# Patient Record
Sex: MALE | Race: WHITE | Employment: OTHER | URBAN - METROPOLITAN AREA
[De-identification: names, ages, dates, MRNs, and addresses within clinical notes are randomized per-mention and may not be internally consistent; named-entity substitution may affect disease eponyms.]

---

## 2017-03-30 RX ORDER — OMEPRAZOLE 40 MG/1
40 CAPSULE, DELAYED RELEASE ORAL DAILY
Qty: 90 CAPSULE | Refills: 3 | Status: SHIPPED | OUTPATIENT
Start: 2017-03-30 | End: 2017-10-24

## 2017-04-10 ENCOUNTER — OFFICE VISIT (OUTPATIENT)
Dept: INTERNAL MEDICINE CLINIC | Age: 82
End: 2017-04-10

## 2017-04-10 VITALS
SYSTOLIC BLOOD PRESSURE: 138 MMHG | RESPIRATION RATE: 17 BRPM | BODY MASS INDEX: 23.46 KG/M2 | HEART RATE: 60 BPM | WEIGHT: 177 LBS | DIASTOLIC BLOOD PRESSURE: 80 MMHG | HEIGHT: 73 IN

## 2017-04-10 DIAGNOSIS — L20.84 INTRINSIC ECZEMA: ICD-10-CM

## 2017-04-10 DIAGNOSIS — G30.1 LATE ONSET ALZHEIMER'S DISEASE WITHOUT BEHAVIORAL DISTURBANCE (HCC): Primary | ICD-10-CM

## 2017-04-10 DIAGNOSIS — F02.80 LATE ONSET ALZHEIMER'S DISEASE WITHOUT BEHAVIORAL DISTURBANCE (HCC): Primary | ICD-10-CM

## 2017-04-10 DIAGNOSIS — K20.90 ESOPHAGITIS: ICD-10-CM

## 2017-04-10 PROCEDURE — 4040F PNEUMOC VAC/ADMIN/RCVD: CPT | Performed by: INTERNAL MEDICINE

## 2017-04-10 PROCEDURE — 1123F ACP DISCUSS/DSCN MKR DOCD: CPT | Performed by: INTERNAL MEDICINE

## 2017-04-10 PROCEDURE — 1036F TOBACCO NON-USER: CPT | Performed by: INTERNAL MEDICINE

## 2017-04-10 PROCEDURE — G8427 DOCREV CUR MEDS BY ELIG CLIN: HCPCS | Performed by: INTERNAL MEDICINE

## 2017-04-10 PROCEDURE — G8420 CALC BMI NORM PARAMETERS: HCPCS | Performed by: INTERNAL MEDICINE

## 2017-04-10 PROCEDURE — 99215 OFFICE O/P EST HI 40 MIN: CPT | Performed by: INTERNAL MEDICINE

## 2017-04-10 RX ORDER — TAMSULOSIN HYDROCHLORIDE 0.4 MG/1
0.8 CAPSULE ORAL DAILY
Qty: 60 CAPSULE | Refills: 11 | Status: SHIPPED | OUTPATIENT
Start: 2017-04-10 | End: 2018-06-15 | Stop reason: SDUPTHER

## 2017-04-10 RX ORDER — TRIAMCINOLONE ACETONIDE 0.25 MG/G
CREAM TOPICAL
Qty: 30 G | Refills: 5 | Status: SHIPPED | OUTPATIENT
Start: 2017-04-10 | End: 2018-07-02

## 2017-08-16 ENCOUNTER — TELEPHONE (OUTPATIENT)
Dept: INTERNAL MEDICINE CLINIC | Age: 82
End: 2017-08-16

## 2017-08-16 ENCOUNTER — OFFICE VISIT (OUTPATIENT)
Dept: INTERNAL MEDICINE CLINIC | Age: 82
End: 2017-08-16

## 2017-08-16 VITALS
BODY MASS INDEX: 22.96 KG/M2 | RESPIRATION RATE: 17 BRPM | SYSTOLIC BLOOD PRESSURE: 139 MMHG | TEMPERATURE: 98.7 F | HEART RATE: 77 BPM | WEIGHT: 174 LBS | DIASTOLIC BLOOD PRESSURE: 76 MMHG | OXYGEN SATURATION: 98 %

## 2017-08-16 DIAGNOSIS — R21 RASH: ICD-10-CM

## 2017-08-16 DIAGNOSIS — G30.1 LATE ONSET ALZHEIMER'S DISEASE WITHOUT BEHAVIORAL DISTURBANCE (HCC): ICD-10-CM

## 2017-08-16 DIAGNOSIS — N30.01 ACUTE CYSTITIS WITH HEMATURIA: ICD-10-CM

## 2017-08-16 DIAGNOSIS — K20.90 ESOPHAGITIS: ICD-10-CM

## 2017-08-16 DIAGNOSIS — N40.0 BENIGN PROSTATIC HYPERPLASIA WITHOUT LOWER URINARY TRACT SYMPTOMS, UNSPECIFIED MORPHOLOGY: ICD-10-CM

## 2017-08-16 DIAGNOSIS — R30.0 DYSURIA: ICD-10-CM

## 2017-08-16 DIAGNOSIS — F02.80 LATE ONSET ALZHEIMER'S DISEASE WITHOUT BEHAVIORAL DISTURBANCE (HCC): ICD-10-CM

## 2017-08-16 DIAGNOSIS — I10 ESSENTIAL HYPERTENSION: Primary | ICD-10-CM

## 2017-08-16 LAB
BILIRUBIN, POC: NEGATIVE
BLOOD URINE, POC: ABNORMAL
CLARITY, POC: ABNORMAL
COLOR, POC: ABNORMAL
GLUCOSE URINE, POC: NEGATIVE
KETONES, POC: NEGATIVE
LEUKOCYTE EST, POC: ABNORMAL
NITRITE, POC: NEGATIVE
PH, POC: 5.5
PROTEIN, POC: ABNORMAL
SPECIFIC GRAVITY, POC: 1.02
UROBILINOGEN, POC: 0.2

## 2017-08-16 PROCEDURE — 90670 PCV13 VACCINE IM: CPT | Performed by: INTERNAL MEDICINE

## 2017-08-16 PROCEDURE — G8427 DOCREV CUR MEDS BY ELIG CLIN: HCPCS | Performed by: INTERNAL MEDICINE

## 2017-08-16 PROCEDURE — 81002 URINALYSIS NONAUTO W/O SCOPE: CPT | Performed by: INTERNAL MEDICINE

## 2017-08-16 PROCEDURE — G8420 CALC BMI NORM PARAMETERS: HCPCS | Performed by: INTERNAL MEDICINE

## 2017-08-16 PROCEDURE — 1036F TOBACCO NON-USER: CPT | Performed by: INTERNAL MEDICINE

## 2017-08-16 PROCEDURE — G0009 ADMIN PNEUMOCOCCAL VACCINE: HCPCS | Performed by: INTERNAL MEDICINE

## 2017-08-16 PROCEDURE — 4040F PNEUMOC VAC/ADMIN/RCVD: CPT | Performed by: INTERNAL MEDICINE

## 2017-08-16 PROCEDURE — 1123F ACP DISCUSS/DSCN MKR DOCD: CPT | Performed by: INTERNAL MEDICINE

## 2017-08-16 PROCEDURE — 99215 OFFICE O/P EST HI 40 MIN: CPT | Performed by: INTERNAL MEDICINE

## 2017-08-16 RX ORDER — PERMETHRIN 50 MG/G
CREAM TOPICAL
Qty: 1 TUBE | Refills: 1 | Status: SHIPPED | OUTPATIENT
Start: 2017-08-16 | End: 2018-07-02

## 2017-08-16 RX ORDER — CIPROFLOXACIN 500 MG/1
500 TABLET, FILM COATED ORAL 2 TIMES DAILY
Qty: 10 TABLET | Refills: 0 | Status: SHIPPED | OUTPATIENT
Start: 2017-08-16 | End: 2017-08-21

## 2017-08-16 RX ORDER — LATANOPROST 50 UG/ML
SOLUTION/ DROPS OPHTHALMIC
Refills: 6 | COMMUNITY
Start: 2017-06-12

## 2017-08-16 ASSESSMENT — PATIENT HEALTH QUESTIONNAIRE - PHQ9
2. FEELING DOWN, DEPRESSED OR HOPELESS: 0
SUM OF ALL RESPONSES TO PHQ QUESTIONS 1-9: 0
SUM OF ALL RESPONSES TO PHQ9 QUESTIONS 1 & 2: 0
1. LITTLE INTEREST OR PLEASURE IN DOING THINGS: 0

## 2017-08-19 LAB
ORGANISM: ABNORMAL
URINE CULTURE, ROUTINE: ABNORMAL

## 2017-10-10 ENCOUNTER — TELEPHONE (OUTPATIENT)
Dept: FAMILY MEDICINE CLINIC | Age: 82
End: 2017-10-10

## 2017-10-11 NOTE — TELEPHONE ENCOUNTER
Caryn W/ Romy informed that we do not have a release of records of file for this year so she would have to come up to the office to fill out this release and to bring the documents stating she is Budd Shaheen grider then I would be able to provide her with Erick thao AVS

## 2017-10-24 ENCOUNTER — OFFICE VISIT (OUTPATIENT)
Dept: INTERNAL MEDICINE CLINIC | Age: 82
End: 2017-10-24

## 2017-10-24 VITALS
RESPIRATION RATE: 16 BRPM | DIASTOLIC BLOOD PRESSURE: 79 MMHG | HEART RATE: 65 BPM | SYSTOLIC BLOOD PRESSURE: 138 MMHG | BODY MASS INDEX: 22.53 KG/M2 | WEIGHT: 170 LBS | HEIGHT: 73 IN

## 2017-10-24 DIAGNOSIS — I10 ESSENTIAL HYPERTENSION: ICD-10-CM

## 2017-10-24 DIAGNOSIS — K21.00 GASTROESOPHAGEAL REFLUX DISEASE WITH ESOPHAGITIS: ICD-10-CM

## 2017-10-24 DIAGNOSIS — N39.0 URINARY TRACT INFECTION WITHOUT HEMATURIA, SITE UNSPECIFIED: Primary | ICD-10-CM

## 2017-10-24 LAB
BILIRUBIN, POC: NEGATIVE
BLOOD URINE, POC: NEGATIVE
CLARITY, POC: NORMAL
COLOR, POC: YELLOW
GLUCOSE URINE, POC: NEGATIVE
KETONES, POC: NEGATIVE
LEUKOCYTE EST, POC: NEGATIVE
NITRITE, POC: NEGATIVE
PH, POC: 6
PROTEIN, POC: NEGATIVE
SPECIFIC GRAVITY, POC: 1.02
UROBILINOGEN, POC: 0.2

## 2017-10-24 PROCEDURE — G8484 FLU IMMUNIZE NO ADMIN: HCPCS | Performed by: INTERNAL MEDICINE

## 2017-10-24 PROCEDURE — 99214 OFFICE O/P EST MOD 30 MIN: CPT | Performed by: INTERNAL MEDICINE

## 2017-10-24 PROCEDURE — G8420 CALC BMI NORM PARAMETERS: HCPCS | Performed by: INTERNAL MEDICINE

## 2017-10-24 PROCEDURE — 1036F TOBACCO NON-USER: CPT | Performed by: INTERNAL MEDICINE

## 2017-10-24 PROCEDURE — 81002 URINALYSIS NONAUTO W/O SCOPE: CPT | Performed by: INTERNAL MEDICINE

## 2017-10-24 PROCEDURE — 1123F ACP DISCUSS/DSCN MKR DOCD: CPT | Performed by: INTERNAL MEDICINE

## 2017-10-24 PROCEDURE — 4040F PNEUMOC VAC/ADMIN/RCVD: CPT | Performed by: INTERNAL MEDICINE

## 2017-10-24 PROCEDURE — G8427 DOCREV CUR MEDS BY ELIG CLIN: HCPCS | Performed by: INTERNAL MEDICINE

## 2017-10-24 RX ORDER — RANITIDINE 150 MG/1
150 TABLET ORAL 2 TIMES DAILY
Qty: 180 TABLET | Refills: 3 | Status: SHIPPED | OUTPATIENT
Start: 2017-10-24 | End: 2018-07-02

## 2017-11-14 RX ORDER — OMEPRAZOLE 40 MG/1
40 CAPSULE, DELAYED RELEASE ORAL DAILY
Qty: 90 CAPSULE | Refills: 3 | OUTPATIENT
Start: 2017-11-14

## 2017-11-14 NOTE — TELEPHONE ENCOUNTER
Daughter calling, stating that patient is complaining that the Zantac is not helping his heartburn and he would like to start taking the Omeprazole again.

## 2017-11-16 RX ORDER — OMEPRAZOLE 40 MG/1
40 CAPSULE, DELAYED RELEASE ORAL DAILY
Qty: 90 CAPSULE | Refills: 0 | Status: SHIPPED | OUTPATIENT
Start: 2017-11-16 | End: 2018-04-02 | Stop reason: SDUPTHER

## 2018-04-02 RX ORDER — OMEPRAZOLE 40 MG/1
40 CAPSULE, DELAYED RELEASE ORAL DAILY
Qty: 90 CAPSULE | Refills: 1 | Status: SHIPPED | OUTPATIENT
Start: 2018-04-02 | End: 2018-10-10 | Stop reason: SDUPTHER

## 2018-05-07 ENCOUNTER — TELEPHONE (OUTPATIENT)
Dept: INTERNAL MEDICINE CLINIC | Age: 83
End: 2018-05-07

## 2018-05-07 DIAGNOSIS — R32 URINARY INCONTINENCE, UNSPECIFIED TYPE: Primary | ICD-10-CM

## 2018-05-22 DIAGNOSIS — R32 URINARY INCONTINENCE, UNSPECIFIED TYPE: ICD-10-CM

## 2018-05-25 ENCOUNTER — TELEPHONE (OUTPATIENT)
Dept: INTERNAL MEDICINE CLINIC | Age: 83
End: 2018-05-25

## 2018-05-25 DIAGNOSIS — R32 URINARY INCONTINENCE, UNSPECIFIED TYPE: ICD-10-CM

## 2018-06-11 ENCOUNTER — TELEPHONE (OUTPATIENT)
Dept: INTERNAL MEDICINE CLINIC | Age: 83
End: 2018-06-11

## 2018-06-15 RX ORDER — TAMSULOSIN HYDROCHLORIDE 0.4 MG/1
0.8 CAPSULE ORAL DAILY
Qty: 60 CAPSULE | Refills: 0 | Status: SHIPPED | OUTPATIENT
Start: 2018-06-15 | End: 2018-07-13 | Stop reason: SDUPTHER

## 2018-07-02 ENCOUNTER — OFFICE VISIT (OUTPATIENT)
Dept: INTERNAL MEDICINE CLINIC | Age: 83
End: 2018-07-02

## 2018-07-02 VITALS
RESPIRATION RATE: 16 BRPM | HEIGHT: 73 IN | BODY MASS INDEX: 21.47 KG/M2 | SYSTOLIC BLOOD PRESSURE: 155 MMHG | WEIGHT: 162 LBS | OXYGEN SATURATION: 99 % | DIASTOLIC BLOOD PRESSURE: 84 MMHG | HEART RATE: 84 BPM

## 2018-07-02 DIAGNOSIS — K21.00 GASTROESOPHAGEAL REFLUX DISEASE WITH ESOPHAGITIS: ICD-10-CM

## 2018-07-02 DIAGNOSIS — R35.0 URINARY FREQUENCY: ICD-10-CM

## 2018-07-02 DIAGNOSIS — N40.0 BENIGN PROSTATIC HYPERPLASIA, UNSPECIFIED WHETHER LOWER URINARY TRACT SYMPTOMS PRESENT: ICD-10-CM

## 2018-07-02 DIAGNOSIS — R30.0 DYSURIA: ICD-10-CM

## 2018-07-02 DIAGNOSIS — I10 ESSENTIAL HYPERTENSION: Primary | ICD-10-CM

## 2018-07-02 DIAGNOSIS — F02.80 LATE ONSET ALZHEIMER'S DISEASE WITHOUT BEHAVIORAL DISTURBANCE (HCC): ICD-10-CM

## 2018-07-02 DIAGNOSIS — N30.01 ACUTE CYSTITIS WITH HEMATURIA: ICD-10-CM

## 2018-07-02 DIAGNOSIS — G30.1 LATE ONSET ALZHEIMER'S DISEASE WITHOUT BEHAVIORAL DISTURBANCE (HCC): ICD-10-CM

## 2018-07-02 LAB
BILIRUBIN, POC: ABNORMAL
BLOOD URINE, POC: ABNORMAL
CLARITY, POC: ABNORMAL
COLOR, POC: YELLOW
GLUCOSE URINE, POC: ABNORMAL
KETONES, POC: ABNORMAL
LEUKOCYTE EST, POC: ABNORMAL
NITRITE, POC: ABNORMAL
PH, POC: 5.5
PROTEIN, POC: 100
SPECIFIC GRAVITY, POC: 1.02
UROBILINOGEN, POC: 0.2

## 2018-07-02 PROCEDURE — 81002 URINALYSIS NONAUTO W/O SCOPE: CPT | Performed by: INTERNAL MEDICINE

## 2018-07-02 PROCEDURE — 99214 OFFICE O/P EST MOD 30 MIN: CPT | Performed by: INTERNAL MEDICINE

## 2018-07-02 RX ORDER — CIPROFLOXACIN 500 MG/1
500 TABLET, FILM COATED ORAL 2 TIMES DAILY
Qty: 20 TABLET | Refills: 0 | Status: SHIPPED | OUTPATIENT
Start: 2018-07-02 | End: 2018-07-12

## 2018-07-02 NOTE — PATIENT INSTRUCTIONS
Patient Education        Urinary Tract Infections in Men: Care Instructions  Your Care Instructions    A urinary tract infection, or UTI, is a general term for an infection anywhere between the kidneys and the tip of the penis. UTIs can also be a result of a prostate problem. Most cause pain or burning when you urinate. Most UTIs are caused by bacteria and can be cured with antibiotics. It is important to complete your treatment so that the infection does not get worse. Follow-up care is a key part of your treatment and safety. Be sure to make and go to all appointments, and call your doctor if you are having problems. It's also a good idea to know your test results and keep a list of the medicines you take. How can you care for yourself at home? · Take your antibiotics as prescribed. Do not stop taking them just because you feel better. You need to take the full course of antibiotics. · Take your medicines exactly as prescribed. Your doctor may have prescribed a medicine, such as phenazopyridine (Pyridium), to help relieve pain when you urinate. This turns your urine orange. You may stop taking it when your symptoms get better. But be sure to take all of your antibiotics, which treat the infection. · Drink extra water for the next day or two. This will help make the urine less concentrated and help wash out the bacteria causing the infection. (If you have kidney, heart, or liver disease and have to limit your fluids, talk with your doctor before you increase your fluid intake.)  · Avoid drinks that are carbonated or have caffeine. They can irritate the bladder. · Urinate often. Try to empty your bladder each time. · To relieve pain, take a hot bath or lay a heating pad (set on low) over your lower belly or genital area. Never go to sleep with a heating pad in place. To help prevent UTIs  · Drink plenty of fluids, enough so that your urine is light yellow or clear like water.  If you have kidney, heart, or more?  Go to [email or web address] and sign in to your StayT[employer] account. Enter T255 in the [facility] box to learn more about \"Urinary Tract Infections in Men: Care Instructions. \"     If you do not have an account, please click on the \"Sign Up Now\" link. Current as of: May 12, 2017  Content Version: 11.6  © 7463-5710 [employer]. Care instructions adapted under license by [facility] ([facility]). If you have questions about a medical condition or this instruction, always ask your healthcare professional. [address] any warranty or liability for your use of this information.

## 2018-07-02 NOTE — PROGRESS NOTES
Chief Complaint   Patient presents with    Hypertension       HPI: Here for management of multiple chronic conditions as per the active problems list below, which I reviewed and updated with the patient today. States doing well with no new concerns except if noted below. I have reviewed the chart notes available from myself and other providers. I have addressed all active problems listed below, and created or updated the problems list as needed. Patient Active Problem List   Diagnosis    Benign prostatic hyperplasia    Gastroesophageal reflux disease with esophagitis    Hypertension    Terminal esophageal web    Primary open angle glaucoma of both eyes, mild stage    S/P TURP (status post transurethral resection of prostate)    Intrinsic eczema    Late onset Alzheimer's disease without behavioral disturbance       No problem-specific Assessment & Plan notes found for this encounter.       Discussed all labs, other tests, and imaging if available   Lab Results   Component Value Date    WBC 5.5 04/27/2016    HGB 13.5 04/27/2016    HCT 40.6 04/27/2016    MCV 90.7 04/27/2016     04/27/2016    LYMPHOPCT 30.3 04/27/2016    RBC 4.47 04/27/2016    MCH 30.1 04/27/2016    MCHC 33.2 04/27/2016    RDW 13.7 04/27/2016     Lab Results   Component Value Date     07/18/2016    K 4.4 07/18/2016     07/18/2016    CO2 22 07/18/2016    BUN 18 07/18/2016    CREATININE 1.2 07/18/2016    GLUCOSE 97 07/18/2016    CALCIUM 9.2 07/18/2016    PROT 6.8 07/18/2016    LABALBU 4.1 07/18/2016    BILITOT 0.5 07/18/2016    ALKPHOS 91 07/18/2016    AST 15 07/18/2016    ALT 16 07/18/2016    LABGLOM 58 (A) 07/18/2016    GFRAA >60 07/18/2016    AGRATIO 1.5 07/18/2016    GLOB 2.7 07/18/2016     No results found for: TRIG  No results found for: HDL  No results found for: LDLCALC, LDLCHOLESTEROL  No results found for: PSA, PSADIA  No results found for: TSHFT4, TSH  No results found for: LABA1C  No results found for: EAG    Prior to Admission medications    Medication Sig Start Date End Date Taking?  Authorizing Provider   ciprofloxacin (CIPRO) 500 MG tablet Take 1 tablet by mouth 2 times daily for 10 days 7/2/18 7/12/18 Yes Sean Woods MD   tamsulosin [facility]) 0.4 MG capsule TAKE 2 CAPSULES BY MOUTH DAILY 6/15/18  Yes Sean Woods MD   Incontinence Supply Disposable MISC Adult incontinence brief size XL Use as directed 5/25/18  Yes Sean Woods MD   omeprazole (PRILOSEC) 40 MG delayed release capsule Take 1 capsule by mouth daily 4/2/18  Yes Sean Woods MD   latanoprost (XALATAN) 0.005 % ophthalmic solution INT 1 GTT IN OU HS 6/12/17  Yes Historical Provider, MD   timolol (TIMOPTIC) 0.5 % ophthalmic solution Place 1 drop into both eyes daily   Yes Historical Provider, MD STRONG  CONSTITUTIONAL: [x] All Symptoms Negative  [] Fever     [] Chills     [] Weight Loss  [] Fatigue     [] Sweating     [] Weakness  Comments:     EYE: [x] All Symptoms Negative  [] Blurred Vision     [] Double Vision     [] Light Sensitivity  [] Eye Pain     [] Eye Discharge     [] Eye Redness  Comments:     GASTROINTESTINAL: [x] All Symptoms Negative  [] Heart Burn     [] Nausea     [] Vomiting  [] Abdominal Pain     Diarrhea     [] Constipation  [] Blood in Stool     [] Black Tarry Stool  Comments:     ENDO: [x] All Symptoms Negative  [] Easy Bruising     [] Increased Thirst  Comments:     SKIN: [x] All Symptoms Negative  [] Rash     [] Itching  Comments:     NEUROLOGICAL: [x] All Symptoms Negative  [] Dizziness     [] Tingling     [] Tremor  [] Sensory Change     [] Speech Change     [] Focal Weakness  [] Seizures     [] LOC  Comments:     CARDIOVASCULAR: [x] All Symptoms Negative  [] Chest Pain     [] Palpitations     [] Orthopnea  [] Cramps When Walking     [] Leg Swelling  Comments:     URINARY: [] All Symptoms Negative  [] Pain/Burning     [] Urgency     [x] Frequency  [] Blood in Urine     [] Flank Pain  Comments: reports to be taking, and reviewed action/ side effects and how to take any new medications. Patient/caregiver understands purpose and side effects. A complete  list of medications was provided in their after-visit summary. Return in about 2 weeks (around 7/16/2018) for recheck urine. Time based billing: I spent over 25 minutes with this patient, and as is the nature of primary care and typical for my extended visits, over 50 percent of this visit was spent on counseling and coordination of care.

## 2018-07-05 LAB
ORGANISM: ABNORMAL
URINE CULTURE, ROUTINE: ABNORMAL
URINE CULTURE, ROUTINE: ABNORMAL

## 2018-07-13 RX ORDER — TAMSULOSIN HYDROCHLORIDE 0.4 MG/1
0.8 CAPSULE ORAL DAILY
Qty: 60 CAPSULE | Refills: 0 | Status: SHIPPED | OUTPATIENT
Start: 2018-07-13 | End: 2018-08-07 | Stop reason: SDUPTHER

## 2018-07-16 ENCOUNTER — OFFICE VISIT (OUTPATIENT)
Dept: INTERNAL MEDICINE CLINIC | Age: 83
End: 2018-07-16

## 2018-07-16 VITALS
OXYGEN SATURATION: 97 % | DIASTOLIC BLOOD PRESSURE: 82 MMHG | HEIGHT: 73 IN | HEART RATE: 85 BPM | RESPIRATION RATE: 16 BRPM | SYSTOLIC BLOOD PRESSURE: 142 MMHG | WEIGHT: 176 LBS | BODY MASS INDEX: 23.33 KG/M2

## 2018-07-16 DIAGNOSIS — N30.00 ACUTE CYSTITIS WITHOUT HEMATURIA: Primary | ICD-10-CM

## 2018-07-16 PROCEDURE — 81001 URINALYSIS AUTO W/SCOPE: CPT | Performed by: INTERNAL MEDICINE

## 2018-07-16 PROCEDURE — 99214 OFFICE O/P EST MOD 30 MIN: CPT | Performed by: INTERNAL MEDICINE

## 2018-07-17 LAB
BILIRUBIN URINE: NEGATIVE
BLOOD, URINE: NEGATIVE
CLARITY: CLEAR
COLOR: YELLOW
COMMENT UA: ABNORMAL
EPITHELIAL CELLS, UA: 3 /HPF (ref 0–5)
GLUCOSE URINE: NEGATIVE MG/DL
HYALINE CASTS: 5 /LPF (ref 0–8)
KETONES, URINE: NEGATIVE MG/DL
LEUKOCYTE ESTERASE, URINE: ABNORMAL
MICROSCOPIC EXAMINATION: YES
NITRITE, URINE: NEGATIVE
PH UA: 7
PROTEIN UA: ABNORMAL MG/DL
RBC UA: 2 /HPF (ref 0–4)
SPECIFIC GRAVITY UA: 1.01
UROBILINOGEN, URINE: 0.2 E.U./DL
WBC UA: 33 /HPF (ref 0–5)

## 2018-07-18 LAB — URINE CULTURE, ROUTINE: NORMAL

## 2018-08-07 ENCOUNTER — TELEPHONE (OUTPATIENT)
Dept: INTERNAL MEDICINE CLINIC | Age: 83
End: 2018-08-07

## 2018-08-07 RX ORDER — TAMSULOSIN HYDROCHLORIDE 0.4 MG/1
0.8 CAPSULE ORAL DAILY
Qty: 60 CAPSULE | Refills: 0 | Status: SHIPPED | OUTPATIENT
Start: 2018-08-07 | End: 2018-10-01 | Stop reason: SDUPTHER

## 2018-08-27 RX ORDER — RANITIDINE 150 MG/1
150 TABLET ORAL 2 TIMES DAILY
Qty: 60 TABLET | Refills: 2 | Status: SHIPPED | OUTPATIENT
Start: 2018-08-27 | End: 2018-10-17 | Stop reason: SDUPTHER

## 2018-08-27 RX ORDER — RANITIDINE 150 MG/1
150 TABLET ORAL 2 TIMES DAILY
Refills: 2 | COMMUNITY
Start: 2018-07-17 | End: 2018-08-27 | Stop reason: SDUPTHER

## 2018-08-27 NOTE — TELEPHONE ENCOUNTER
Patient requesting a medication refill.   Medication: zantac  Dosage: 150 mg  Frequency: 1 tab BID  Last filled on: 7/17/18  [facility]  Next office visit: none  Last regular office visit: 7/2/18

## 2018-10-11 RX ORDER — OMEPRAZOLE 40 MG/1
40 CAPSULE, DELAYED RELEASE ORAL DAILY
Qty: 90 CAPSULE | Refills: 1 | Status: SHIPPED | OUTPATIENT
Start: 2018-10-11 | End: 2019-04-21 | Stop reason: SDUPTHER

## 2018-10-18 RX ORDER — RANITIDINE 150 MG/1
TABLET ORAL
Qty: 180 TABLET | Refills: 2 | Status: SHIPPED | OUTPATIENT
Start: 2018-10-18 | End: 2019-08-20 | Stop reason: SDUPTHER

## 2019-02-04 ENCOUNTER — OFFICE VISIT (OUTPATIENT)
Dept: INTERNAL MEDICINE CLINIC | Age: 84
End: 2019-02-04
Payer: MEDICARE

## 2019-02-04 VITALS
OXYGEN SATURATION: 99 % | HEIGHT: 73 IN | DIASTOLIC BLOOD PRESSURE: 78 MMHG | WEIGHT: 172 LBS | HEART RATE: 93 BPM | SYSTOLIC BLOOD PRESSURE: 136 MMHG | BODY MASS INDEX: 22.8 KG/M2

## 2019-02-04 DIAGNOSIS — N40.1 BPH WITH OBSTRUCTION/LOWER URINARY TRACT SYMPTOMS: ICD-10-CM

## 2019-02-04 DIAGNOSIS — R32 URINARY INCONTINENCE, UNSPECIFIED TYPE: ICD-10-CM

## 2019-02-04 DIAGNOSIS — K21.00 GASTROESOPHAGEAL REFLUX DISEASE WITH ESOPHAGITIS: ICD-10-CM

## 2019-02-04 DIAGNOSIS — I10 ESSENTIAL HYPERTENSION: Primary | ICD-10-CM

## 2019-02-04 DIAGNOSIS — N13.8 BPH WITH OBSTRUCTION/LOWER URINARY TRACT SYMPTOMS: ICD-10-CM

## 2019-02-04 DIAGNOSIS — N30.00 ACUTE CYSTITIS WITHOUT HEMATURIA: ICD-10-CM

## 2019-02-04 DIAGNOSIS — K59.04 CHRONIC IDIOPATHIC CONSTIPATION: ICD-10-CM

## 2019-02-04 DIAGNOSIS — G30.1 LATE ONSET ALZHEIMER'S DISEASE WITHOUT BEHAVIORAL DISTURBANCE (HCC): ICD-10-CM

## 2019-02-04 DIAGNOSIS — I10 ESSENTIAL HYPERTENSION: ICD-10-CM

## 2019-02-04 DIAGNOSIS — F02.80 LATE ONSET ALZHEIMER'S DISEASE WITHOUT BEHAVIORAL DISTURBANCE (HCC): ICD-10-CM

## 2019-02-04 DIAGNOSIS — Q39.4 TERMINAL ESOPHAGEAL WEB: ICD-10-CM

## 2019-02-04 LAB
BASOPHILS ABSOLUTE: 0 K/UL (ref 0–0.2)
BASOPHILS RELATIVE PERCENT: 0.5 %
BILIRUBIN, POC: NORMAL
BLOOD URINE, POC: NORMAL
CLARITY, POC: NORMAL
COLOR, POC: NORMAL
EOSINOPHILS ABSOLUTE: 0.2 K/UL (ref 0–0.6)
EOSINOPHILS RELATIVE PERCENT: 2 %
GLUCOSE URINE, POC: NORMAL
HCT VFR BLD CALC: 39.8 % (ref 40.5–52.5)
HEMOGLOBIN: 13.5 G/DL (ref 13.5–17.5)
KETONES, POC: NORMAL
LEUKOCYTE EST, POC: NORMAL
LYMPHOCYTES ABSOLUTE: 1.5 K/UL (ref 1–5.1)
LYMPHOCYTES RELATIVE PERCENT: 18.7 %
MCH RBC QN AUTO: 31.7 PG (ref 26–34)
MCHC RBC AUTO-ENTMCNC: 33.9 G/DL (ref 31–36)
MCV RBC AUTO: 93.5 FL (ref 80–100)
MONOCYTES ABSOLUTE: 0.7 K/UL (ref 0–1.3)
MONOCYTES RELATIVE PERCENT: 8.6 %
NEUTROPHILS ABSOLUTE: 5.5 K/UL (ref 1.7–7.7)
NEUTROPHILS RELATIVE PERCENT: 70.2 %
NITRITE, POC: NORMAL
PDW BLD-RTO: 13.2 % (ref 12.4–15.4)
PH, POC: 6
PLATELET # BLD: 257 K/UL (ref 135–450)
PMV BLD AUTO: 12.1 FL (ref 5–10.5)
PROTEIN, POC: NORMAL
RBC # BLD: 4.26 M/UL (ref 4.2–5.9)
SPECIFIC GRAVITY, POC: 1.02
UROBILINOGEN, POC: 0.2
WBC # BLD: 7.9 K/UL (ref 4–11)

## 2019-02-04 PROCEDURE — 99215 OFFICE O/P EST HI 40 MIN: CPT | Performed by: INTERNAL MEDICINE

## 2019-02-04 PROCEDURE — 81002 URINALYSIS NONAUTO W/O SCOPE: CPT | Performed by: INTERNAL MEDICINE

## 2019-02-04 RX ORDER — CIPROFLOXACIN 500 MG/1
500 TABLET, FILM COATED ORAL 2 TIMES DAILY
Qty: 20 TABLET | Refills: 0 | Status: SHIPPED | OUTPATIENT
Start: 2019-02-04 | End: 2019-02-14

## 2019-02-04 RX ORDER — SENNA PLUS 8.6 MG/1
1 TABLET ORAL 2 TIMES DAILY
Qty: 60 TABLET | Refills: 11 | Status: SHIPPED | OUTPATIENT
Start: 2019-02-04 | End: 2019-06-03

## 2019-02-04 RX ORDER — FINASTERIDE 5 MG/1
5 TABLET, FILM COATED ORAL DAILY
Qty: 30 TABLET | Refills: 3 | Status: SHIPPED | OUTPATIENT
Start: 2019-02-04 | End: 2019-05-25 | Stop reason: SDUPTHER

## 2019-02-05 LAB
A/G RATIO: 1.3 (ref 1.1–2.2)
ALBUMIN SERPL-MCNC: 4 G/DL (ref 3.4–5)
ALP BLD-CCNC: 90 U/L (ref 40–129)
ALT SERPL-CCNC: 13 U/L (ref 10–40)
ANION GAP SERPL CALCULATED.3IONS-SCNC: 14 MMOL/L (ref 3–16)
AST SERPL-CCNC: 11 U/L (ref 15–37)
BILIRUB SERPL-MCNC: 0.6 MG/DL (ref 0–1)
BUN BLDV-MCNC: 15 MG/DL (ref 7–20)
CALCIUM SERPL-MCNC: 9.7 MG/DL (ref 8.3–10.6)
CHLORIDE BLD-SCNC: 103 MMOL/L (ref 99–110)
CO2: 25 MMOL/L (ref 21–32)
CREAT SERPL-MCNC: 1.1 MG/DL (ref 0.8–1.3)
GFR AFRICAN AMERICAN: >60
GFR NON-AFRICAN AMERICAN: >60
GLOBULIN: 3.1 G/DL
GLUCOSE BLD-MCNC: 101 MG/DL (ref 70–99)
POTASSIUM SERPL-SCNC: 4.6 MMOL/L (ref 3.5–5.1)
SODIUM BLD-SCNC: 142 MMOL/L (ref 136–145)
TOTAL PROTEIN: 7.1 G/DL (ref 6.4–8.2)
TSH REFLEX FT4: 1.45 UIU/ML (ref 0.27–4.2)

## 2019-02-06 LAB — URINE CULTURE, ROUTINE: NORMAL

## 2019-02-20 ENCOUNTER — TELEPHONE (OUTPATIENT)
Dept: INTERNAL MEDICINE CLINIC | Age: 84
End: 2019-02-20

## 2019-02-20 DIAGNOSIS — R54 FRAILTY SYNDROME IN GERIATRIC PATIENT: ICD-10-CM

## 2019-02-21 PROBLEM — R54 FRAILTY SYNDROME IN GERIATRIC PATIENT: Status: ACTIVE | Noted: 2019-02-21

## 2019-04-22 NOTE — TELEPHONE ENCOUNTER
Patient requesting a medication refill.   Medication omeprazole  Dosage 40mg  Frequencydaily  Last [facility] 10/11/18  [facility] [place] [place]  Next office visit8/5/19  Last regular office visit2/4/19

## 2019-04-23 RX ORDER — OMEPRAZOLE 40 MG/1
40 CAPSULE, DELAYED RELEASE ORAL DAILY
Qty: 90 CAPSULE | Refills: 1 | Status: SHIPPED | OUTPATIENT
Start: 2019-04-23 | End: 2019-10-17 | Stop reason: SDUPTHER

## 2019-05-25 DIAGNOSIS — N40.1 BPH WITH OBSTRUCTION/LOWER URINARY TRACT SYMPTOMS: ICD-10-CM

## 2019-05-25 DIAGNOSIS — N13.8 BPH WITH OBSTRUCTION/LOWER URINARY TRACT SYMPTOMS: ICD-10-CM

## 2019-05-28 RX ORDER — FINASTERIDE 5 MG/1
TABLET, FILM COATED ORAL
Qty: 30 TABLET | Refills: 3 | Status: SHIPPED | OUTPATIENT
Start: 2019-05-28 | End: 2019-08-22 | Stop reason: SDUPTHER

## 2019-05-28 NOTE — TELEPHONE ENCOUNTER
Requested Prescriptions     Pending Prescriptions Disp Refills    finasteride (PROSCAR) 5 MG tablet [Pharmacy Med Name: FINASTERIDE 5 MG TABLET] 30 tablet 3     Sig: TAKE 1 TABLET BY MOUTH EVERY DAY   last filled 2/4/19  Last OV 2/4/19  Next OV 6/3/19

## 2019-06-03 ENCOUNTER — OFFICE VISIT (OUTPATIENT)
Dept: INTERNAL MEDICINE CLINIC | Age: 84
End: 2019-06-03
Payer: MEDICARE

## 2019-06-03 VITALS
DIASTOLIC BLOOD PRESSURE: 72 MMHG | HEART RATE: 80 BPM | OXYGEN SATURATION: 96 % | SYSTOLIC BLOOD PRESSURE: 148 MMHG | WEIGHT: 172 LBS | BODY MASS INDEX: 22.69 KG/M2

## 2019-06-03 DIAGNOSIS — F79 MENTAL DISABILITY: ICD-10-CM

## 2019-06-03 DIAGNOSIS — N39.0 FREQUENT UTI: Primary | ICD-10-CM

## 2019-06-03 DIAGNOSIS — F02.80 LATE ONSET ALZHEIMER'S DISEASE WITHOUT BEHAVIORAL DISTURBANCE (HCC): ICD-10-CM

## 2019-06-03 DIAGNOSIS — G30.1 LATE ONSET ALZHEIMER'S DISEASE WITHOUT BEHAVIORAL DISTURBANCE (HCC): ICD-10-CM

## 2019-06-03 LAB
BILIRUBIN, POC: ABNORMAL
BLOOD URINE, POC: ABNORMAL
CLARITY, POC: ABNORMAL
COLOR, POC: YELLOW
GLUCOSE URINE, POC: ABNORMAL
KETONES, POC: ABNORMAL
LEUKOCYTE EST, POC: ABNORMAL
NITRITE, POC: ABNORMAL
PH, POC: 6
PROTEIN, POC: 30
SPECIFIC GRAVITY, POC: 1.02
UROBILINOGEN, POC: 0.2

## 2019-06-03 PROCEDURE — 1123F ACP DISCUSS/DSCN MKR DOCD: CPT | Performed by: INTERNAL MEDICINE

## 2019-06-03 PROCEDURE — 81002 URINALYSIS NONAUTO W/O SCOPE: CPT | Performed by: INTERNAL MEDICINE

## 2019-06-03 PROCEDURE — G8420 CALC BMI NORM PARAMETERS: HCPCS | Performed by: INTERNAL MEDICINE

## 2019-06-03 PROCEDURE — 4040F PNEUMOC VAC/ADMIN/RCVD: CPT | Performed by: INTERNAL MEDICINE

## 2019-06-03 PROCEDURE — G8427 DOCREV CUR MEDS BY ELIG CLIN: HCPCS | Performed by: INTERNAL MEDICINE

## 2019-06-03 PROCEDURE — 1036F TOBACCO NON-USER: CPT | Performed by: INTERNAL MEDICINE

## 2019-06-03 PROCEDURE — 99215 OFFICE O/P EST HI 40 MIN: CPT | Performed by: INTERNAL MEDICINE

## 2019-06-03 RX ORDER — SULFAMETHOXAZOLE AND TRIMETHOPRIM 800; 160 MG/1; MG/1
1 TABLET ORAL 2 TIMES DAILY
Qty: 20 TABLET | Refills: 0 | Status: SHIPPED | OUTPATIENT
Start: 2019-06-03 | End: 2019-06-13

## 2019-06-03 RX ORDER — POLYETHYLENE GLYCOL 3350 17 G/17G
17 POWDER, FOR SOLUTION ORAL DAILY
Qty: 1530 G | Refills: 11 | Status: SHIPPED | OUTPATIENT
Start: 2019-06-03 | End: 2020-05-04

## 2019-06-03 RX ORDER — DORZOLAMIDE HYDROCHLORIDE AND TIMOLOL MALEATE 20; 5 MG/ML; MG/ML
SOLUTION/ DROPS OPHTHALMIC
Refills: 6 | COMMUNITY
Start: 2019-05-15

## 2019-06-03 ASSESSMENT — PATIENT HEALTH QUESTIONNAIRE - PHQ9
SUM OF ALL RESPONSES TO PHQ QUESTIONS 1-9: 0
SUM OF ALL RESPONSES TO PHQ9 QUESTIONS 1 & 2: 0
2. FEELING DOWN, DEPRESSED OR HOPELESS: 0
1. LITTLE INTEREST OR PLEASURE IN DOING THINGS: 0
SUM OF ALL RESPONSES TO PHQ QUESTIONS 1-9: 0

## 2019-06-03 NOTE — PROGRESS NOTES
bac     Chief Complaint   Patient presents with    Other     paperwwork        HPI: Here with daughter/POA guardian. Needs guardianship paperwork completed today/ reuired annually. Continues to reside at the [facility], doing well there and he likes it. C/o urinary irritation past few days, but no fever or flank pain    I have reviewed the chart notes available from myself and other providers. I have addressed all activeproblems listed below, and created or updated the problems list as needed. I have reviewed and updated the problems list in detail with patient. Patient Active Problem List   Diagnosis    BPH with obstruction/lower urinary tract symptoms    Gastroesophageal reflux disease with esophagitis    Hypertension    Terminal esophageal web    Primary open angle glaucoma of both eyes, mild stage    S/P TURP (status post transurethral resection of prostate)    Intrinsic eczema    Late onset Alzheimer's disease without behavioral disturbance    Urinary incontinence    Chronic idiopathic constipation    Frailty syndrome in geriatric patient       No problem-specific Assessment & Plan notes found for this encounter.       Discussed all labs, other tests, and imaging if available   Lab Results   Component Value Date    WBC 7.9 02/04/2019    HGB 13.5 02/04/2019    HCT 39.8 (L) 02/04/2019    MCV 93.5 02/04/2019     02/04/2019    LYMPHOPCT 18.7 02/04/2019    RBC 4.26 02/04/2019    MCH 31.7 02/04/2019    MCHC 33.9 02/04/2019    RDW 13.2 02/04/2019     Lab Results   Component Value Date     02/04/2019    K 4.6 02/04/2019     02/04/2019    CO2 25 02/04/2019    BUN 15 02/04/2019    CREATININE 1.1 02/04/2019    GLUCOSE 101 (H) 02/04/2019    CALCIUM 9.7 02/04/2019    PROT 7.1 02/04/2019    LABALBU 4.0 02/04/2019    BILITOT 0.6 02/04/2019    ALKPHOS 90 02/04/2019    AST 11 (L) 02/04/2019    ALT 13 02/04/2019    LABGLOM >60 02/04/2019    GFRAA >60 02/04/2019    AGRATIO 1.3 02/04/2019 Dementia     Esophagitis     Glaucoma     MRSA infection 1/7/16    urine    Neurocognitive disorder        Past Surgical History:   Procedure Laterality Date    HERNIA REPAIR Left     inguinal    KNEE CARTILAGE SURGERY Right        Social History     Socioeconomic History    Marital status: Single     Spouse name: Not on file    Number of children: 2    Years of education: Not on file    Highest education level: Not on file   Occupational History    Not on file   Social Needs    Financial resource strain: Not on file    Food insecurity:     Worry: Not on file     Inability: Not on file    Transportation needs:     Medical: Not on file     Non-medical: Not on file   Tobacco Use    Smoking status: Never Smoker    Smokeless tobacco: Never Used   Substance and Sexual Activity    Alcohol use: No    Drug use: No    Sexual activity: Not on file   Lifestyle    Physical activity:     Days per week: Not on file     Minutes per session: Not on file    Stress: Not on file   Relationships    Social connections:     Talks on phone: Not on file     Gets together: Not on file     Attends Yarsanism service: Not on file     Active member of club or organization: Not on file     Attends meetings of clubs or organizations: Not on file     Relationship status: Not on file    Intimate partner violence:     Fear of current or ex partner: Not on file     Emotionally abused: Not on file     Physically abused: Not on file     Forced sexual activity: Not on file   Other Topics Concern    Not on file   Social History Narrative    Not on file       Family History   Problem Relation Age of Onset    Cancer Father          Health Maintenance Due   Topic Date Due    Shingles Vaccine (2 of 3) 08/11/2014         BP (!) 148/72 (Site: Left Upper Arm, Position: Sitting)   Pulse 80   Wt 172 lb (78 kg)   SpO2 96%   BMI 22.69 kg/m²   Body mass index is 22.69 kg/m².   Physical Exam alert, well spoken, oriented x 4 but aware that his judgment puts him at risk for being taken advantage of  GENERAL: alert, oriented x4, well-appearing in NAD      Vitals reviewed from intake BP (!) 148/72 (Site: Left Upper Arm, Position: Sitting)   Pulse 80   Wt 172 lb (78 kg)   SpO2 96%   BMI 22.69 kg/m²     HEENT: normocephalic atraumatic clear conj/nares/op     NECK: supple without lymphadenopathy or thyromegaly, no bruit    COR: RRR no murmurs rubs or gallops    LUNGS: clear to auscultation with normal work of breathing    ABDOMEN: soft, nontender, normal bowel sounds, no masses or organomegaly noted    EXTREMITIES: warm, dry, well-perfused, no edema    DERM: no suspicious lesions, no rashes    NEURO: cranial nerves intact, normal speech and gait    SPINE: straight, supple, nontender without swelling. No CVAT    ASSESSMENT AND PLANS:      Except as noted below, all chronic problems have been reviewed and are stable to continue medications or other therapy as previously documented in the patient's chart, with changes per orders or documentation below:        Assessment and Plan: Patient received counseling and, if relevant,printed instructions for all symptoms listed in CC and HPI, as well as for all diagnoses brought onto today's visit note below. Typical counseling includes, but is not limited to, non pharmacologic measures to manage listed symptoms and conditions; appropriate use, risks and benefits for all prescribed medications; potential interactions between medications both prescribed and OTC; diet; exercise; healthy behaviors; and goalsetting to improve health. Pt.or responsible party was involved in shared decision making and had opportunity to have all questions answered. 1. Frequent UTI--with acute current infection.    - POCT Urinalysis no Micro  - URINE CULTURE    2. Mental disability--guardianship forms completed and scanned, faxed, and returned to guardian    3.  Late onset Alzheimer's disease without behavioral disturbance

## 2019-06-05 LAB
ORGANISM: ABNORMAL
URINE CULTURE, ROUTINE: ABNORMAL
URINE CULTURE, ROUTINE: ABNORMAL

## 2019-06-06 ENCOUNTER — TELEPHONE (OUTPATIENT)
Dept: INTERNAL MEDICINE CLINIC | Age: 84
End: 2019-06-06

## 2019-06-24 ENCOUNTER — OFFICE VISIT (OUTPATIENT)
Dept: INTERNAL MEDICINE CLINIC | Age: 84
End: 2019-06-24
Payer: MEDICARE

## 2019-06-24 VITALS
HEIGHT: 73 IN | TEMPERATURE: 98.1 F | BODY MASS INDEX: 22.5 KG/M2 | SYSTOLIC BLOOD PRESSURE: 140 MMHG | HEART RATE: 77 BPM | DIASTOLIC BLOOD PRESSURE: 76 MMHG | WEIGHT: 169.8 LBS | OXYGEN SATURATION: 98 %

## 2019-06-24 DIAGNOSIS — M54.50 CHRONIC MIDLINE LOW BACK PAIN WITHOUT SCIATICA: ICD-10-CM

## 2019-06-24 DIAGNOSIS — N30.00 ACUTE CYSTITIS WITHOUT HEMATURIA: Primary | ICD-10-CM

## 2019-06-24 DIAGNOSIS — K59.04 CHRONIC IDIOPATHIC CONSTIPATION: ICD-10-CM

## 2019-06-24 DIAGNOSIS — G89.29 CHRONIC MIDLINE LOW BACK PAIN WITHOUT SCIATICA: ICD-10-CM

## 2019-06-24 DIAGNOSIS — N13.8 BPH WITH OBSTRUCTION/LOWER URINARY TRACT SYMPTOMS: ICD-10-CM

## 2019-06-24 DIAGNOSIS — N40.1 BPH WITH OBSTRUCTION/LOWER URINARY TRACT SYMPTOMS: ICD-10-CM

## 2019-06-24 LAB
BILIRUBIN, POC: NEGATIVE
BLOOD URINE, POC: NEGATIVE
CLARITY, POC: NORMAL
COLOR, POC: NORMAL
GLUCOSE URINE, POC: NEGATIVE
KETONES, POC: NORMAL
LEUKOCYTE EST, POC: NORMAL
NITRITE, POC: NEGATIVE
PH, POC: 6
PROTEIN, POC: NORMAL
SPECIFIC GRAVITY, POC: 1.02
UROBILINOGEN, POC: NORMAL

## 2019-06-24 PROCEDURE — 1123F ACP DISCUSS/DSCN MKR DOCD: CPT | Performed by: INTERNAL MEDICINE

## 2019-06-24 PROCEDURE — 1036F TOBACCO NON-USER: CPT | Performed by: INTERNAL MEDICINE

## 2019-06-24 PROCEDURE — G8427 DOCREV CUR MEDS BY ELIG CLIN: HCPCS | Performed by: INTERNAL MEDICINE

## 2019-06-24 PROCEDURE — 4040F PNEUMOC VAC/ADMIN/RCVD: CPT | Performed by: INTERNAL MEDICINE

## 2019-06-24 PROCEDURE — 81002 URINALYSIS NONAUTO W/O SCOPE: CPT | Performed by: INTERNAL MEDICINE

## 2019-06-24 PROCEDURE — G8420 CALC BMI NORM PARAMETERS: HCPCS | Performed by: INTERNAL MEDICINE

## 2019-06-24 PROCEDURE — 99213 OFFICE O/P EST LOW 20 MIN: CPT | Performed by: INTERNAL MEDICINE

## 2019-06-24 RX ORDER — BISACODYL 5 MG
5 TABLET, DELAYED RELEASE (ENTERIC COATED) ORAL DAILY PRN
Qty: 30 TABLET | Refills: 2 | Status: SHIPPED | OUTPATIENT
Start: 2019-06-24 | End: 2020-10-27

## 2019-06-24 NOTE — PROGRESS NOTES
Chief Complaint   Patient presents with    Urinary Tract Infection     Follow up UTI       HPI: Here with daughter to followup UTI. Improved symptomatically. Still has issues with incontinence. ROS (1+): no fevers or flank pain, but lower back pain persisting. Doesn't take medications for it. Having trouble with constipation--pt reports that he is taking miralax daily, but daughter states he has been refusing it. Medications reviewed and reconciled with what patient reports to be taking. BP (!) 138/59 (Site: Left Upper Arm, Position: Sitting, Cuff Size: Medium Adult)   Pulse 76   Temp 98.1 °F (36.7 °C) (Oral)   Ht 6' 1\" (1.854 m)   Wt 169 lb 12.8 oz (77 kg)   SpO2 99%   BMI 22.40 kg/m²     Physical Exam GENERAL: alert, oriented x4, well-appearing in NAD      Vitals reviewed from intake BP (!) 140/76 (Site: Left Upper Arm, Position: Sitting, Cuff Size: Medium Adult)   Pulse 77   Temp 98.1 °F (36.7 °C) (Oral)   Ht 6' 1\" (1.854 m)   Wt 169 lb 12.8 oz (77 kg)   SpO2 98%   BMI 22.40 kg/m²     HEENT: normocephalic atraumatic clear conj/nares/op     NECK: supple without lymphadenopathy or thyromegaly, no bruit    COR: RRR no murmurs rubs or gallops    LUNGS: clear to auscultation with normal work of breathing    ABDOMEN: soft, nontender, normal bowel sounds, no masses or organomegaly noted    EXTREMITIES: warm, dry, well-perfused, no edema    DERM: no suspicious lesions, no rashes    NEURO: cranial nerves intact, normal speech and gait    SPINE: straight, supple, nontender without swelling, indicates wide area of lumbar area as where it hurts      ASSESSMENT/PLAN: Pt received counseling and, if relevant, printed instructions for all symptoms listed in CC and HPI, as well as for all diagnoses listed below. 1. Acute cystitis without hematuria--improved after antibiotics, checking to ensure clearing  - POCT Urinalysis no Micro    2.  Chronic idiopathic constipation--increase miralax to 2 packets per day, add dulcolax if no bm x 3 days    3. BPH with obstruction/lower urinary tract symptoms    4. chornic low back pain--gave exercises, pt does not want to take meds, can use ice or topicals prn      Problem List Items Addressed This Visit     BPH with obstruction/lower urinary tract symptoms    Chronic idiopathic constipation    Chronic midline low back pain without sciatica      Other Visit Diagnoses     Acute cystitis without hematuria    -  Primary    Relevant Orders    POCT Urinalysis no Micro            No follow-ups on file.

## 2019-06-24 NOTE — PATIENT INSTRUCTIONS
Patient Education        Learning About How to Have a Healthy Back  What causes back pain? Back pain is often caused by overuse, strain, or injury. For example, people often hurt their backs playing sports or working in the yard, being jolted in a car accident, or lifting something too heavy. Aging plays a part too. Your bones and muscles tend to lose strength as you age, which makes injury more likely. The spongy discs between the bones of the spine (vertebrae) may suffer from wear and tear and no longer provide enough cushion between the bones. A disc that bulges or breaks open (herniated disc) can press on nerves, causing back pain. In some people, back pain is the result of arthritis, broken vertebrae caused by bone loss (osteoporosis), illness, or a spine problem. Although most people have back pain at one time or another, there are steps you can take to make it less likely. How can you have a healthy back? Reduce stress on your back through good posture  Slumping or slouching alone may not cause low back pain. But after the back has been strained or injured, bad posture can make pain worse. · Sleep in a position that maintains your back's normal curves and on a mattress that feels comfortable. Sleep on your side with a pillow between your knees, or sleep on your back with a pillow under your knees. These positions can reduce strain on your back. · Stand and sit up straight. \"Good posture\" generally means your ears, shoulders, and hips are in a straight line. · If you must stand for a long time, put one foot on a stool, ledge, or box. Switch feet every now and then. · Sit in a chair that is low enough to let you place both feet flat on the floor with both knees nearly level with your hips. If your chair or desk is too high, use a footrest to raise your knees. Place a small pillow, a rolled-up towel, or a lumbar roll in the curve of your back if you need extra support.   · Try a kneeling chair, which helps tilt your hips forward. This takes pressure off your lower back. · Try sitting on an exercise ball. It can rock from side to side, which helps keep your back loose. · When driving, keep your knees nearly level with your hips. Sit straight, and drive with both hands on the steering wheel. Your arms should be in a slightly bent position. Reduce stress on your back through careful lifting  · Squat down, bending at the hips and knees only. If you need to, put one knee to the floor and extend your other knee in front of you, bent at a right angle (half kneeling). · Press your chest straight forward. This helps keep your upper back straight while keeping a slight arch in your low back. · Hold the load as close to your body as possible, at the level of your belly button (navel). · Use your feet to change direction, taking small steps. · Lead with your hips as you change direction. Keep your shoulders in line with your hips as you move. · Set down your load carefully, squatting with your knees and hips only. Exercise and stretch your back  · Do some exercise on most days of the week, if your doctor says it is okay. You can walk, run, swim, or cycle. · Stretch your back muscles. Here are a few exercises to try:  ? Lie on your back, and gently pull one bent knee to your chest. Put that foot back on the floor, and then pull the other knee to your chest.  ? Do pelvic tilts. Lie on your back with your knees bent. Tighten your stomach muscles. Pull your belly button (navel) in and up toward your ribs. You should feel like your back is pressing to the floor and your hips and pelvis are slightly lifting off the floor. Hold for 6 seconds while breathing smoothly. ? Sit with your back flat against a wall. · Keep your core muscles strong. The muscles of your back, belly (abdomen), and buttocks support your spine. ? Pull in your belly and imagine pulling your navel toward your spine.  Hold this for 6 seconds, back. You may hurt your back in an accident or when you exercise or lift something. Most back pain will get better with rest and time. You can take care of yourself at home to help your back heal.  What can you do first to relieve back pain? When you first feel back pain, try these steps:  · Walk. Take a short walk (10 to 20 minutes) on a level surface (no slopes, hills, or stairs) every 2 to 3 hours. Walk only distances you can manage without pain, especially leg pain. · Relax. Find a comfortable position for rest. Some people are comfortable on the floor or a medium-firm bed with a small pillow under their head and another under their knees. Some people prefer to lie on their side with a pillow between their knees. Don't stay in one position for too long. · Try heat or ice. Try using a heating pad on a low or medium setting, or take a warm shower, for 15 to 20 minutes every 2 to 3 hours. Or you can buy single-use heat wraps that last up to 8 hours. You can also try an ice pack for 10 to 15 minutes every 2 to 3 hours. You can use an ice pack or a bag of frozen vegetables wrapped in a thin towel. There is not strong evidence that either heat or ice will help, but you can try them to see if they help. You may also want to try switching between heat and cold. · Take pain medicine exactly as directed. ? If the doctor gave you a prescription medicine for pain, take it as prescribed. ? If you are not taking a prescription pain medicine, ask your doctor if you can take an over-the-counter medicine. What else can you do? · Stretch and exercise. Exercises that increase flexibility may relieve your pain and make it easier for your muscles to keep your spine in a good, neutral position. And don't forget to keep walking. · Do self-massage. You can use self-massage to unwind after work or school or to energize yourself in the morning. You can easily massage your feet, hands, or neck.  Self-massage works best if you are in comfortable clothes and are sitting or lying in a comfortable position. Use oil or lotion to massage bare skin. · Reduce stress. Back pain can lead to a vicious [place]: Distress about the pain tenses the muscles in your back, which in turn causes more pain. Learn how to relax your mind and your muscles to lower your stress. Where can you learn more? Go to https://chpeshoshanaewfelipe.[employer]. org and sign in to your [employer] account. Enter Z153 in the [facility] box to learn more about \"Learning About Relief for Back Pain. \"     If you do not have an account, please click on the \"Sign Up Now\" link. Current as of: September 20, 2018  Content Version: 12.0  © 1027-6401 [employer]. Care instructions adapted under license by [facility] ([facility]). If you have questions about a medical condition or this instruction, always ask your healthcare professional. [address] any warranty or liability for your use of this information.

## 2019-07-03 ENCOUNTER — TELEPHONE (OUTPATIENT)
Dept: INTERNAL MEDICINE CLINIC | Age: 84
End: 2019-07-03

## 2019-08-05 ENCOUNTER — OFFICE VISIT (OUTPATIENT)
Dept: INTERNAL MEDICINE CLINIC | Age: 84
End: 2019-08-05
Payer: MEDICARE

## 2019-08-05 DIAGNOSIS — R54 FRAILTY SYNDROME IN GERIATRIC PATIENT: ICD-10-CM

## 2019-08-05 DIAGNOSIS — N40.1 BPH WITH OBSTRUCTION/LOWER URINARY TRACT SYMPTOMS: ICD-10-CM

## 2019-08-05 DIAGNOSIS — I49.9 CARDIAC ARRHYTHMIA, UNSPECIFIED CARDIAC ARRHYTHMIA TYPE: ICD-10-CM

## 2019-08-05 DIAGNOSIS — N13.8 BPH WITH OBSTRUCTION/LOWER URINARY TRACT SYMPTOMS: ICD-10-CM

## 2019-08-05 DIAGNOSIS — I10 ESSENTIAL HYPERTENSION: Primary | ICD-10-CM

## 2019-08-05 DIAGNOSIS — F02.80 LATE ONSET ALZHEIMER'S DISEASE WITHOUT BEHAVIORAL DISTURBANCE (HCC): ICD-10-CM

## 2019-08-05 DIAGNOSIS — G30.1 LATE ONSET ALZHEIMER'S DISEASE WITHOUT BEHAVIORAL DISTURBANCE (HCC): ICD-10-CM

## 2019-08-05 PROCEDURE — 1036F TOBACCO NON-USER: CPT | Performed by: INTERNAL MEDICINE

## 2019-08-05 PROCEDURE — 4040F PNEUMOC VAC/ADMIN/RCVD: CPT | Performed by: INTERNAL MEDICINE

## 2019-08-05 PROCEDURE — 1123F ACP DISCUSS/DSCN MKR DOCD: CPT | Performed by: INTERNAL MEDICINE

## 2019-08-05 PROCEDURE — G8427 DOCREV CUR MEDS BY ELIG CLIN: HCPCS | Performed by: INTERNAL MEDICINE

## 2019-08-05 PROCEDURE — 93000 ELECTROCARDIOGRAM COMPLETE: CPT | Performed by: INTERNAL MEDICINE

## 2019-08-05 PROCEDURE — 99214 OFFICE O/P EST MOD 30 MIN: CPT | Performed by: INTERNAL MEDICINE

## 2019-08-05 PROCEDURE — G8420 CALC BMI NORM PARAMETERS: HCPCS | Performed by: INTERNAL MEDICINE

## 2019-08-05 NOTE — PROGRESS NOTES
ALT 13 02/04/2019    LABGLOM >60 02/04/2019    GFRAA >60 02/04/2019    AGRATIO 1.3 02/04/2019    GLOB 3.1 02/04/2019     No results found for: TRIG  No results found for: HDL  No results found for: LDLCALC, LDLCHOLESTEROL  No results found for: PSA, PSADIA  Lab Results   Component Value Date    TSHFT4 1.45 02/04/2019     No results found for: LABA1C  No results found for: EAG    I have extensively reviewed and reconciled the medication list, discontinued medications not taking or no longer appropriate, and updated the active meds list    Prior to Visit Medications    Medication Sig Taking? Authorizing Provider   BISACODYL 5 MG EC tablet Take 1 tablet by mouth daily as needed for Constipation Take if no bowel movement by 3rd day.  Yes Evon Abdul MD   dorzolamide-timolol (COSOPT) 22.3-6.8 MG/ML ophthalmic solution INSTILL 1 DROP BY OPHTHALMIC ROUTE EVERY 12 HOURS INTO BOTH EYES Yes Historical Provider, MD   omeprazole (PRILOSEC) 40 MG delayed release capsule TAKE 1 CAPSULE BY MOUTH DAILY Yes ENEDINA Lozoya - CNP   tamsulosin (FLOMAX) 0.4 MG capsule Take 2 capsules by mouth daily Yes Evon Abdul MD   Incontinence Supply Disposable MISC Adult incontinence brief size XL Use as directed Yes Evon bAdul MD   timolol (TIMOPTIC) 0.5 % ophthalmic solution Place 1 drop into both eyes daily Yes Historical Provider, MD   finasteride (PROSCAR) 5 MG tablet TAKE 1 TABLET BY MOUTH EVERY DAY  Evon Abdul MD   ranitidine (ZANTAC) 150 MG tablet TAKE 1 TABLET BY MOUTH TWICE A DAY  Evon Abdul MD   latanoprost (XALATAN) 0.005 % ophthalmic solution INT 1 GTT IN OU HS  Historical Provider, MD         ROS: 12 systems reviewed, as documented by MA    Past Medical History:   Diagnosis Date    Acid reflux     Anxiety     Arthritis     BPH (benign prostatic hyperplasia)     Constipation     Dementia     Esophagitis     Glaucoma     MRSA infection 1/7/16    urine    Neurocognitive disorder

## 2019-08-05 NOTE — PATIENT INSTRUCTIONS
· Talk to your doctor about any limits to activities, such as driving, or tasks where you use power tools or ladders. Activity    · Start light exercise if your doctor says you can. Even a small amount will help you get stronger, have more energy, and manage your stress.     · Get regular exercise. Try for 30 minutes on most days of the week. Ask your doctor what level of exercise is safe for you. If activity is not likely to cause health problems, you probably do not have limits on the type or level of activity that you can do. You may want to walk, swim, bike, or do other activities.     · When you exercise, watch for signs that your heart is working too hard. You are pushing too hard if you cannot talk while you exercise. If you become short of breath or dizzy or have chest pain, sit down and rest.     · Check your pulse daily. Place two fingers on the artery at the palm side of your wrist, in line with your thumb. If your heartbeat seems uneven, talk to your doctor. When should you call for help? Call 911 anytime you think you may need emergency care. For example, call if:    · You have symptoms of sudden heart failure. These may include:  ? Severe trouble breathing. ? A fast or irregular heartbeat. ? Coughing up pink, foamy mucus. ? You passed out.     · You have signs of a stroke. These include:  ? Sudden numbness, paralysis, or weakness in your face, arm, or leg, especially on only one side of your body. ? New problems with walking or balance. ? Sudden vision changes. ? Drooling or slurred speech. ? New problems speaking or understanding simple statements, or feeling confused. ? A sudden, severe headache that is different from past headaches.    Call your doctor now or seek immediate medical care if:    · You have new or changed symptoms of heart failure, such as:  ? New or increased shortness of breath. ? New or worse swelling in your legs, ankles, or feet.   ? Sudden weight gain, such as

## 2019-08-20 RX ORDER — RANITIDINE 150 MG/1
TABLET ORAL
Qty: 180 TABLET | Refills: 2 | Status: SHIPPED | OUTPATIENT
Start: 2019-08-20 | End: 2020-03-17 | Stop reason: SDUPTHER

## 2019-09-04 VITALS
OXYGEN SATURATION: 98 % | DIASTOLIC BLOOD PRESSURE: 81 MMHG | BODY MASS INDEX: 22.69 KG/M2 | HEART RATE: 65 BPM | WEIGHT: 172 LBS | SYSTOLIC BLOOD PRESSURE: 138 MMHG | RESPIRATION RATE: 16 BRPM

## 2019-09-04 PROBLEM — I49.9 CARDIAC ARRHYTHMIA: Status: ACTIVE | Noted: 2019-09-04

## 2019-10-17 RX ORDER — OMEPRAZOLE 40 MG/1
CAPSULE, DELAYED RELEASE ORAL
Qty: 90 CAPSULE | Refills: 0 | Status: SHIPPED | OUTPATIENT
Start: 2019-10-17 | End: 2020-01-29 | Stop reason: SDUPTHER

## 2019-12-27 RX ORDER — TAMSULOSIN HYDROCHLORIDE 0.4 MG/1
CAPSULE ORAL
Qty: 60 CAPSULE | Refills: 11 | Status: SHIPPED | OUTPATIENT
Start: 2019-12-27 | End: 2020-10-27

## 2020-01-27 ENCOUNTER — TELEPHONE (OUTPATIENT)
Dept: INTERNAL MEDICINE CLINIC | Age: 85
End: 2020-01-27

## 2020-01-27 NOTE — TELEPHONE ENCOUNTER
Spoke with daughter and stated there is no openings at all before Feb 3rd. She understood and going to call nursing home and see if they can wait until patient can come in for an appt.

## 2020-01-29 NOTE — TELEPHONE ENCOUNTER
Patient requesting a medication refill.   Medication omeprazole  Dosage 20mg  Frequencydaily  Last filled on 10/17/19  [facility]  Next office visitnone  Last regular office visit8/5/19

## 2020-01-30 RX ORDER — OMEPRAZOLE 20 MG/1
CAPSULE, DELAYED RELEASE ORAL
Qty: 60 CAPSULE | Refills: 1 | Status: SHIPPED | OUTPATIENT
Start: 2020-01-30 | End: 2020-02-21

## 2020-02-21 RX ORDER — OMEPRAZOLE 20 MG/1
CAPSULE, DELAYED RELEASE ORAL
Qty: 60 CAPSULE | Refills: 1 | Status: SHIPPED | OUTPATIENT
Start: 2020-02-21 | End: 2020-03-16

## 2020-02-21 NOTE — TELEPHONE ENCOUNTER
Requested Prescriptions     Pending Prescriptions Disp Refills    omeprazole (PRILOSEC) 20 MG delayed release capsule [Pharmacy Med Name: OMEPRAZOLE DR 20 MG CAPSULE] 60 capsule 1     Sig: TAKE 2 CAPSULES BY MOUTH EVERY DAY   Patient requesting a medication refill.   Last filled on: 1/30/20  [facility]  Next office visit: 2/27/20  Last regular office visit: 8/5/19

## 2020-02-27 ENCOUNTER — OFFICE VISIT (OUTPATIENT)
Dept: INTERNAL MEDICINE CLINIC | Age: 85
End: 2020-02-27
Payer: MEDICARE

## 2020-02-27 VITALS
HEIGHT: 73 IN | TEMPERATURE: 97.2 F | DIASTOLIC BLOOD PRESSURE: 76 MMHG | WEIGHT: 184.2 LBS | HEART RATE: 78 BPM | BODY MASS INDEX: 24.41 KG/M2 | RESPIRATION RATE: 16 BRPM | OXYGEN SATURATION: 97 % | SYSTOLIC BLOOD PRESSURE: 151 MMHG

## 2020-02-27 PROCEDURE — 99215 OFFICE O/P EST HI 40 MIN: CPT | Performed by: INTERNAL MEDICINE

## 2020-02-27 ASSESSMENT — ENCOUNTER SYMPTOMS
COLOR CHANGE: 0
DIARRHEA: 0
RHINORRHEA: 0
VOMITING: 0
PHOTOPHOBIA: 0
CHOKING: 0
EYE DISCHARGE: 0
TROUBLE SWALLOWING: 0
BACK PAIN: 0
SHORTNESS OF BREATH: 0
RESPIRATORY NEGATIVE: 1
ABDOMINAL DISTENTION: 0
VOICE CHANGE: 0
SINUS PAIN: 0
NAUSEA: 0
APNEA: 0
WHEEZING: 0
GASTROINTESTINAL NEGATIVE: 1
EYE PAIN: 0
EYE ITCHING: 0
CHEST TIGHTNESS: 0
BLOOD IN STOOL: 0
STRIDOR: 0
ALLERGIC/IMMUNOLOGIC NEGATIVE: 1
CONSTIPATION: 0
FACIAL SWELLING: 0
COUGH: 0
ABDOMINAL PAIN: 0
SINUS PRESSURE: 0
ANAL BLEEDING: 0
RECTAL PAIN: 0
EYES NEGATIVE: 1
SORE THROAT: 0
EYE REDNESS: 0

## 2020-02-27 ASSESSMENT — PATIENT HEALTH QUESTIONNAIRE - PHQ9
SUM OF ALL RESPONSES TO PHQ QUESTIONS 1-9: 0
2. FEELING DOWN, DEPRESSED OR HOPELESS: 0
1. LITTLE INTEREST OR PLEASURE IN DOING THINGS: 0
SUM OF ALL RESPONSES TO PHQ9 QUESTIONS 1 & 2: 0
SUM OF ALL RESPONSES TO PHQ QUESTIONS 1-9: 0

## 2020-02-27 NOTE — PROGRESS NOTES
Review of Systems   Constitutional: Negative. Negative for activity change, appetite change, chills, diaphoresis, fatigue, fever and unexpected weight change. HENT: Negative. Negative for congestion, dental problem, drooling, ear discharge, ear pain, facial swelling, hearing loss, mouth sores, nosebleeds, postnasal drip, rhinorrhea, sinus pressure, sinus pain, sneezing, sore throat, tinnitus, trouble swallowing and voice change. Eyes: Negative. Negative for photophobia, pain, discharge, redness, itching and visual disturbance. Respiratory: Negative. Negative for apnea, cough, choking, chest tightness, shortness of breath, wheezing and stridor. Cardiovascular: Negative. Negative for chest pain, palpitations and leg swelling. Gastrointestinal: Negative. Negative for abdominal distention, abdominal pain, anal bleeding, blood in stool, constipation, diarrhea, nausea, rectal pain and vomiting. Endocrine: Negative. Negative for cold intolerance, heat intolerance, polydipsia, polyphagia and polyuria. Genitourinary: Negative. Negative for decreased urine volume, difficulty urinating, discharge, dysuria, enuresis, flank pain, frequency, genital sores, hematuria, penile pain, penile swelling, scrotal swelling, testicular pain and urgency. Musculoskeletal: Negative. Negative for arthralgias, back pain, gait problem, joint swelling, myalgias, neck pain and neck stiffness. Skin: Negative. Negative for color change, pallor, rash and wound. Allergic/Immunologic: Negative. Negative for environmental allergies, food allergies and immunocompromised state. Neurological: Negative. Negative for dizziness, tremors, seizures, syncope, facial asymmetry, speech difficulty, weakness, light-headedness, numbness and headaches. Hematological: Negative. Negative for adenopathy. Does not bruise/bleed easily.    Psychiatric/Behavioral: Negative for agitation, behavioral problems, confusion, decreased concentration, dysphoric mood, hallucinations, self-injury, sleep disturbance and suicidal ideas. The patient is not nervous/anxious and is not hyperactive.          Memory Loss

## 2020-02-27 NOTE — PROGRESS NOTES
02/04/2019    LABALBU 4.0 02/04/2019    BILITOT 0.6 02/04/2019    ALKPHOS 90 02/04/2019    AST 11 (L) 02/04/2019    ALT 13 02/04/2019    LABGLOM >60 02/04/2019    GFRAA >60 02/04/2019    AGRATIO 1.3 02/04/2019    GLOB 3.1 02/04/2019     No results found for: TRIG  No results found for: HDL  No results found for: LDLCALC, LDLCHOLESTEROL  No results found for: PSA, PSADIA  Lab Results   Component Value Date    TSHFT4 1.45 02/04/2019     No results found for: LABA1C  No results found for: EAG    I have extensively reviewed and reconciled the medication list, discontinued medications not taking or no longer appropriate, and updated the active meds list    Prior to Visit Medications    Medication Sig Taking? Authorizing Provider   omeprazole (PRILOSEC) 20 MG delayed release capsule TAKE 2 CAPSULES BY MOUTH EVERY DAY  Segun Washington MD   tamsulosin (FLOMAX) 0.4 MG capsule TAKE 2 CAPSULES BY MOUTH EVERY DAY  Segun Washington MD   finasteride (PROSCAR) 5 MG tablet TAKE 1 TABLET BY MOUTH EVERY DAY  Segun Washington MD   ranitidine (ZANTAC) 150 MG tablet TAKE 1 TABLET BY MOUTH TWICE A DAY  Segun Washington MD   BISACODYL 5 MG EC tablet Take 1 tablet by mouth daily as needed for Constipation Take if no bowel movement by 3rd day.   Segun Washington MD   dorzolamide-timolol (COSOPT) 22.3-6.8 MG/ML ophthalmic solution INSTILL 1 DROP BY OPHTHALMIC ROUTE EVERY 12 HOURS INTO BOTH EYES  Historical Provider, MD   Incontinence Supply Disposable MISC Adult incontinence brief size XL Use as directed  Segun Washington MD   latanoprost (XALATAN) 0.005 % ophthalmic solution INT 1 GTT IN OU HS  Historical Provider, MD   timolol (TIMOPTIC) 0.5 % ophthalmic solution Place 1 drop into both eyes daily  Historical Provider, MD          ROS: 12 systems reviewed, as documented by MA    Past Medical History:   Diagnosis Date    Acid reflux     Anxiety     Arthritis     BPH (benign prostatic hyperplasia)     Constipation     Dementia  Esophagitis     Glaucoma     MRSA infection 1/7/16    urine    Neurocognitive disorder        Past Surgical History:   Procedure Laterality Date    HERNIA REPAIR Left     inguinal    KNEE CARTILAGE SURGERY Right        Social History     Socioeconomic History    Marital status: Single     Spouse name: Not on file    Number of children: 2    Years of education: Not on file    Highest education level: Not on file   Occupational History    Not on file   Social Needs    Financial resource strain: Not on file    Food insecurity:     Worry: Not on file     Inability: Not on file    Transportation needs:     Medical: Not on file     Non-medical: Not on file   Tobacco Use    Smoking status: Never Smoker    Smokeless tobacco: Never Used   Substance and Sexual Activity    Alcohol use: No    Drug use: No    Sexual activity: Not on file   Lifestyle    Physical activity:     Days per week: Not on file     Minutes per session: Not on file    Stress: Not on file   Relationships    Social connections:     Talks on phone: Not on file     Gets together: Not on file     Attends [employer] service: Not on file     Active member of club or organization: Not on file     Attends meetings of clubs or organizations: Not on file     Relationship status: Not on file    Intimate partner violence:     Fear of current or ex partner: Not on file     Emotionally abused: Not on file     Physically abused: Not on file     Forced sexual activity: Not on file   Other Topics Concern    Not on file   Social History Narrative    Not on file       Family History   Problem Relation Age of Onset    Cancer Father          Health Maintenance Due   Topic Date Due    Shingles Vaccine (2 of 3) 08/11/2014    Annual Wellness Visit (AWV)  05/29/2019    Flu vaccine (1) 09/01/2019         BP (!) 151/76 (Site: Right Upper Arm, Position: Sitting, Cuff Size: Medium Adult)   Pulse 78   Temp 97.2 °F (36.2 °C) (Oral)   Ht 6' 1\" (1.854 m) Wt 184 lb 3.2 oz (83.6 kg)   SpO2 97%   BMI 24.30 kg/m²   Body mass index is 24.3 kg/m². Physical Exam  Constitutional:       General: He is not in acute distress. Appearance: He is normal weight. He is not diaphoretic. HENT:      Head: Normocephalic and atraumatic. Right Ear: Tympanic membrane normal.      Left Ear: Tympanic membrane normal.      Nose: Nose normal.      Mouth/Throat:      Mouth: Mucous membranes are moist.      Pharynx: No oropharyngeal exudate. Eyes:      General: No scleral icterus. Right eye: No discharge. Left eye: No discharge. Conjunctiva/sclera: Conjunctivae normal.      Pupils: Pupils are equal, round, and reactive to light. Neck:      Musculoskeletal: Normal range of motion and neck supple. Thyroid: No thyromegaly. Vascular: No JVD. Trachea: No tracheal deviation. Cardiovascular:      Rate and Rhythm: Normal rate and regular rhythm. Heart sounds: Normal heart sounds. No murmur. No friction rub. No gallop. Pulmonary:      Effort: Pulmonary effort is normal. No respiratory distress. Breath sounds: Normal breath sounds. No stridor. No wheezing. Chest:      Chest wall: No tenderness. Abdominal:      General: Bowel sounds are normal. There is no distension. Palpations: Abdomen is soft. There is no mass. Tenderness: There is no abdominal tenderness. There is no guarding or rebound. Musculoskeletal: Normal range of motion. General: No tenderness. Lymphadenopathy:      Cervical: No cervical adenopathy. Skin:     General: Skin is warm and dry. Coloration: Skin is not pale. Findings: No erythema or rash. Neurological:      General: No focal deficit present. Mental Status: He is alert and oriented to person, place, and time. Cranial Nerves: No cranial nerve deficit. Motor: No abnormal muscle tone.       Coordination: Coordination normal.      Deep Tendon Reflexes: Reflexes are normal and symmetric. Reflexes normal.   Psychiatric:         Mood and Affect: Mood normal.         Behavior: Behavior normal.      Comments: Very pleasant and oriented x 4 but memory is poor         ASSESSMENT AND PLANS:      Except as noted below, all chronic problems have been reviewed and are stable to continue medications or other therapy as previously documented in the patient's chart, with changes per orders or documentation below:        Assessment and Plan: Patient received counseling and, if relevant,printed instructions for all symptoms listed in CC and HPI, as well as for all diagnoses brought onto today's visit note below. Typical counseling includes, but is not limited to, non pharmacologic measures to manage listed symptoms and conditions; appropriate use, risks and benefits for all prescribed medications; potential interactions between medications both prescribed and OTC; diet; exercise; healthy behaviors; and goalsetting to improve health. Pt.or responsible party was involved in shared decision making and had opportunity to have all questions answered. [facility] form completed with patient, faxed, and scanned into media. See for additional visit documentation. 1. Essential hypertension--running slightly high today but reports usually good readings at facility  - COMPREHENSIVE METABOLIC PANEL; Future    2. BPH with obstruction/lower urinary tract symptoms    3. Gastroesophageal reflux disease with esophagitis    4. Late onset Alzheimer's disease without behavioral disturbance ([facility] [address] --needs minimal assistance, not progressing so may be another form of dementia than Alzheimers    5. Frailty syndrome in geriatric patient  - VITAMIN B12 & FOLATE; Future    6. Screening for thyroid disorder  - TSH WITH REFLEX TO FT4; Future    7. Primary open angle glaucoma of both eyes, mild stage    8.  Urinary incontinence, unspecified type            Problem List     BPH with obstruction/lower urinary tract symptoms    Relevant Medications    finasteride (PROSCAR) 5 MG tablet    tamsulosin (FLOMAX) 0.4 MG capsule    Gastroesophageal reflux disease with esophagitis    Hypertension - Primary    Relevant Orders    COMPREHENSIVE METABOLIC PANEL    Primary open angle glaucoma of both eyes, mild stage    Relevant Medications    timolol (TIMOPTIC) 0.5 % ophthalmic solution    latanoprost (XALATAN) 0.005 % ophthalmic solution    dorzolamide-timolol (COSOPT) 22.3-6.8 MG/ML ophthalmic solution    Late onset Alzheimer's disease without behavioral disturbance (HCC)    Urinary incontinence    Relevant Medications    Incontinence Supply Disposable MISC    finasteride (PROSCAR) 5 MG tablet    tamsulosin (FLOMAX) 0.4 MG capsule    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 Patient/caregiver understands purpose and side effects. A complete  list of medications was provided in their after-visit summary. Return in about 6 months (around 8/27/2020) for f/u mult med extended. Time basedbilling: I spent over 40  minutes with this patient, and as is the nature of primary care and typical for my extended visits, over 50 percent of this visit was spent on counseling and coordination ofcare.

## 2020-03-16 RX ORDER — OMEPRAZOLE 20 MG/1
CAPSULE, DELAYED RELEASE ORAL
Qty: 60 CAPSULE | Refills: 1 | Status: SHIPPED | OUTPATIENT
Start: 2020-03-16 | End: 2020-06-02 | Stop reason: SDUPTHER

## 2020-03-17 RX ORDER — RANITIDINE 150 MG/1
TABLET ORAL
Qty: 180 TABLET | Refills: 2 | Status: SHIPPED | OUTPATIENT
Start: 2020-03-17 | End: 2020-06-02 | Stop reason: ALTCHOICE

## 2020-04-24 ENCOUNTER — TELEPHONE (OUTPATIENT)
Dept: INTERNAL MEDICINE CLINIC | Age: 85
End: 2020-04-24

## 2020-05-01 ENCOUNTER — TELEPHONE (OUTPATIENT)
Dept: INTERNAL MEDICINE CLINIC | Age: 85
End: 2020-05-01

## 2020-05-01 NOTE — TELEPHONE ENCOUNTER
Patients daughter states father has not had a laxative in a week. [facility] information to give patient OTC colace.  Please contact Al at  [phone]

## 2020-05-04 ENCOUNTER — TELEPHONE (OUTPATIENT)
Dept: INTERNAL MEDICINE CLINIC | Age: 85
End: 2020-05-04

## 2020-05-04 RX ORDER — DOCUSATE SODIUM 100 MG/1
100 CAPSULE, LIQUID FILLED ORAL DAILY
Qty: 30 CAPSULE | Refills: 11 | Status: SHIPPED | OUTPATIENT
Start: 2020-05-04 | End: 2020-10-27

## 2020-05-04 NOTE — TELEPHONE ENCOUNTER
Kristopher Schilder  ( [place] [facility] called -  they need orders for Colace. Patients insurance will no longer cover the Miralax.

## 2020-05-18 ENCOUNTER — TELEPHONE (OUTPATIENT)
Dept: INTERNAL MEDICINE CLINIC | Age: 85
End: 2020-05-18

## 2020-05-18 NOTE — TELEPHONE ENCOUNTER
Patient's daughter states that the nursing home has some Miralax left over from when the insurance did cover the medication. The patient would like an order placed that when the colace is not working it is ok to give the Miralax to the patient to help with constipation. [address] [facility] in [facility].

## 2020-06-02 RX ORDER — OMEPRAZOLE 20 MG/1
20 CAPSULE, DELAYED RELEASE ORAL DAILY
Qty: 60 CAPSULE | Refills: 5 | Status: SHIPPED | OUTPATIENT
Start: 2020-06-02 | End: 2020-06-26

## 2020-06-26 RX ORDER — OMEPRAZOLE 20 MG/1
20 CAPSULE, DELAYED RELEASE ORAL DAILY
Qty: 90 CAPSULE | Refills: 3 | Status: SHIPPED | OUTPATIENT
Start: 2020-06-26 | End: 2020-10-21 | Stop reason: SDUPTHER

## 2020-07-07 RX ORDER — LATANOPROST 50 UG/ML
SOLUTION/ DROPS OPHTHALMIC
Qty: 1 BOTTLE | Refills: 6 | OUTPATIENT
Start: 2020-07-07

## 2020-07-24 RX ORDER — RANITIDINE 150 MG/1
TABLET ORAL
Qty: 180 TABLET | Refills: 0 | OUTPATIENT
Start: 2020-07-24

## 2020-07-31 NOTE — TELEPHONE ENCOUNTER
Patient requesting a medication refill.   Medication: ranitidine 150 mg  Dosage: 150 mg  Frequency: 1 tab BID  Last filled on: 7/17/18  [facility]  Next office visit: none  Last regular office visit: 7/2/18
5

## 2020-08-13 ENCOUNTER — TELEPHONE (OUTPATIENT)
Dept: INTERNAL MEDICINE CLINIC | Age: 85
End: 2020-08-13

## 2020-08-13 NOTE — TELEPHONE ENCOUNTER
----- Message from Victorino Reed sent at 8/13/2020  3:58 PM EDT -----  Subject: Message to Provider    QUESTIONS  Information for Provider? Pt's daughter Mikaela Acuña is calling because she needs   something in writing from PCP stating pt should not be taking ranitidine    mg. Please advise. Pt is staying at [facility]   [phone]  ---------------------------------------------------------------------------  --------------  [address]  What is the best way for the office to contact you? OK to leave message on   voicemail  Preferred Call Back Phone Number? [phone]  ---------------------------------------------------------------------------  --------------  SCRIPT ANSWERS  Relationship to Patient? Other  Representative Name? Mikaela Acuña - daughter  Is the Representative on the appropriate HIPAA document in Epic?  Yes

## 2020-08-13 NOTE — TELEPHONE ENCOUNTER
Pt's daughter Phoebe Paredes is calling because she needs   something in writing from PCP stating pt should not be taking ranitidine    mg. Please advise.  Pt is staying at [address]

## 2020-09-01 RX ORDER — FINASTERIDE 5 MG/1
TABLET, FILM COATED ORAL
Qty: 90 TABLET | Refills: 0 | OUTPATIENT
Start: 2020-09-01

## 2020-09-01 NOTE — TELEPHONE ENCOUNTER
Requested Prescriptions     Pending Prescriptions Disp Refills    finasteride (PROSCAR) 5 MG tablet [Pharmacy Med Name: FINASTERIDE 5 MG TABLET] 90 tablet 0     Sig: TAKE 1 TABLET BY MOUTH EVERY DAY   Patient requesting a medication refill.   Last filled on: 6/3/20  [facility]  Next office visit: Visit date not found  Last regular office visit: 2/27/2020

## 2020-09-19 ENCOUNTER — TELEPHONE (OUTPATIENT)
Dept: ADMINISTRATIVE | Age: 85
End: 2020-09-19

## 2020-09-19 NOTE — TELEPHONE ENCOUNTER
Al is requesting to speak with someone in the office to get clarification on the directions for the patients Omeprazole 20 MG medication.

## 2020-09-21 NOTE — TELEPHONE ENCOUNTER
Noemi Montano from [address] [place] [address] called back to let us know they now have the correct dosage of his Omeprazole 20 MG once daily correct in their records.

## 2020-10-05 DIAGNOSIS — I10 ESSENTIAL HYPERTENSION: ICD-10-CM

## 2020-10-05 DIAGNOSIS — R54 FRAILTY SYNDROME IN GERIATRIC PATIENT: ICD-10-CM

## 2020-10-05 DIAGNOSIS — Z13.29 SCREENING FOR THYROID DISORDER: ICD-10-CM

## 2020-10-05 LAB
A/G RATIO: 1.5 (ref 1.1–2.2)
ALBUMIN SERPL-MCNC: 4 G/DL (ref 3.4–5)
ALP BLD-CCNC: 96 U/L (ref 40–129)
ALT SERPL-CCNC: 11 U/L (ref 10–40)
ANION GAP SERPL CALCULATED.3IONS-SCNC: 11 MMOL/L (ref 3–16)
AST SERPL-CCNC: 14 U/L (ref 15–37)
BILIRUB SERPL-MCNC: 0.4 MG/DL (ref 0–1)
BUN BLDV-MCNC: 14 MG/DL (ref 7–20)
CALCIUM SERPL-MCNC: 9.4 MG/DL (ref 8.3–10.6)
CHLORIDE BLD-SCNC: 102 MMOL/L (ref 99–110)
CO2: 25 MMOL/L (ref 21–32)
CREAT SERPL-MCNC: 1 MG/DL (ref 0.8–1.3)
FOLATE: 18.85 NG/ML (ref 4.78–24.2)
GFR AFRICAN AMERICAN: >60
GFR NON-AFRICAN AMERICAN: >60
GLOBULIN: 2.7 G/DL
GLUCOSE BLD-MCNC: 96 MG/DL (ref 70–99)
POTASSIUM SERPL-SCNC: 4.4 MMOL/L (ref 3.5–5.1)
SODIUM BLD-SCNC: 138 MMOL/L (ref 136–145)
TOTAL PROTEIN: 6.7 G/DL (ref 6.4–8.2)
TSH REFLEX FT4: 2.3 UIU/ML (ref 0.27–4.2)
VITAMIN B-12: 532 PG/ML (ref 211–911)

## 2020-10-21 NOTE — TELEPHONE ENCOUNTER
Nydia Wiseman  [facility] Clinical Staff               Subject: Message to Provider     QUESTIONS   Information for Provider? Pt daughter is requesting a med called   omeprazole he normally get it with check ups and the nurse at [facility] was giving him 2 instead of 1 and they ran out so she   wanted to know was there anyway he can get a refill or enough until   ---------------------------------------------------------------------------   --------------   [address]   What is the best way for the office to contact you? OK to leave message on   voicemail   Preferred Call Back Phone Number? [phone]   ---------------------------------------------------------------------------   --------------   SCRIPT ANSWERS   Relationship to Patient? Other   Representative Name? Geetha Noland   Is the Representative on the appropriate HIPAA document in Epic?  Yes

## 2020-10-21 NOTE — TELEPHONE ENCOUNTER
Requested Prescriptions     Pending Prescriptions Disp Refills    omeprazole (PRILOSEC) 20 MG delayed release capsule 180 capsule 3     Sig: Take 1 capsule by mouth 2 times daily   Patient requesting a medication refill.   [facility]  Next office visit: 10/27/2020  Last regular office visit: 2/27/2020

## 2020-10-22 RX ORDER — OMEPRAZOLE 20 MG/1
20 CAPSULE, DELAYED RELEASE ORAL 2 TIMES DAILY
Qty: 180 CAPSULE | Refills: 3 | Status: SHIPPED | OUTPATIENT
Start: 2020-10-22 | End: 2020-10-27

## 2020-10-27 ENCOUNTER — OFFICE VISIT (OUTPATIENT)
Dept: INTERNAL MEDICINE CLINIC | Age: 85
End: 2020-10-27
Payer: MEDICARE

## 2020-10-27 VITALS
TEMPERATURE: 97.3 F | BODY MASS INDEX: 23.59 KG/M2 | SYSTOLIC BLOOD PRESSURE: 162 MMHG | HEART RATE: 85 BPM | DIASTOLIC BLOOD PRESSURE: 81 MMHG | WEIGHT: 178 LBS | HEIGHT: 73 IN

## 2020-10-27 VITALS
DIASTOLIC BLOOD PRESSURE: 70 MMHG | BODY MASS INDEX: 23.59 KG/M2 | SYSTOLIC BLOOD PRESSURE: 138 MMHG | OXYGEN SATURATION: 98 % | WEIGHT: 178 LBS | HEART RATE: 85 BPM | HEIGHT: 73 IN | TEMPERATURE: 97.3 F

## 2020-10-27 PROCEDURE — 99214 OFFICE O/P EST MOD 30 MIN: CPT | Performed by: INTERNAL MEDICINE

## 2020-10-27 PROCEDURE — G0438 PPPS, INITIAL VISIT: HCPCS | Performed by: INTERNAL MEDICINE

## 2020-10-27 PROCEDURE — G0008 ADMIN INFLUENZA VIRUS VAC: HCPCS | Performed by: INTERNAL MEDICINE

## 2020-10-27 PROCEDURE — 90694 VACC AIIV4 NO PRSRV 0.5ML IM: CPT | Performed by: INTERNAL MEDICINE

## 2020-10-27 RX ORDER — FINASTERIDE 5 MG/1
5 TABLET, FILM COATED ORAL DAILY
Qty: 90 TABLET | Refills: 3 | Status: SHIPPED | OUTPATIENT
Start: 2020-10-27 | End: 2021-12-23

## 2020-10-27 RX ORDER — ACETAMINOPHEN 500 MG
500 TABLET ORAL 2 TIMES DAILY PRN
Qty: 120 TABLET | Refills: 5 | Status: SHIPPED | OUTPATIENT
Start: 2020-10-27

## 2020-10-27 RX ORDER — OMEPRAZOLE 20 MG/1
20 CAPSULE, DELAYED RELEASE ORAL 2 TIMES DAILY
Qty: 90 CAPSULE | Refills: 3 | Status: SHIPPED | OUTPATIENT
Start: 2020-10-27 | End: 2021-12-16

## 2020-10-27 RX ORDER — TAMSULOSIN HYDROCHLORIDE 0.4 MG/1
0.8 CAPSULE ORAL DAILY
Qty: 180 CAPSULE | Refills: 3 | Status: SHIPPED | OUTPATIENT
Start: 2020-10-27 | End: 2021-12-28 | Stop reason: SDUPTHER

## 2020-10-27 RX ORDER — POLYETHYLENE GLYCOL 3350 17 G/17G
17 POWDER, FOR SOLUTION ORAL DAILY
Qty: 1530 G | Refills: 5 | Status: SHIPPED | OUTPATIENT
Start: 2020-10-27 | End: 2020-11-26

## 2020-10-27 ASSESSMENT — PATIENT HEALTH QUESTIONNAIRE - PHQ9
SUM OF ALL RESPONSES TO PHQ QUESTIONS 1-9: 0
SUM OF ALL RESPONSES TO PHQ QUESTIONS 1-9: 0
SUM OF ALL RESPONSES TO PHQ9 QUESTIONS 1 & 2: 0
SUM OF ALL RESPONSES TO PHQ QUESTIONS 1-9: 0
1. LITTLE INTEREST OR PLEASURE IN DOING THINGS: 0
2. FEELING DOWN, DEPRESSED OR HOPELESS: 0
1. LITTLE INTEREST OR PLEASURE IN DOING THINGS: 0
SUM OF ALL RESPONSES TO PHQ QUESTIONS 1-9: 0
2. FEELING DOWN, DEPRESSED OR HOPELESS: 0
SUM OF ALL RESPONSES TO PHQ9 QUESTIONS 1 & 2: 0
SUM OF ALL RESPONSES TO PHQ QUESTIONS 1-9: 0
SUM OF ALL RESPONSES TO PHQ QUESTIONS 1-9: 0

## 2020-10-27 ASSESSMENT — ENCOUNTER SYMPTOMS
COUGH: 0
NAUSEA: 0
BLOOD IN STOOL: 0
EYE DISCHARGE: 0
EYE PAIN: 0
SORE THROAT: 0
BACK PAIN: 0
SHORTNESS OF BREATH: 0
VOMITING: 0
EYE REDNESS: 0
ABDOMINAL PAIN: 0
WHEEZING: 0
STRIDOR: 0
DIARRHEA: 0
CONSTIPATION: 0
PHOTOPHOBIA: 0

## 2020-10-27 ASSESSMENT — LIFESTYLE VARIABLES: HOW OFTEN DO YOU HAVE A DRINK CONTAINING ALCOHOL: 0

## 2020-10-27 NOTE — PROGRESS NOTES
Chief Complaint   Patient presents with    Other     medication refills       HPI: Here (accompanied by his daughter) for htn followup and management of multiple chronic conditions as per the active problems list below, which I reviewed and updated with the patient today. States doing well with no new concerns except if noted below. Things at the [address] assisted living where he resides are fairly stable. He forgot the paperwork that they gave him today left it in the car with the transport. There was an issue when he was permitted to keep his glaucoma drops in his room and self administer where he mixed frequency up and ran out too early. Now he is supposed to have assistance with all medications including the drops. I have reviewed the chart notes available from myself and other providers. I have addressed all activeproblems listed below, and created or updated the problems list as needed. I have reviewed and updated the problems list in detail with patient. 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       Discussed all labs, other tests, and imaging if available   Lab Results   Component Value Date    WBC 7.9 02/04/2019    HGB 13.5 02/04/2019    HCT 39.8 (L) 02/04/2019    MCV 93.5 02/04/2019     02/04/2019    LYMPHOPCT 18.7 02/04/2019    RBC 4.26 02/04/2019    MCH 31.7 02/04/2019 MCHC 33.9 02/04/2019    RDW 13.2 02/04/2019     Lab Results   Component Value Date     10/05/2020    K 4.4 10/05/2020     10/05/2020    CO2 25 10/05/2020    BUN 14 10/05/2020    CREATININE 1.0 10/05/2020    GLUCOSE 96 10/05/2020    CALCIUM 9.4 10/05/2020    PROT 6.7 10/05/2020    LABALBU 4.0 10/05/2020    BILITOT 0.4 10/05/2020    ALKPHOS 96 10/05/2020    AST 14 (L) 10/05/2020    ALT 11 10/05/2020    LABGLOM >60 10/05/2020    GFRAA >60 10/05/2020    AGRATIO 1.5 10/05/2020    GLOB 2.7 10/05/2020     No results found for: TRIG  No results found for: HDL  No results found for: LDLCALC, LDLCHOLESTEROL  No results found for: PSA, PSADIA  Lab Results   Component Value Date    TSHFT4 2.30 10/05/2020     No results found for: LABA1C  No results found for: EAG    I have extensively reviewed and reconciled the medication list, discontinued medications not taking or no longer appropriate, and updated the active meds list    Prior to Visit Medications    Medication Sig Taking? Authorizing Provider   omeprazole (PRILOSEC) 20 MG delayed release capsule Take 1 capsule by mouth 2 times daily  Sammy Abbott MD   docusate sodium (COLACE) 100 MG capsule Take 1 capsule by mouth daily  Sammy Abbott MD   tamsulosin (FLOMAX) 0.4 MG capsule TAKE 2 CAPSULES BY MOUTH EVERY DAY  Sammy Abbott MD   finasteride (PROSCAR) 5 MG tablet TAKE 1 TABLET BY MOUTH EVERY DAY  Sammy Abbott MD   BISACODYL 5 MG EC tablet Take 1 tablet by mouth daily as needed for Constipation Take if no bowel movement by 3rd day.   Patient not taking: Reported on 2/27/2020  Sammy Abbott MD   dorzolamide-timolol (COSOPT) 22.3-6.8 MG/ML ophthalmic solution INSTILL 1 DROP BY OPHTHALMIC ROUTE EVERY 12 HOURS INTO BOTH EYES  Historical Provider, MD   Incontinence Supply Disposable MISC Adult incontinence brief size XL Use as directed  Sammy Abbott MD   latanoprost (XALATAN) 0.005 % ophthalmic solution INT 1 GTT IN OU HS  Historical Cancer Father          Health Maintenance Due   Topic Date Due    Shingles Vaccine (2 of 3) 08/11/2014         /70   Pulse 85   Temp 97.3 °F (36.3 °C) (Infrared)   Ht 6' 1\" (1.854 m)   Wt 178 lb (80.7 kg)   SpO2 98%   BMI 23.48 kg/m²   Body mass index is 23.48 kg/m². Physical Exam  Constitutional:       General: He is not in acute distress. Appearance: He is not diaphoretic. HENT:      Head: Normocephalic and atraumatic. Nose:      Comments: masked  Eyes:      General: No scleral icterus. Right eye: No discharge. Left eye: No discharge. Conjunctiva/sclera: Conjunctivae normal.      Pupils: Pupils are equal, round, and reactive to light. Neck:      Musculoskeletal: Normal range of motion and neck supple. Thyroid: No thyromegaly. Vascular: No JVD. Trachea: No tracheal deviation. Cardiovascular:      Rate and Rhythm: Normal rate and regular rhythm. Heart sounds: Normal heart sounds. No murmur. No friction rub. No gallop. Pulmonary:      Effort: Pulmonary effort is normal. No respiratory distress. Breath sounds: Normal breath sounds. No stridor. No wheezing. Chest:      Chest wall: No tenderness. Abdominal:      General: Bowel sounds are normal. There is no distension. Palpations: Abdomen is soft. There is no mass. Tenderness: There is no abdominal tenderness. There is no guarding or rebound. Musculoskeletal: Normal range of motion. General: No tenderness. Lymphadenopathy:      Cervical: No cervical adenopathy. Skin:     General: Skin is warm and dry. Coloration: Skin is not pale. Findings: No erythema or rash. Neurological:      Mental Status: He is alert and oriented to person, place, and time. Cranial Nerves: No cranial nerve deficit. Motor: No abnormal muscle tone. Coordination: Coordination normal.      Deep Tendon Reflexes: Reflexes are normal and symmetric.  Reflexes normal. Psychiatric:      Comments: Mildly confused but covers well         ASSESSMENT AND PLANS:      Except as noted below, all chronic problems have been reviewed and are stable to continue medications or other therapy as previously documented in the patient's chart, with changes per orders or documentation below:        Assessment and Plan: Patient received counseling and, if relevant,printed instructions for all symptoms listed in CC and HPI, as well as for all diagnoses brought onto today's visit note below. Typical counseling includes, but is not limited to, non pharmacologic measures to manage listed symptoms and conditions; appropriate use, risks and benefits for all prescribed medications; potential interactions between medications both prescribed and OTC; diet; exercise; healthy behaviors; and goalsetting to improve health. Pt.or responsible party was involved in shared decision making and had opportunity to have all questions answered. 1. Essential hypertension    2. BPH with obstruction/lower urinary tract symptoms  - finasteride (PROSCAR) 5 MG tablet; Take 1 tablet by mouth daily  Dispense: 90 tablet; Refill: 3  - tamsulosin (FLOMAX) 0.4 MG capsule; Take 2 capsules by mouth daily  Dispense: 180 capsule; Refill: 3    3. 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 Frailty syndrome in geriatric patient    5. 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  - omeprazole (PRILOSEC) 20 MG delayed release capsule; Take 1 capsule by mouth 2 times daily  Dispense: 90 capsule;  Refill: 3            Problem List     BPH with obstruction/lower urinary tract symptoms    Relevant Medications    finasteride (PROSCAR) 5 MG tablet    tamsulosin (FLOMAX) 0.4 MG capsule    Gastroesophageal reflux disease with esophagitis    Relevant Medications    omeprazole (PRILOSEC) 20 MG delayed release capsule    Hypertension - Primary    Late onset Alzheimer's disease without

## 2020-10-27 NOTE — PROGRESS NOTES
Review of Systems   Constitutional: Negative for chills, fatigue, fever and unexpected weight change. HENT: Negative for congestion, ear discharge, ear pain, hearing loss, nosebleeds and sore throat. Eyes: Negative for photophobia, pain, discharge, redness and visual disturbance. Respiratory: Negative for cough, shortness of breath, wheezing and stridor. Cardiovascular: Negative for chest pain, palpitations and leg swelling. Gastrointestinal: Negative for abdominal pain, blood in stool, constipation, diarrhea, nausea and vomiting. Genitourinary: Negative for dysuria, flank pain, frequency, hematuria and urgency. Musculoskeletal: Negative for back pain, joint swelling, myalgias and neck pain. Skin: Negative for rash. Neurological: Negative for dizziness, tremors, seizures, syncope, weakness and headaches. Hematological: Does not bruise/bleed easily. Psychiatric/Behavioral: Positive for confusion. Negative for hallucinations. The patient is not nervous/anxious.

## 2020-10-27 NOTE — PATIENT INSTRUCTIONS
Personalized Preventive Plan for Divine Mac - 10/27/2020  Medicare offers a range of preventive health benefits. Some of the tests and screenings are paid in full while other may be subject to a deductible, co-insurance, and/or copay. Some of these benefits include a comprehensive review of your medical history including lifestyle, illnesses that may run in your family, and various assessments and screenings as appropriate. After reviewing your medical record and screening and assessments performed today your provider may have ordered immunizations, labs, imaging, and/or referrals for you. A list of these orders (if applicable) as well as your Preventive Care list are included within your After Visit Summary for your review. Other Preventive Recommendations:    · A preventive eye exam performed by an eye specialist is recommended every 1-2 years to screen for glaucoma; cataracts, macular degeneration, and other eye disorders. · A preventive dental visit is recommended every 6 months. · Try to get at least 150 minutes of exercise per week or 10,000 steps per day on a pedometer . · Order or download the FREE \"Exercise & Physical Activity: Your Everyday Guide\" from [employer]. Call 1-651.847.5992 or search [employer] online. · You need 4640-6644 mg of calcium and 4968-3195 IU of vitamin D per day. It is possible to meet your calcium requirement with diet alone, but a vitamin D supplement is usually necessary to meet this goal.  · When exposed to the sun, use a sunscreen that protects against both UVA and UVB radiation with an SPF of 30 or greater. Reapply every 2 to 3 hours or after sweating, drying off with a towel, or swimming. · Always wear a seat belt when traveling in a car. Always wear a helmet when riding a bicycle or motorcycle.

## 2020-10-27 NOTE — PROGRESS NOTES
Medicare Annual Wellness Visit  Name: Baljit Adam Date: 10/27/2020   MRN: [id number] Sex: Male   Age: 81 y.o. Ethnicity: Non-/Non    : 1935 Race: Amy Luis is here for Medicare AWV (AWV)    Screenings for behavioral, psychosocial and functional/safety risks, and cognitive dysfunction are all negative except as indicated below. These results, as well as other patient data from the [address] form, are documented in Flowsheets linked to this Encounter. Allergies   Allergen Reactions    Codeine      \"LOCKED BOWELS\"    Promethazine Hcl     Compazine [Prochlorperazine Maleate] Anxiety    Valium [Diazepam] Anxiety     Extreme anxiety; pt states he believed he had too strong a dose in the past and that was what caused his anxiety. Pt states \"they tell me I'm allergic to this but I don't think I am\"         Prior to Visit Medications    Medication Sig Taking?  Authorizing Provider   omeprazole (PRILOSEC) 20 MG delayed release capsule Take 1 capsule by mouth 2 times daily  Man Bains MD   finasteride (PROSCAR) 5 MG tablet Take 1 tablet by mouth daily  Man Bains MD   tamsulosin (FLOMAX) 0.4 MG capsule Take 2 capsules by mouth daily  Man Bains MD   acetaminophen (TYLENOL) 500 MG tablet Take 1 tablet by mouth 2 times daily as needed for Pain  Man Bains MD   polyethylene glycol (GLYCOLAX) 17 GM/SCOOP powder Take 17 g by mouth daily  Man Bains MD   dorzolamide-timolol (COSOPT) 22.3-6.8 MG/ML ophthalmic solution INSTILL 1 DROP BY OPHTHALMIC ROUTE EVERY 12 HOURS INTO BOTH EYES  Historical Provider, MD   Incontinence Supply Disposable MISC Adult incontinence brief size XL Use as directed  Man Bains MD   latanoprost (XALATAN) 0.005 % ophthalmic solution INT 1 GTT IN OU HS  Historical Provider, MD   timolol (TIMOPTIC) 0.5 % ophthalmic solution Place 1 drop into both eyes daily  Historical Provider, MD         Past Medical History:   Diagnosis Date    Acid reflux     Anxiety     Arthritis     BPH (benign prostatic hyperplasia)     Constipation     Dementia (HCC)     Esophagitis     Glaucoma     MRSA infection 1/7/16    urine    Neurocognitive disorder        Past Surgical History:   Procedure Laterality Date    HERNIA REPAIR Left     inguinal    KNEE CARTILAGE SURGERY Right          Family History   Problem Relation Age of Onset    Cancer Father        CareTeam (Including outside providers/suppliers regularly involved in providing care):   Patient Care Team:  Guillaume Galarza MD as PCP - General (Internal Medicine)  Guillaume Galarza MD as PCP - [facility] Empaneled Provider  NADINE Tovar as     Wt Readings from Last 3 Encounters:   10/27/20 178 lb (80.7 kg)   10/27/20 178 lb (80.7 kg)   02/27/20 184 lb 3.2 oz (83.6 kg)     Vitals:    10/27/20 1539   BP: (!) 162/81   Site: Left Upper Arm   Position: Sitting   Cuff Size: Medium Adult   Pulse: 85   Temp: 97.3 °F (36.3 °C)   TempSrc: Infrared   Weight: 178 lb (80.7 kg)   Height: 6' 1\" (1.854 m)     Body mass index is 23.48 kg/m². Based upon direct observation of the patient, evaluation of cognition reveals global memory impairment noted. Patient's complete Health Risk Assessment and screening values have been reviewed and are found in Flowsheets. The following problems were reviewed today and where indicated follow up appointments were made and/or referrals ordered.     Positive Risk Factor Screenings with Interventions:     Health Habits/Nutrition:  Health Habits/Nutrition  Do you exercise for at least 20 minutes 2-3 times per week?: Yes  Have you lost any weight without trying in the past 3 months?: No  Do you eat fewer than 2 meals per day?: No  Have you seen a dentist within the past year?: (!) No(dentures)  Body mass index: 23.48  Health Habits/Nutrition Interventions:  · Dental exam overdue:  patient encouraged to make appointment with his/her dentist    ADL:  ADLs  In the past 7 days, did you need help from others to perform any of the following everyday activities? Eating, dressing, grooming, bathing, toileting, or walking/balance?: None  In the past 7 days, did you need help from others to take care of any of the following? Laundry, housekeeping, banking/finances, shopping, telephone use, food preparation, transportation, or taking medications?: (!) Laundry, Taking Medications, Housekeeping, Banking/Finances, Shopping, Telephone Use, Food Preparation, Transportation(family and assisted living  care assist)  ADL Interventions:  · in assisted living     Personalized Preventive Plan   Current Health Maintenance Status  Immunization History   Administered Date(s) Administered    Pneumococcal Conjugate 13-valent ([id number]) 08/16/2017    Pneumococcal Polysaccharide ([id number]) 06/16/2014    Tdap (Boostrix, Adacel) 05/28/2014    Zoster Live (Zostavax) 06/16/2014        Health Maintenance   Topic Date Due    Shingles Vaccine (2 of 3) 08/11/2014    Annual Wellness Visit (AWV)  05/29/2019    Flu vaccine (1) 09/01/2020    DTaP/Tdap/Td vaccine (2 - Td) 05/28/2024    Pneumococcal 65+ years Vaccine  Completed    Hepatitis A vaccine  Aged Out    Hepatitis B vaccine  Aged Out    Hib vaccine  Aged Out    Meningococcal (ACWY) vaccine  Aged Out     Recommendations for My COI Due: see orders and patient instructions/AVS.  . Recommended screening schedule for the next 5-10 years is provided to the patient in written form: see Patient Yaritza Trejo was seen today for medicare awv.     Diagnoses and all orders for this visit:    Routine general medical examination at a health care facility

## 2021-03-05 ENCOUNTER — OFFICE VISIT (OUTPATIENT)
Dept: INTERNAL MEDICINE CLINIC | Age: 86
End: 2021-03-05
Payer: MEDICARE

## 2021-03-05 VITALS
TEMPERATURE: 97.2 F | BODY MASS INDEX: 22.56 KG/M2 | HEART RATE: 84 BPM | HEIGHT: 73 IN | OXYGEN SATURATION: 98 % | SYSTOLIC BLOOD PRESSURE: 142 MMHG | WEIGHT: 170.25 LBS | DIASTOLIC BLOOD PRESSURE: 66 MMHG

## 2021-03-05 DIAGNOSIS — N40.1 BPH WITH OBSTRUCTION/LOWER URINARY TRACT SYMPTOMS: ICD-10-CM

## 2021-03-05 DIAGNOSIS — N13.8 BPH WITH OBSTRUCTION/LOWER URINARY TRACT SYMPTOMS: ICD-10-CM

## 2021-03-05 DIAGNOSIS — F02.80 LATE ONSET ALZHEIMER'S DISEASE WITHOUT BEHAVIORAL DISTURBANCE (HCC): ICD-10-CM

## 2021-03-05 DIAGNOSIS — K21.00 GASTROESOPHAGEAL REFLUX DISEASE WITH ESOPHAGITIS WITHOUT HEMORRHAGE: ICD-10-CM

## 2021-03-05 DIAGNOSIS — R32 URINARY INCONTINENCE, UNSPECIFIED TYPE: ICD-10-CM

## 2021-03-05 DIAGNOSIS — G30.1 LATE ONSET ALZHEIMER'S DISEASE WITHOUT BEHAVIORAL DISTURBANCE (HCC): ICD-10-CM

## 2021-03-05 DIAGNOSIS — I10 ESSENTIAL HYPERTENSION: Primary | ICD-10-CM

## 2021-03-05 DIAGNOSIS — L20.84 INTRINSIC ECZEMA: ICD-10-CM

## 2021-03-05 PROCEDURE — G8427 DOCREV CUR MEDS BY ELIG CLIN: HCPCS | Performed by: INTERNAL MEDICINE

## 2021-03-05 PROCEDURE — 4040F PNEUMOC VAC/ADMIN/RCVD: CPT | Performed by: INTERNAL MEDICINE

## 2021-03-05 PROCEDURE — G8420 CALC BMI NORM PARAMETERS: HCPCS | Performed by: INTERNAL MEDICINE

## 2021-03-05 PROCEDURE — G8484 FLU IMMUNIZE NO ADMIN: HCPCS | Performed by: INTERNAL MEDICINE

## 2021-03-05 PROCEDURE — 1123F ACP DISCUSS/DSCN MKR DOCD: CPT | Performed by: INTERNAL MEDICINE

## 2021-03-05 PROCEDURE — 99214 OFFICE O/P EST MOD 30 MIN: CPT | Performed by: INTERNAL MEDICINE

## 2021-03-05 PROCEDURE — 1036F TOBACCO NON-USER: CPT | Performed by: INTERNAL MEDICINE

## 2021-03-05 ASSESSMENT — PATIENT HEALTH QUESTIONNAIRE - PHQ9
2. FEELING DOWN, DEPRESSED OR HOPELESS: 0
SUM OF ALL RESPONSES TO PHQ QUESTIONS 1-9: 0
SUM OF ALL RESPONSES TO PHQ QUESTIONS 1-9: 0

## 2021-03-12 NOTE — PROGRESS NOTES
Chief Complaint   Patient presents with    Annual Exam       HPI: Here with his daughter/guardian POA for  followup and management of multiple chronic conditions as per the active problems list below, which I reviewed and updated with the patient today. States doing well with no new concerns except if noted below. Also needs annual physical form completed for the [facility] where he resides. I have reviewed the chart notes available from myself and other providers. I have reviewed and addressed all active problems and created or updated the problems list in detail, as needed. No problem-specific Assessment & Plan notes found for this encounter. I have extensively reviewed and reconciled the medication list, discontinued medications not taking or no longer appropriate, and updated the active meds list      BP (!) 142/66   Pulse 84   Temp 97.2 °F (36.2 °C)   Ht 6' 1\" (1.854 m)   Wt 170 lb 4 oz (77.2 kg)   SpO2 98%   BMI 22.46 kg/m²   Body mass index is 22.46 kg/m². Physical Exam  Constitutional:       General: He is not in acute distress. Appearance: Normal appearance. He is not diaphoretic. HENT:      Head: Normocephalic and atraumatic. Nose:      Comments: masked  Eyes:      General: No scleral icterus. Right eye: No discharge. Left eye: No discharge. Conjunctiva/sclera: Conjunctivae normal.      Pupils: Pupils are equal, round, and reactive to light. Neck:      Musculoskeletal: Normal range of motion and neck supple. Thyroid: No thyromegaly. Vascular: No JVD. Trachea: No tracheal deviation. Cardiovascular:      Rate and Rhythm: Normal rate and regular rhythm. Heart sounds: Normal heart sounds. No murmur. No friction rub. No gallop. Pulmonary:      Effort: Pulmonary effort is normal. No respiratory distress. Breath sounds: Normal breath sounds. No stridor. No wheezing. Chest:      Chest wall: No tenderness.    Abdominal: General: Bowel sounds are normal. There is no distension. Palpations: Abdomen is soft. There is no mass. Tenderness: There is no abdominal tenderness. There is no guarding or rebound. Musculoskeletal: Normal range of motion. General: No tenderness. Lymphadenopathy:      Cervical: No cervical adenopathy. Skin:     General: Skin is warm and dry. Coloration: Skin is not pale. Findings: No erythema or rash. Neurological:      Mental Status: He is alert and oriented to person, place, and time. Cranial Nerves: No cranial nerve deficit. Motor: No abnormal muscle tone. Coordination: Coordination normal.      Deep Tendon Reflexes: Reflexes are normal and symmetric. Reflexes normal.   Psychiatric:      Comments: Poor memory which he covers well during casual conversation         ASSESSMENT AND PLANS:      Except as noted below, all chronic problems have been reviewed and are stable to continue medications or other therapy as previously documented in the patient's chart, with changes per orders or documentation below:        Assessment and Plan: Patient received counseling and, if relevant,printed instructions for all symptoms listed in CC and HPI, as well as for all diagnoses brought onto today's visit note below. Typical counseling includes, but is not limited to, non pharmacologic measures to manage listed symptoms and conditions; appropriate use, risks and benefits for all prescribed medications; potential interactions between medications both prescribed and OTC; diet; exercise; healthy behaviors; and goalsetting to improve health. Pt.or responsible party was involved in shared decision making and had opportunity to have all questions answered. 1. Essential hypertension    2. Intrinsic eczema    3. Late onset Alzheimer's disease without behavioral disturbance ([facility] [address]    4. BPH with obstruction/lower urinary tract symptoms    5.  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    6. Urinary incontinence, unspecified type            Problem List     BPH with obstruction/lower urinary tract symptoms    Relevant Medications    finasteride (PROSCAR) 5 MG tablet    tamsulosin (FLOMAX) 0.4 MG capsule    Gastroesophageal reflux disease with esophagitis    Relevant Medications    omeprazole (PRILOSEC) 20 MG delayed release capsule    Hypertension - Primary    Intrinsic eczema    Late onset Alzheimer's disease without behavioral disturbance (HCC)    Urinary incontinence    Relevant Medications    Incontinence Supply Disposable MISC    finasteride (PROSCAR) 5 MG tablet    tamsulosin (FLOMAX) 0.4 MG capsule        No orders of the defined types were placed in this encounter. I have reconciled the medications in chart with what patient reports to be taking, andreviewed action/ sideeffects and how to take any new medications. Patient/caregiver understands purpose and side effects. A complete  list of medications was provided in their after-visit summary. Return in about 6 months (around 9/5/2021) for f/u mult med extended plus FBW. Time basedbilling: I spent over 30  minutes with this patient, and as is the nature of primary care and typical for my extended visits, over 50 percent of this visit was spent on counseling and coordination ofcare.

## 2021-04-30 RX ORDER — FACIAL-BODY WIPES
EACH TOPICAL
Qty: 30 TABLET | Refills: 2 | Status: SHIPPED | OUTPATIENT
Start: 2021-04-30

## 2021-07-02 RX ORDER — DOCUSATE SODIUM 100 MG/1
CAPSULE, LIQUID FILLED ORAL
Qty: 30 CAPSULE | Refills: 11 | Status: SHIPPED | OUTPATIENT
Start: 2021-07-02

## 2021-07-19 ENCOUNTER — TELEPHONE (OUTPATIENT)
Dept: INTERNAL MEDICINE CLINIC | Age: 86
End: 2021-07-19

## 2021-07-19 NOTE — TELEPHONE ENCOUNTER
Spoke to daughter Tarik Leyva she has decided to purchase a walker for her dad, because of the frustration of working with [facility] going thru his insurance.

## 2021-07-19 NOTE — TELEPHONE ENCOUNTER
----- Message from Kendy Diaz sent at 7/19/2021 11:59 AM EDT -----  Subject: Message to Provider    QUESTIONS  Information for Provider? Dalia Cano, daughter is in urgent need of the   Certificate of medical necessity sent back to [employer] as   indicated of form that has been faxed. Shobha Trinh have both spoken   to romy about this. Patient needs 2 wheeled walker ASAP. Patient is in   [facility] comm. Please call Romy at [phone]. Thank you  ---------------------------------------------------------------------------  --------------  CALL BACK INFO  What is the best way for the office to contact you? OK to leave message on   voicemail  Preferred Call Back Phone Number? [phone]  ---------------------------------------------------------------------------  --------------  SCRIPT ANSWERS  Relationship to Patient? Other  Representative Name? romy  Is the Representative on the appropriate HIPAA document in Epic?  Yes

## 2021-07-20 PROBLEM — R26.9 GAIT DIFFICULTY: Status: ACTIVE | Noted: 2021-07-20

## 2021-07-26 ENCOUNTER — TELEPHONE (OUTPATIENT)
Dept: INTERNAL MEDICINE CLINIC | Age: 86
End: 2021-07-26

## 2021-07-26 NOTE — TELEPHONE ENCOUNTER
----- Message from Philemon Felty sent at 7/26/2021  3:29 PM EDT -----  Subject: Message to Provider    QUESTIONS  Information for Provider? Fredy Curling is calling on behalf of her father Rosendo Davila. She would like to fax the document over and have the Doctor look   before the appt. He has an appt. on Thurs.   ---------------------------------------------------------------------------  --------------  Vidal Hinders INFO  What is the best way for the office to contact you? OK to leave message on   voicemail  Preferred Call Back Phone Number? [phone]  ---------------------------------------------------------------------------  --------------  SCRIPT ANSWERS  Relationship to Patient? Guardian  Representative Name? Shobha Marsh  Is the Representative on the appropriate HIPAA document in Epic?  Yes

## 2021-07-26 NOTE — TELEPHONE ENCOUNTER
Spoke with Siomara Cleveland she wants to fax us a form that needs to be filled out for court. Patient has an appt on Thursday.

## 2021-07-29 ENCOUNTER — OFFICE VISIT (OUTPATIENT)
Dept: INTERNAL MEDICINE CLINIC | Age: 86
End: 2021-07-29
Payer: MEDICARE

## 2021-07-29 VITALS
SYSTOLIC BLOOD PRESSURE: 135 MMHG | HEART RATE: 88 BPM | HEIGHT: 73 IN | DIASTOLIC BLOOD PRESSURE: 67 MMHG | OXYGEN SATURATION: 96 % | WEIGHT: 168 LBS | BODY MASS INDEX: 22.26 KG/M2

## 2021-07-29 DIAGNOSIS — K21.00 GASTROESOPHAGEAL REFLUX DISEASE WITH ESOPHAGITIS WITHOUT HEMORRHAGE: ICD-10-CM

## 2021-07-29 DIAGNOSIS — N40.1 BPH WITH OBSTRUCTION/LOWER URINARY TRACT SYMPTOMS: ICD-10-CM

## 2021-07-29 DIAGNOSIS — M81.0 AGE-RELATED OSTEOPOROSIS WITHOUT CURRENT PATHOLOGICAL FRACTURE: ICD-10-CM

## 2021-07-29 DIAGNOSIS — M40.14 OTHER SECONDARY KYPHOSIS, THORACIC REGION: ICD-10-CM

## 2021-07-29 DIAGNOSIS — N13.8 BPH WITH OBSTRUCTION/LOWER URINARY TRACT SYMPTOMS: ICD-10-CM

## 2021-07-29 DIAGNOSIS — F02.80 LATE ONSET ALZHEIMER'S DISEASE WITHOUT BEHAVIORAL DISTURBANCE (HCC): Primary | ICD-10-CM

## 2021-07-29 DIAGNOSIS — R54 FRAILTY SYNDROME IN GERIATRIC PATIENT: ICD-10-CM

## 2021-07-29 DIAGNOSIS — G30.1 LATE ONSET ALZHEIMER'S DISEASE WITHOUT BEHAVIORAL DISTURBANCE (HCC): Primary | ICD-10-CM

## 2021-07-29 DIAGNOSIS — I10 ESSENTIAL HYPERTENSION: ICD-10-CM

## 2021-07-29 PROCEDURE — 1036F TOBACCO NON-USER: CPT | Performed by: INTERNAL MEDICINE

## 2021-07-29 PROCEDURE — 1123F ACP DISCUSS/DSCN MKR DOCD: CPT | Performed by: INTERNAL MEDICINE

## 2021-07-29 PROCEDURE — 99215 OFFICE O/P EST HI 40 MIN: CPT | Performed by: INTERNAL MEDICINE

## 2021-07-29 PROCEDURE — G8420 CALC BMI NORM PARAMETERS: HCPCS | Performed by: INTERNAL MEDICINE

## 2021-07-29 PROCEDURE — G8427 DOCREV CUR MEDS BY ELIG CLIN: HCPCS | Performed by: INTERNAL MEDICINE

## 2021-07-29 PROCEDURE — 4040F PNEUMOC VAC/ADMIN/RCVD: CPT | Performed by: INTERNAL MEDICINE

## 2021-07-29 NOTE — PROGRESS NOTES
07/18/2016       No results found for: CHOL, TRIG, HDL, LDLCALC, LDLDIRECT    Lab Results   Component Value Date    ALT 11 10/05/2020    ALT 13 02/04/2019    ALT 16 07/18/2016    AST 14 (L) 10/05/2020    AST 11 (L) 02/04/2019    AST 15 07/18/2016       No results found for: TSH, T4FREE    Lab Results   Component Value Date    WBC 7.9 02/04/2019    WBC 5.5 04/27/2016    WBC 5.3 01/07/2016    HGB 13.5 02/04/2019    HGB 13.5 04/27/2016    HGB 13.1 (L) 01/07/2016    HCT 39.8 (L) 02/04/2019    HCT 40.6 04/27/2016    HCT 38.6 (L) 01/07/2016    MCV 93.5 02/04/2019    MCV 90.7 04/27/2016    MCV 90.9 01/07/2016     02/04/2019     04/27/2016     01/07/2016       No results found for: INR     No results found for: PSA     No results found for: LABURIC          /67 (Site: Right Upper Arm, Position: Sitting)   Pulse 88   Ht 6' 1\" (1.854 m)   Wt 168 lb (76.2 kg)   SpO2 96%   BMI 22.16 kg/m²   Body mass index is 22.16 kg/m². Physical Exam  Constitutional:       General: He is not in acute distress. Appearance: He is not diaphoretic. HENT:      Head: Normocephalic and atraumatic. Nose: Nose normal.      Mouth/Throat:      Pharynx: No oropharyngeal exudate. Eyes:      General: No scleral icterus. Right eye: No discharge. Left eye: No discharge. Conjunctiva/sclera: Conjunctivae normal.      Pupils: Pupils are equal, round, and reactive to light. Neck:      Thyroid: No thyromegaly. Vascular: No JVD. Trachea: No tracheal deviation. Cardiovascular:      Rate and Rhythm: Normal rate and regular rhythm. Heart sounds: Normal heart sounds. No murmur heard. No friction rub. No gallop. Pulmonary:      Effort: Pulmonary effort is normal. No respiratory distress. Breath sounds: Normal breath sounds. No stridor. No wheezing. Chest:      Chest wall: No tenderness. Abdominal:      General: Bowel sounds are normal. There is no distension. Palpations: Abdomen is soft. There is no mass. Tenderness: There is no abdominal tenderness. There is no guarding or rebound. Musculoskeletal:         General: No tenderness. Normal range of motion. Cervical back: Normal range of motion and neck supple. Lymphadenopathy:      Cervical: No cervical adenopathy. Skin:     General: Skin is warm and dry. Coloration: Skin is not pale. Findings: No erythema or rash. Neurological:      Mental Status: He is alert and oriented to person, place, and time. Cranial Nerves: No cranial nerve deficit. Motor: No abnormal muscle tone. Coordination: Coordination normal.      Deep Tendon Reflexes: Reflexes are normal and symmetric. Reflexes normal.   Psychiatric:         Mood and Affect: Mood normal.         Behavior: Behavior normal.         ASSESSMENT AND PLANS:      Except as noted below, all chronic problems have been reviewed and are stable to continue medications or other therapy as previously documented in the patient's chart, with changes per orders or documentation below:        Assessment and Plan: Patient received counseling and, if relevant,printed instructions for all symptoms listed in CC and HPI, as well as for all diagnoses brought onto today's visit note below. Typical counseling includes, but is not limited to, non pharmacologic measures to manage listed symptoms and conditions; appropriate use, risks and benefits for all prescribed medications; potential interactions between medications both prescribed and OTC; diet; exercise; healthy behaviors; and goalsetting to improve health. Pt.or responsible party was involved in shared decision making and had opportunity to have all questions answered. Guardianship forms completed, scanned and returned to patient's daughter/guardian. 1. Late onset Alzheimer's disease without behavioral disturbance ([address]    2.  Essential hypertension    3. Age-related osteoporosis without current pathological fracture   - DEXA BONE DENSITY 2 SITES; Future    4. Other secondary kyphosis, thoracic region  - DEXA BONE DENSITY 2 SITES; Future    5. Frailty syndrome in geriatric patient    6. BPH with obstruction/lower urinary tract symptoms    7. 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            Problem List     BPH with obstruction/lower urinary tract symptoms    Relevant Medications    finasteride (PROSCAR) 5 MG tablet    tamsulosin (FLOMAX) 0.4 MG capsule    Gastroesophageal reflux disease with esophagitis    Relevant Medications    omeprazole (PRILOSEC) 20 MG delayed release capsule    Hypertension    Late onset Alzheimer's disease without behavioral disturbance (HCC) - Primary    Frailty syndrome in geriatric patient    Other secondary kyphosis, thoracic region    Relevant Orders    DEXA BONE DENSITY 2 SITES        Orders Placed This Encounter   Procedures    DEXA BONE DENSITY 2 SITES     Standing Status:   Future     Standing Expiration Date:   7/29/2022       I have reconciled the medications in chart with what patient reports to be taking, andreviewed action/ sideeffects and how to take any new medications. Patient/caregiver understands purpose and side effects. A complete  list of medications was provided in their after-visit summary. Return in about 3 months (around 10/29/2021) for f/u mult med extended with FBW. Time basedbilling: I spent over  40 minutes with this patient, and as is the nature of primary care and typical for my extended visits, over 50 percent of this visit was spent on counseling and coordination ofcare.

## 2021-12-16 DIAGNOSIS — K21.00 GASTROESOPHAGEAL REFLUX DISEASE WITH ESOPHAGITIS WITHOUT HEMORRHAGE: ICD-10-CM

## 2021-12-16 RX ORDER — OMEPRAZOLE 20 MG/1
CAPSULE, DELAYED RELEASE ORAL
Qty: 180 CAPSULE | Refills: 3 | Status: SHIPPED | OUTPATIENT
Start: 2021-12-16

## 2021-12-16 NOTE — TELEPHONE ENCOUNTER
Requested Prescriptions     Pending Prescriptions Disp Refills    omeprazole (PRILOSEC) 20 MG delayed release capsule [Pharmacy Med Name: OMEPRAZOLE DR 20 MG CAPSULE] 180 capsule 3     Sig: TAKE 1 CAPSULE BY MOUTH TWICE A DAY     Patient requesting a medication refill.   [facility]  Next office visit: Visit date not found  Last regular office visit: 7/29/2021

## 2021-12-22 DIAGNOSIS — N40.1 BPH WITH OBSTRUCTION/LOWER URINARY TRACT SYMPTOMS: ICD-10-CM

## 2021-12-22 DIAGNOSIS — N13.8 BPH WITH OBSTRUCTION/LOWER URINARY TRACT SYMPTOMS: ICD-10-CM

## 2021-12-22 NOTE — TELEPHONE ENCOUNTER
Requested Prescriptions     Pending Prescriptions Disp Refills    finasteride (PROSCAR) 5 MG tablet [Pharmacy Med Name: FINASTERIDE 5 MG TABLET] 90 tablet 3     Sig: TAKE 1 TABLET BY MOUTH EVERY DAY       Patient requesting a medication refill.   Last filled on: 09.20.2021  [facility]  Next office visit: Visit date not found  Last regular office visit: 7/29/2021

## 2021-12-23 ENCOUNTER — TELEPHONE (OUTPATIENT)
Dept: INTERNAL MEDICINE CLINIC | Age: 86
End: 2021-12-23

## 2021-12-23 RX ORDER — FINASTERIDE 5 MG/1
TABLET, FILM COATED ORAL
Qty: 90 TABLET | Refills: 3 | Status: SHIPPED | OUTPATIENT
Start: 2021-12-23

## 2021-12-27 DIAGNOSIS — N40.1 BPH WITH OBSTRUCTION/LOWER URINARY TRACT SYMPTOMS: ICD-10-CM

## 2021-12-27 DIAGNOSIS — N13.8 BPH WITH OBSTRUCTION/LOWER URINARY TRACT SYMPTOMS: ICD-10-CM

## 2021-12-27 RX ORDER — TAMSULOSIN HYDROCHLORIDE 0.4 MG/1
CAPSULE ORAL
Qty: 180 CAPSULE | Refills: 3 | OUTPATIENT
Start: 2021-12-27

## 2021-12-27 NOTE — TELEPHONE ENCOUNTER
Requested Prescriptions     Pending Prescriptions Disp Refills    tamsulosin (FLOMAX) 0.4 MG capsule [Pharmacy Med Name: TAMSULOSIN HCL 0.4 MG CAPSULE] 180 capsule 3     Sig: TAKE 2 CAPS BY MOUTH DAILY   Patient requesting a medication refill.   [facility]  Next office visit: Visit date not found  Last regular office visit: 7/29/2021

## 2021-12-28 RX ORDER — TAMSULOSIN HYDROCHLORIDE 0.4 MG/1
CAPSULE ORAL
Qty: 180 CAPSULE | Refills: 3 | OUTPATIENT
Start: 2021-12-28

## 2021-12-28 RX ORDER — TAMSULOSIN HYDROCHLORIDE 0.4 MG/1
0.8 CAPSULE ORAL DAILY
Qty: 60 CAPSULE | Refills: 0 | Status: SHIPPED | OUTPATIENT
Start: 2021-12-28 | End: 2022-02-04

## 2021-12-28 NOTE — TELEPHONE ENCOUNTER
Spoke with POA. She made an appointment to get a temporary refill until appointment time. Please refill. Requested Prescriptions     Pending Prescriptions Disp Refills    tamsulosin (FLOMAX) 0.4 MG capsule 60 capsule 0     Sig: Take 2 capsules by mouth daily     Refused Prescriptions Disp Refills    tamsulosin (FLOMAX) 0.4 MG capsule [Pharmacy Med Name: TAMSULOSIN HCL 0.4 MG CAPSULE] 180 capsule 3     Sig: TAKE 2 CAPS BY MOUTH DAILY     Refused By: Steff Manuel     Reason for Refusal: Patient needs an appointment   Patient requesting a medication refill.     [facility]  Next office visit: 1/18/22  Last regular office visit: 7/29/2021

## 2022-01-19 DIAGNOSIS — N40.1 BPH WITH OBSTRUCTION/LOWER URINARY TRACT SYMPTOMS: ICD-10-CM

## 2022-01-19 DIAGNOSIS — N13.8 BPH WITH OBSTRUCTION/LOWER URINARY TRACT SYMPTOMS: ICD-10-CM

## 2022-01-19 RX ORDER — TAMSULOSIN HYDROCHLORIDE 0.4 MG/1
CAPSULE ORAL
Qty: 60 CAPSULE | Refills: 6 | OUTPATIENT
Start: 2022-01-19

## 2022-01-19 NOTE — TELEPHONE ENCOUNTER
Requested Prescriptions     Pending Prescriptions Disp Refills    tamsulosin (FLOMAX) 0.4 MG capsule [Pharmacy Med Name: TAMSULOSIN HCL 0.4 MG CAPSULE] 60 capsule 6     Sig: TAKE 2 CAPSULES BY MOUTH EVERY DAY   Patient requesting a medication refill.   [facility]  Next office visit: Visit date not found  Last regular office visit: 7/29/2021

## 2022-02-16 ENCOUNTER — TELEPHONE (OUTPATIENT)
Dept: INTERNAL MEDICINE CLINIC | Age: 87
End: 2022-02-16

## 2022-02-16 NOTE — TELEPHONE ENCOUNTER
Gabi Wood called to let Dr Marshall Dad know that the patient has lost  22 lbs in a month, he is not eating, he will not swallow anything, he thinks it is mucus that he is spitting out, but it is food, they sent him to the hospital for shortness of breath last week the ER sent him back saying his vitals are great, he is saying the smell of food makes him feel sick. [phone] Kaleigh Brewster is the , and that is her personal number.

## 2023-01-07 DIAGNOSIS — K21.00 GASTROESOPHAGEAL REFLUX DISEASE WITH ESOPHAGITIS WITHOUT HEMORRHAGE: ICD-10-CM

## 2023-01-09 RX ORDER — OMEPRAZOLE 20 MG/1
CAPSULE, DELAYED RELEASE ORAL
Qty: 180 CAPSULE | Refills: 3 | OUTPATIENT
Start: 2023-01-09

## 2023-01-09 NOTE — TELEPHONE ENCOUNTER
Requested Prescriptions     Pending Prescriptions Disp Refills    omeprazole (PRILOSEC) 20 MG delayed release capsule [Pharmacy Med Name: OMEPRAZOLE DR 20 MG CAPSULE] 180 capsule 3     Sig: TAKE 1 CAPSULE BY MOUTH TWICE A DAY   Patient requesting a medication refill.   [facility]  Next office visit: Visit date not found  Last regular office visit: 7/29/2021    Patient needs an appointment

## 2023-01-11 DIAGNOSIS — K21.00 GASTROESOPHAGEAL REFLUX DISEASE WITH ESOPHAGITIS WITHOUT HEMORRHAGE: ICD-10-CM

## 2023-01-12 RX ORDER — OMEPRAZOLE 20 MG/1
CAPSULE, DELAYED RELEASE ORAL
Qty: 180 CAPSULE | Refills: 3 | OUTPATIENT
Start: 2023-01-12

## 2023-01-12 NOTE — TELEPHONE ENCOUNTER
Requested Prescriptions     Pending Prescriptions Disp Refills    omeprazole (PRILOSEC) 20 MG delayed release capsule [Pharmacy Med Name: OMEPRAZOLE DR 20 MG CAPSULE] 180 capsule 3     Sig: TAKE 1 CAPSULE BY MOUTH TWICE A DAY   Patient requesting a medication refill. [facility]  Next office visit: Visit date not found  Last regular office visit: 7/29/2021    Patient needs an appointment.

## 2023-01-16 DIAGNOSIS — K21.00 GASTROESOPHAGEAL REFLUX DISEASE WITH ESOPHAGITIS WITHOUT HEMORRHAGE: ICD-10-CM

## 2023-01-16 DIAGNOSIS — R32 URINARY INCONTINENCE, UNSPECIFIED TYPE: ICD-10-CM

## 2023-01-16 RX ORDER — OMEPRAZOLE 20 MG/1
CAPSULE, DELAYED RELEASE ORAL
Qty: 180 CAPSULE | Refills: 0 | Status: SHIPPED | OUTPATIENT
Start: 2023-01-16

## 2023-01-16 RX ORDER — OMEPRAZOLE 20 MG/1
CAPSULE, DELAYED RELEASE ORAL
Qty: 180 CAPSULE | Refills: 3 | Status: CANCELLED | OUTPATIENT
Start: 2023-01-16

## 2023-01-16 RX ORDER — OMEPRAZOLE 20 MG/1
CAPSULE, DELAYED RELEASE ORAL
Qty: 180 CAPSULE | Refills: 0 | Status: SHIPPED | OUTPATIENT
Start: 2023-01-16 | End: 2023-01-16 | Stop reason: SDUPTHER

## 2023-01-16 NOTE — TELEPHONE ENCOUNTER
Refills   Incontinent goes to [facility].   omeprazole to [facility]   Last visit 7/29/21  In a nursing home

## 2023-02-07 DIAGNOSIS — N40.1 BPH WITH OBSTRUCTION/LOWER URINARY TRACT SYMPTOMS: ICD-10-CM

## 2023-02-07 DIAGNOSIS — N13.8 BPH WITH OBSTRUCTION/LOWER URINARY TRACT SYMPTOMS: ICD-10-CM

## 2023-02-07 RX ORDER — FINASTERIDE 5 MG/1
TABLET, FILM COATED ORAL
Qty: 90 TABLET | Refills: 3 | Status: SHIPPED | OUTPATIENT
Start: 2023-02-07

## 2023-02-07 RX ORDER — TAMSULOSIN HYDROCHLORIDE 0.4 MG/1
CAPSULE ORAL
Qty: 180 CAPSULE | Refills: 3 | Status: SHIPPED | OUTPATIENT
Start: 2023-02-07

## 2023-02-16 ENCOUNTER — OFFICE VISIT (OUTPATIENT)
Dept: INTERNAL MEDICINE CLINIC | Age: 88
End: 2023-02-16

## 2023-02-16 VITALS
BODY MASS INDEX: 20.92 KG/M2 | OXYGEN SATURATION: 97 % | RESPIRATION RATE: 12 BRPM | SYSTOLIC BLOOD PRESSURE: 134 MMHG | DIASTOLIC BLOOD PRESSURE: 67 MMHG | HEIGHT: 71 IN | WEIGHT: 149.4 LBS | HEART RATE: 71 BPM

## 2023-02-16 DIAGNOSIS — R54 FRAILTY SYNDROME IN GERIATRIC PATIENT: ICD-10-CM

## 2023-02-16 DIAGNOSIS — R26.9 GAIT DIFFICULTY: ICD-10-CM

## 2023-02-16 DIAGNOSIS — R53.1 GENERAL WEAKNESS: ICD-10-CM

## 2023-02-16 DIAGNOSIS — F02.80 LATE ONSET ALZHEIMER'S DISEASE WITHOUT BEHAVIORAL DISTURBANCE (HCC): Primary | ICD-10-CM

## 2023-02-16 DIAGNOSIS — E43 SEVERE PROTEIN-CALORIE MALNUTRITION (HCC): ICD-10-CM

## 2023-02-16 DIAGNOSIS — E87.6 HYPOKALEMIA: ICD-10-CM

## 2023-02-16 DIAGNOSIS — E87.0 HYPERNATREMIA: ICD-10-CM

## 2023-02-16 DIAGNOSIS — Z28.21 REFUSED INFLUENZA VACCINE: ICD-10-CM

## 2023-02-16 DIAGNOSIS — R32 URINARY INCONTINENCE, UNSPECIFIED TYPE: ICD-10-CM

## 2023-02-16 DIAGNOSIS — Z00.00 MEDICARE ANNUAL WELLNESS VISIT, SUBSEQUENT: Primary | ICD-10-CM

## 2023-02-16 DIAGNOSIS — G30.1 LATE ONSET ALZHEIMER'S DISEASE WITHOUT BEHAVIORAL DISTURBANCE (HCC): Primary | ICD-10-CM

## 2023-02-16 PROBLEM — H35.30 AGE-RELATED MACULAR DEGENERATION: Status: ACTIVE | Noted: 2021-05-04

## 2023-02-16 PROBLEM — R62.7 FAILURE TO THRIVE IN ADULT: Status: ACTIVE | Noted: 2022-02-17

## 2023-02-16 PROBLEM — H40.9 GLAUCOMA: Status: ACTIVE | Noted: 2023-02-16

## 2023-02-16 LAB
A/G RATIO: 1.5 (ref 1.1–2.2)
ALBUMIN SERPL-MCNC: 4.1 G/DL (ref 3.4–5)
ALP BLD-CCNC: 76 U/L (ref 40–129)
ALT SERPL-CCNC: 9 U/L (ref 10–40)
ANION GAP SERPL CALCULATED.3IONS-SCNC: 11 MMOL/L (ref 3–16)
AST SERPL-CCNC: 12 U/L (ref 15–37)
BASOPHILS ABSOLUTE: 0 K/UL (ref 0–0.2)
BASOPHILS RELATIVE PERCENT: 0.7 %
BILIRUB SERPL-MCNC: <0.2 MG/DL (ref 0–1)
BUN BLDV-MCNC: 20 MG/DL (ref 7–20)
CALCIUM SERPL-MCNC: 9.7 MG/DL (ref 8.3–10.6)
CHLORIDE BLD-SCNC: 102 MMOL/L (ref 99–110)
CO2: 25 MMOL/L (ref 21–32)
CREAT SERPL-MCNC: 1 MG/DL (ref 0.8–1.3)
EOSINOPHILS ABSOLUTE: 0.4 K/UL (ref 0–0.6)
EOSINOPHILS RELATIVE PERCENT: 7.3 %
GFR SERPL CREATININE-BSD FRML MDRD: >60 ML/MIN/{1.73_M2}
GLUCOSE BLD-MCNC: 85 MG/DL (ref 70–99)
HCT VFR BLD CALC: 38.5 % (ref 40.5–52.5)
HEMOGLOBIN: 12.6 G/DL (ref 13.5–17.5)
LYMPHOCYTES ABSOLUTE: 2.1 K/UL (ref 1–5.1)
LYMPHOCYTES RELATIVE PERCENT: 35.2 %
MCH RBC QN AUTO: 30.4 PG (ref 26–34)
MCHC RBC AUTO-ENTMCNC: 32.7 G/DL (ref 31–36)
MCV RBC AUTO: 92.9 FL (ref 80–100)
MONOCYTES ABSOLUTE: 0.5 K/UL (ref 0–1.3)
MONOCYTES RELATIVE PERCENT: 8.7 %
NEUTROPHILS ABSOLUTE: 2.8 K/UL (ref 1.7–7.7)
NEUTROPHILS RELATIVE PERCENT: 48.1 %
PDW BLD-RTO: 14.4 % (ref 12.4–15.4)
PLATELET # BLD: 163 K/UL (ref 135–450)
PMV BLD AUTO: 12.8 FL (ref 5–10.5)
POTASSIUM SERPL-SCNC: 4.4 MMOL/L (ref 3.5–5.1)
RBC # BLD: 4.15 M/UL (ref 4.2–5.9)
SODIUM BLD-SCNC: 138 MMOL/L (ref 136–145)
TOTAL PROTEIN: 6.8 G/DL (ref 6.4–8.2)
TSH REFLEX FT4: 2.07 UIU/ML (ref 0.27–4.2)
WBC # BLD: 5.9 K/UL (ref 4–11)

## 2023-02-16 SDOH — ECONOMIC STABILITY: HOUSING INSECURITY
IN THE LAST 12 MONTHS, WAS THERE A TIME WHEN YOU DID NOT HAVE A STEADY PLACE TO SLEEP OR SLEPT IN A SHELTER (INCLUDING NOW)?: NO

## 2023-02-16 SDOH — ECONOMIC STABILITY: FOOD INSECURITY: WITHIN THE PAST 12 MONTHS, THE FOOD YOU BOUGHT JUST DIDN'T LAST AND YOU DIDN'T HAVE MONEY TO GET MORE.: NEVER TRUE

## 2023-02-16 SDOH — ECONOMIC STABILITY: FOOD INSECURITY: WITHIN THE PAST 12 MONTHS, YOU WORRIED THAT YOUR FOOD WOULD RUN OUT BEFORE YOU GOT MONEY TO BUY MORE.: NEVER TRUE

## 2023-02-16 SDOH — ECONOMIC STABILITY: INCOME INSECURITY: HOW HARD IS IT FOR YOU TO PAY FOR THE VERY BASICS LIKE FOOD, HOUSING, MEDICAL CARE, AND HEATING?: NOT HARD AT ALL

## 2023-02-16 ASSESSMENT — PATIENT HEALTH QUESTIONNAIRE - PHQ9
1. LITTLE INTEREST OR PLEASURE IN DOING THINGS: 0
2. FEELING DOWN, DEPRESSED OR HOPELESS: 0
SUM OF ALL RESPONSES TO PHQ QUESTIONS 1-9: 0
SUM OF ALL RESPONSES TO PHQ9 QUESTIONS 1 & 2: 0

## 2023-02-16 ASSESSMENT — LIFESTYLE VARIABLES
HOW MANY STANDARD DRINKS CONTAINING ALCOHOL DO YOU HAVE ON A TYPICAL DAY: PATIENT DOES NOT DRINK
HOW OFTEN DO YOU HAVE A DRINK CONTAINING ALCOHOL: NEVER

## 2023-02-16 NOTE — PROGRESS NOTES
Medicare Annual Wellness Visit    Syed Ovalles is here for scheduled E and M visit, noted overdue for AWV so added on with LPN. Assessment & Plan    Recommendations for Preventive Services Due: see orders and patient instructions/AVS.  Recommended screening schedule for the next 5-10 years is provided to the patient in written form: see Patient Instructions/AVS.     No follow-ups on file. Subjective       Patient's complete Health Risk Assessment and screening values have been reviewed and are found in Flowsheets. The following problems were reviewed today and where indicated follow up appointments were made and/or referrals ordered. No Positive Risk Factors identified today. Objective   There were no vitals filed for this visit. There is no height or weight on file to calculate BMI. Allergies   Allergen Reactions    Codeine      \"LOCKED BOWELS\"    Promethazine Hcl     Compazine [Prochlorperazine Maleate] Anxiety    Valium [Diazepam] Anxiety     Extreme anxiety; pt states he believed he had too strong a dose in the past and that was what caused his anxiety. Pt states \"they tell me I'm allergic to this but I don't think I am\"     Prior to Visit Medications    Medication Sig Taking?  Authorizing Provider   tamsulosin (FLOMAX) 0.4 MG capsule TAKE 2 CAPSULES BY MOUTH EVERY DAY  Ingris Becker MD   finasteride (PROSCAR) 5 MG tablet TAKE 1 TABLET BY MOUTH EVERY DAY  Ingris Becker MD   Incontinence Supply Disposable MISC Adult incontinence brief size XL Use as directed  Ingris Becker MD   omeprazole (PRILOSEC) 20 MG delayed release capsule TAKE 1 CAPSULE BY MOUTH TWICE A DAY  Ingris Becker MD   docusate sodium (COLACE) 100 MG capsule TAKE 1 CAPSULE BY MOUTH EVERY DAY  Ingris Becker MD   [facility] BISACODYL 5 MG EC tablet TAKE 1 TABLET BY MOUTH EVERY DAY AS NEEDED FOR CONSTIPATION TAKE IF NO BOWEL MOVEMENT BY 3RD DAY  Ingris Becker MD acetaminophen (TYLENOL) 500 MG tablet Take 1 tablet by mouth 2 times daily as needed for Pain  Patient not taking: Reported on 2/16/2023  Izzy Major MD   dorzolamide-timolol (COSOPT) 22.3-6.8 MG/ML ophthalmic solution INSTILL 1 DROP BY OPHTHALMIC ROUTE EVERY 12 HOURS INTO BOTH EYES  Historical Provider, MD   latanoprost (XALATAN) 0.005 % ophthalmic solution INT 1 GTT IN OU HS  Historical Provider, MD   timolol (TIMOPTIC) 0.5 % ophthalmic solution Place 1 drop into both eyes daily  Historical Provider, MD Murphy (Including outside providers/suppliers regularly involved in providing care):   Patient Care Team:  Izzy Major MD as PCP - General (Internal Medicine)  Izzy Major MD as PCP - Empaneled Provider  NADINE Del Valle as      Reviewed and updated this visit:         Skip Marshall LPN, 7/19/[birth year], performed the documented evaluation under the direct supervision of the attending physician. Lizbeth Roberto LPN     This encounter was performed under my, Myke Mosley, direct supervision, 2/16/2023.

## 2023-02-16 NOTE — PATIENT INSTRUCTIONS
Personalized Preventive Plan for Sae Juan - 2/16/2023  Medicare offers a range of preventive health benefits. Some of the tests and screenings are paid in full while other may be subject to a deductible, co-insurance, and/or copay. Some of these benefits include a comprehensive review of your medical history including lifestyle, illnesses that may run in your family, and various assessments and screenings as appropriate. After reviewing your medical record and screening and assessments performed today your provider may have ordered immunizations, labs, imaging, and/or referrals for you. A list of these orders (if applicable) as well as your Preventive Care list are included within your After Visit Summary for your review. Other Preventive Recommendations:    A preventive eye exam performed by an eye specialist is recommended every 1-2 years to screen for glaucoma; cataracts, macular degeneration, and other eye disorders. A preventive dental visit is recommended every 6 months. Try to get at least 150 minutes of exercise per week or 10,000 steps per day on a pedometer . Order or download the FREE \"Exercise & Physical Activity: Your Everyday Guide\" from [employer]. Call 6-508.278.4007 or search [employer] online. You need 7810-6189 mg of calcium and 5189-4517 IU of vitamin D per day. It is possible to meet your calcium requirement with diet alone, but a vitamin D supplement is usually necessary to meet this goal.  When exposed to the sun, use a sunscreen that protects against both UVA and UVB radiation with an SPF of 30 or greater. Reapply every 2 to 3 hours or after sweating, drying off with a towel, or swimming. Always wear a seat belt when traveling in a car. Always wear a helmet when riding a bicycle or motorcycle.

## 2023-02-16 NOTE — PROGRESS NOTES
Chief Complaint   Patient presents with    Hypertension       HPI: Here (last seen August 2021) for htn followup and management of multiple chronic conditions as per the active problems list on chart, which I reviewed and updated with the patient today. States doing well with no new concerns except if noted below. Daughter states his assisted living facility was on intermittent lockdowns and this is why he hasn't been in. Was hospitalized Feb 2022 with sinus infection with complications of malnutrition, etc. Covid negative? Has recovered well and notes no complications, but was surprised to learn that weight is down per our records about 20 pounds. I have reviewed the chart notes available from myself and other providers. I have reviewed and addressed all active problems and created or updated the problems list in detail, as needed. No problem-specific Assessment & Plan notes found for this encounter.       I have extensively reviewed and reconciled the medication list, discontinued medications not taking or no longer appropriate, and updated the active meds list      Lab Results   Component Value Date    LABMICR YES 07/16/2018    LABMICR YES 01/07/2016    LABMICR YES 07/19/2015       Lab Results   Component Value Date     02/16/2023     10/05/2020     02/04/2019    K 4.4 02/16/2023    K 4.4 10/05/2020    K 4.6 02/04/2019     02/16/2023     10/05/2020     02/04/2019    CO2 25 02/16/2023    CO2 25 10/05/2020    CO2 25 02/04/2019    BUN 20 02/16/2023    BUN 14 10/05/2020    BUN 15 02/04/2019    CREATININE 1.0 02/16/2023    CREATININE 1.0 10/05/2020    CREATININE 1.1 02/04/2019    GLUCOSE 85 02/16/2023    GLUCOSE 96 10/05/2020    GLUCOSE 101 (H) 02/04/2019    CALCIUM 9.7 02/16/2023    CALCIUM 9.4 10/05/2020    CALCIUM 9.7 02/04/2019       No results found for: CHOL, TRIG, HDL, LDLCALC, LDLDIRECT    Lab Results   Component Value Date    ALT 9 (L) 02/16/2023    ALT 11 10/05/2020    ALT 13 02/04/2019    AST 12 (L) 02/16/2023    AST 14 (L) 10/05/2020    AST 11 (L) 02/04/2019       No results found for: TSH, T4FREE, T3FREE    Lab Results   Component Value Date    WBC 5.9 02/16/2023    WBC 7.9 02/04/2019    WBC 5.5 04/27/2016    HGB 12.6 (L) 02/16/2023    HGB 13.5 02/04/2019    HGB 13.5 04/27/2016    HCT 38.5 (L) 02/16/2023    HCT 39.8 (L) 02/04/2019    HCT 40.6 04/27/2016    MCV 92.9 02/16/2023    MCV 93.5 02/04/2019    MCV 90.7 04/27/2016     02/16/2023     02/04/2019     04/27/2016       No results found for: INR     No results found for: PSA     No results found for: LABURIC          /67   Pulse 71   Resp 12   Ht 5' 11\" (1.803 m)   Wt 149 lb 6.4 oz (67.8 kg)   SpO2 97%   BMI 20.84 kg/m²   Body mass index is 20.84 kg/m². Physical Exam  Constitutional:       General: He is not in acute distress. Appearance: He is not diaphoretic. Comments: Stooped, obvious weight loss since last visit, frailer   HENT:      Head: Normocephalic and atraumatic. Nose:      Comments: masked  Eyes:      General: No scleral icterus. Right eye: No discharge. Left eye: No discharge. Conjunctiva/sclera: Conjunctivae normal.      Pupils: Pupils are equal, round, and reactive to light. Neck:      Thyroid: No thyromegaly. Vascular: No JVD. Trachea: No tracheal deviation. Cardiovascular:      Rate and Rhythm: Normal rate and regular rhythm. Heart sounds: Normal heart sounds. No murmur heard. No friction rub. No gallop. Pulmonary:      Effort: Pulmonary effort is normal. No respiratory distress. Breath sounds: Normal breath sounds. No stridor. No wheezing. Chest:      Chest wall: No tenderness. Abdominal:      General: Bowel sounds are normal. There is no distension. Palpations: Abdomen is soft. There is no mass. Tenderness: There is no abdominal tenderness.  There is no right CVA tenderness, left CVA tenderness, guarding or rebound. Musculoskeletal:         General: No tenderness. Normal range of motion. Cervical back: Normal range of motion and neck supple. Lymphadenopathy:      Cervical: No cervical adenopathy. Skin:     General: Skin is warm and dry. Coloration: Skin is not pale. Findings: No erythema or rash. Neurological:      General: No focal deficit present. Mental Status: He is alert and oriented to person, place, and time. Cranial Nerves: No cranial nerve deficit. Motor: Weakness present. No abnormal muscle tone. Coordination: Coordination normal.      Gait: Gait abnormal (cautious, shuffling). Deep Tendon Reflexes: Reflexes are normal and symmetric. Reflexes normal.   Psychiatric:         Mood and Affect: Mood normal.         Behavior: Behavior normal.         Thought Content: Thought content normal.         Judgment: Judgment normal.       ASSESSMENT AND PLANS:      Except as noted below, all chronic problems have been reviewed and are stable to continue medications or other therapy as previously documented in the patient's chart, with changes per orders or documentation below:        Assessment and Plan: Patient received counseling and, if relevant,printed instructions for all symptoms listed in CC and HPI, as well as for all diagnoses brought onto today's visit note below. Typical counseling includes, but is not limited to, non pharmacologic measures to manage listed symptoms and conditions; appropriate use, risks and benefits for all prescribed medications; potential interactions between medications both prescribed and OTC; diet; exercise; healthy behaviors; and goalsetting to improve health. Pt.or responsible party was involved in shared decision making and had opportunity to have all questions answered. 1. Late onset Alzheimer's disease without behavioral disturbance (HCC)--not really showing progression so brings dx into question    2. Frailty syndrome in geriatric patient--worsened with weight loss. Encouraged patient to try to increase physical activity, chair exercise, stair climbing if able    3. Severe protein-calorie malnutrition ([address]  - Comprehensive Metabolic Panel; Future  - TSH with Reflex to FT4; Future  - CBC with Auto Differential; Future    4. Hypernatremia  - Comprehensive Metabolic Panel; Future  - TSH with Reflex to FT4; Future  - CBC with Auto Differential; Future    5. Hypokalemia  - Comprehensive Metabolic Panel; Future  - TSH with Reflex to FT4; Future  - CBC with Auto Differential; Future    6. Urinary incontinence, unspecified type    7. Refused influenza vaccine    8. Gait difficulty    9.  General weakness          Problem List       Hypernatremia    Relevant Orders    Comprehensive Metabolic Panel (Completed)    TSH with Reflex to FT4 (Completed)    CBC with Auto Differential (Completed)    Hypokalemia    Relevant Orders    Comprehensive Metabolic Panel (Completed)    TSH with Reflex to FT4 (Completed)    CBC with Auto Differential (Completed)    Severe protein-calorie malnutrition (HCC)    Relevant Orders    Comprehensive Metabolic Panel (Completed)    TSH with Reflex to FT4 (Completed)    CBC with Auto Differential (Completed)    Refused influenza vaccine    General weakness    Late onset Alzheimer's disease without behavioral disturbance (HCC) - Primary    Urinary incontinence    Relevant Medications    Incontinence Supply Disposable MISC    tamsulosin (FLOMAX) 0.4 MG capsule    finasteride (PROSCAR) 5 MG tablet    Frailty syndrome in geriatric patient    Gait difficulty     Orders Placed This Encounter   Procedures    Comprehensive Metabolic Panel     Standing Status:   Future     Number of Occurrences:   1     Standing Expiration Date:   2/16/2024    TSH with Reflex to FT4     Standing Status:   Future     Number of Occurrences:   1     Standing Expiration Date:   2/16/2024    CBC with Auto Differential     Standing Status:   Future     Number of Occurrences:   1     Standing Expiration Date:   2/16/2024       I have reconciled the medications in chart with what patient reports to be taking, andreviewed action/ sideeffects and how to take any new medications. Patient/caregiver understands purpose and side effects. A complete  list of medications was provided in their after-visit summary. Return in about 6 months (around 8/16/2023) for f/u mult med extended. Time basedbilling: I spent over 40 minutes with this patient, and as is the nature of primary care and typical for my extended visits, over 50 percent of this visit was spent on counseling and coordination ofcare.

## 2023-02-20 ENCOUNTER — TELEPHONE (OUTPATIENT)
Dept: INTERNAL MEDICINE CLINIC | Age: 88
End: 2023-02-20

## 2023-02-20 NOTE — TELEPHONE ENCOUNTER
Shana [facility]  calling to check on if we received his physical and medical records   Please fill out and fax back to fax [phone]   [phone]     Please Advise

## 2023-02-28 ENCOUNTER — TELEPHONE (OUTPATIENT)
Dept: INTERNAL MEDICINE CLINIC | Age: 88
End: 2023-02-28

## 2023-02-28 NOTE — TELEPHONE ENCOUNTER
Shana, from [address] to is if we rec the fax she sent on 02/17/2023 then again on 02/22 or 02/23/23.     [phone]  Fax [phone]    Please advise

## 2023-03-06 ENCOUNTER — TELEPHONE (OUTPATIENT)
Dept: INTERNAL MEDICINE CLINIC | Age: 88
End: 2023-03-06

## 2023-03-06 NOTE — TELEPHONE ENCOUNTER
[facility]  kennedy sanderson call needs pt meds and diagnosis.   Fax [phone] att: kennedy nash

## 2023-04-17 DIAGNOSIS — K21.00 GASTROESOPHAGEAL REFLUX DISEASE WITH ESOPHAGITIS WITHOUT HEMORRHAGE: ICD-10-CM

## 2023-04-17 RX ORDER — OMEPRAZOLE 20 MG/1
CAPSULE, DELAYED RELEASE ORAL
Qty: 180 CAPSULE | Refills: 2 | Status: SHIPPED | OUTPATIENT
Start: 2023-04-17

## 2023-04-17 NOTE — TELEPHONE ENCOUNTER
Requested Prescriptions     Pending Prescriptions Disp Refills    omeprazole (PRILOSEC) 20 MG delayed release capsule [Pharmacy Med Name: OMEPRAZOLE DR 20 MG CAPSULE] 180 capsule 2     Sig: TAKE 1 CAPSULE BY MOUTH TWICE A DAY   Patient requesting a medication refill.   [facility]  Next office visit: 8/16/2023  Last regular office visit: 2/16/2023

## 2023-07-06 ENCOUNTER — TELEPHONE (OUTPATIENT)
Dept: INTERNAL MEDICINE CLINIC | Age: 88
End: 2023-07-06

## 2023-07-07 ENCOUNTER — TELEPHONE (OUTPATIENT)
Dept: INTERNAL MEDICINE CLINIC | Age: 88
End: 2023-07-07

## 2023-08-05 ENCOUNTER — APPOINTMENT (OUTPATIENT)
Dept: CT IMAGING | Age: 88
DRG: 987 | End: 2023-08-05
Payer: MEDICARE

## 2023-08-05 ENCOUNTER — APPOINTMENT (OUTPATIENT)
Dept: GENERAL RADIOLOGY | Age: 88
DRG: 987 | End: 2023-08-05
Payer: MEDICARE

## 2023-08-05 ENCOUNTER — HOSPITAL ENCOUNTER (INPATIENT)
Age: 88
LOS: 10 days | Discharge: HOME | DRG: 987 | End: 2023-08-15
Attending: STUDENT IN AN ORGANIZED HEALTH CARE EDUCATION/TRAINING PROGRAM | Admitting: INTERNAL MEDICINE
Payer: MEDICARE

## 2023-08-05 DIAGNOSIS — N17.9 AKI (ACUTE KIDNEY INJURY) (HCC): Primary | ICD-10-CM

## 2023-08-05 DIAGNOSIS — N39.0 URINARY TRACT INFECTION WITHOUT HEMATURIA, SITE UNSPECIFIED: ICD-10-CM

## 2023-08-05 DIAGNOSIS — K81.9 CHOLECYSTITIS: ICD-10-CM

## 2023-08-05 DIAGNOSIS — R13.10 DYSPHAGIA, UNSPECIFIED TYPE: ICD-10-CM

## 2023-08-05 DIAGNOSIS — K80.10 CALCULUS OF GALLBLADDER WITH CHRONIC CHOLECYSTITIS WITHOUT OBSTRUCTION: ICD-10-CM

## 2023-08-05 DIAGNOSIS — R11.2 NAUSEA AND VOMITING, UNSPECIFIED VOMITING TYPE: ICD-10-CM

## 2023-08-05 DIAGNOSIS — E86.0 DEHYDRATION: ICD-10-CM

## 2023-08-05 PROBLEM — R60.0 BILATERAL LOWER EXTREMITY EDEMA: Status: ACTIVE | Noted: 2023-08-05

## 2023-08-05 LAB
ALBUMIN SERPL-MCNC: 4.2 G/DL (ref 3.4–5)
ALBUMIN/GLOB SERPL: 1.3 {RATIO} (ref 1.1–2.2)
ALP SERPL-CCNC: 90 U/L (ref 40–129)
ALT SERPL-CCNC: 17 U/L (ref 10–40)
ANION GAP SERPL CALCULATED.3IONS-SCNC: 17 MMOL/L (ref 3–16)
AST SERPL-CCNC: 22 U/L (ref 15–37)
BACTERIA URNS QL MICRO: ABNORMAL /HPF
BASOPHILS # BLD: 0 K/UL (ref 0–0.2)
BASOPHILS NFR BLD: 0.7 %
BILIRUB SERPL-MCNC: <0.2 MG/DL (ref 0–1)
BILIRUB UR QL STRIP.AUTO: NEGATIVE
BUN SERPL-MCNC: 29 MG/DL (ref 7–20)
CALCIUM SERPL-MCNC: 9.8 MG/DL (ref 8.3–10.6)
CHLORIDE SERPL-SCNC: 108 MMOL/L (ref 99–110)
CLARITY UR: ABNORMAL
CO2 SERPL-SCNC: 19 MMOL/L (ref 21–32)
COLOR UR: YELLOW
CREAT SERPL-MCNC: 1.4 MG/DL (ref 0.8–1.3)
DEPRECATED RDW RBC AUTO: 14 % (ref 12.4–15.4)
EOSINOPHIL # BLD: 0 K/UL (ref 0–0.6)
EOSINOPHIL NFR BLD: 0.7 %
EPI CELLS #/AREA URNS AUTO: 0 /HPF (ref 0–5)
GFR SERPLBLD CREATININE-BSD FMLA CKD-EPI: 49 ML/MIN/{1.73_M2}
GLUCOSE SERPL-MCNC: 146 MG/DL (ref 70–99)
GLUCOSE UR STRIP.AUTO-MCNC: NEGATIVE MG/DL
HCT VFR BLD AUTO: 37.5 % (ref 40.5–52.5)
HGB BLD-MCNC: 12.2 G/DL (ref 13.5–17.5)
HGB UR QL STRIP.AUTO: ABNORMAL
HYALINE CASTS #/AREA URNS AUTO: 0 /LPF (ref 0–8)
KETONES UR STRIP.AUTO-MCNC: 15 MG/DL
LACTATE BLDV-SCNC: 1.2 MMOL/L (ref 0.4–1.9)
LEUKOCYTE ESTERASE UR QL STRIP.AUTO: ABNORMAL
LYMPHOCYTES # BLD: 0.8 K/UL (ref 1–5.1)
LYMPHOCYTES NFR BLD: 13.4 %
MAGNESIUM SERPL-MCNC: 2.2 MG/DL (ref 1.8–2.4)
MCH RBC QN AUTO: 30 PG (ref 26–34)
MCHC RBC AUTO-ENTMCNC: 32.5 G/DL (ref 31–36)
MCV RBC AUTO: 92.4 FL (ref 80–100)
MONOCYTES # BLD: 0.2 K/UL (ref 0–1.3)
MONOCYTES NFR BLD: 2.8 %
NEUTROPHILS # BLD: 4.8 K/UL (ref 1.7–7.7)
NEUTROPHILS NFR BLD: 82.4 %
NITRITE UR QL STRIP.AUTO: POSITIVE
NT-PROBNP SERPL-MCNC: 1487 PG/ML (ref 0–449)
PH UR STRIP.AUTO: 5 [PH] (ref 5–8)
PLATELET # BLD AUTO: 210 K/UL (ref 135–450)
PLATELET BLD QL SMEAR: ADEQUATE
PMV BLD AUTO: 11.1 FL (ref 5–10.5)
POTASSIUM SERPL-SCNC: 3.6 MMOL/L (ref 3.5–5.1)
PROT SERPL-MCNC: 7.4 G/DL (ref 6.4–8.2)
PROT UR STRIP.AUTO-MCNC: 30 MG/DL
RBC # BLD AUTO: 4.06 M/UL (ref 4.2–5.9)
RBC CLUMPS #/AREA URNS AUTO: 1 /HPF (ref 0–4)
SLIDE REVIEW: ABNORMAL
SODIUM SERPL-SCNC: 144 MMOL/L (ref 136–145)
SP GR UR STRIP.AUTO: 1.02 (ref 1–1.03)
TROPONIN, HIGH SENSITIVITY: 22 NG/L (ref 0–22)
TROPONIN, HIGH SENSITIVITY: 29 NG/L (ref 0–22)
UA COMPLETE W REFLEX CULTURE PNL UR: YES
UA DIPSTICK W REFLEX MICRO PNL UR: YES
URN SPEC COLLECT METH UR: ABNORMAL
UROBILINOGEN UR STRIP-ACNC: 0.2 E.U./DL
WBC # BLD AUTO: 5.8 K/UL (ref 4–11)
WBC #/AREA URNS AUTO: 981 /HPF (ref 0–5)

## 2023-08-05 PROCEDURE — 51702 INSERT TEMP BLADDER CATH: CPT

## 2023-08-05 PROCEDURE — 2580000003 HC RX 258: Performed by: STUDENT IN AN ORGANIZED HEALTH CARE EDUCATION/TRAINING PROGRAM

## 2023-08-05 PROCEDURE — 83880 ASSAY OF NATRIURETIC PEPTIDE: CPT

## 2023-08-05 PROCEDURE — 74176 CT ABD & PELVIS W/O CONTRAST: CPT

## 2023-08-05 PROCEDURE — 71045 X-RAY EXAM CHEST 1 VIEW: CPT

## 2023-08-05 PROCEDURE — 87086 URINE CULTURE/COLONY COUNT: CPT

## 2023-08-05 PROCEDURE — 2580000003 HC RX 258: Performed by: INTERNAL MEDICINE

## 2023-08-05 PROCEDURE — 85025 COMPLETE CBC W/AUTO DIFF WBC: CPT

## 2023-08-05 PROCEDURE — 6360000002 HC RX W HCPCS: Performed by: STUDENT IN AN ORGANIZED HEALTH CARE EDUCATION/TRAINING PROGRAM

## 2023-08-05 PROCEDURE — 83735 ASSAY OF MAGNESIUM: CPT

## 2023-08-05 PROCEDURE — 84484 ASSAY OF TROPONIN QUANT: CPT

## 2023-08-05 PROCEDURE — 6360000002 HC RX W HCPCS: Performed by: INTERNAL MEDICINE

## 2023-08-05 PROCEDURE — 96361 HYDRATE IV INFUSION ADD-ON: CPT

## 2023-08-05 PROCEDURE — 6370000000 HC RX 637 (ALT 250 FOR IP): Performed by: INTERNAL MEDICINE

## 2023-08-05 PROCEDURE — 96365 THER/PROPH/DIAG IV INF INIT: CPT

## 2023-08-05 PROCEDURE — 93005 ELECTROCARDIOGRAM TRACING: CPT | Performed by: STUDENT IN AN ORGANIZED HEALTH CARE EDUCATION/TRAINING PROGRAM

## 2023-08-05 PROCEDURE — 2060000000 HC ICU INTERMEDIATE R&B

## 2023-08-05 PROCEDURE — 36415 COLL VENOUS BLD VENIPUNCTURE: CPT

## 2023-08-05 PROCEDURE — 83605 ASSAY OF LACTIC ACID: CPT

## 2023-08-05 PROCEDURE — 96375 TX/PRO/DX INJ NEW DRUG ADDON: CPT

## 2023-08-05 PROCEDURE — 80053 COMPREHEN METABOLIC PANEL: CPT

## 2023-08-05 PROCEDURE — 99285 EMERGENCY DEPT VISIT HI MDM: CPT

## 2023-08-05 PROCEDURE — 70450 CT HEAD/BRAIN W/O DYE: CPT

## 2023-08-05 PROCEDURE — 81001 URINALYSIS AUTO W/SCOPE: CPT

## 2023-08-05 PROCEDURE — 6370000000 HC RX 637 (ALT 250 FOR IP): Performed by: STUDENT IN AN ORGANIZED HEALTH CARE EDUCATION/TRAINING PROGRAM

## 2023-08-05 RX ORDER — ACETAMINOPHEN 325 MG/1
650 TABLET ORAL EVERY 6 HOURS PRN
COMMUNITY
End: 2023-08-05

## 2023-08-05 RX ORDER — SODIUM CHLORIDE 0.9 % (FLUSH) 0.9 %
5-40 SYRINGE (ML) INJECTION EVERY 12 HOURS SCHEDULED
Status: DISCONTINUED | OUTPATIENT
Start: 2023-08-05 | End: 2023-08-15 | Stop reason: HOSPADM

## 2023-08-05 RX ORDER — CYCLOBENZAPRINE HCL 10 MG
5 TABLET ORAL 3 TIMES DAILY PRN
Status: DISCONTINUED | OUTPATIENT
Start: 2023-08-05 | End: 2023-08-10

## 2023-08-05 RX ORDER — ACETAMINOPHEN 325 MG/1
650 TABLET ORAL EVERY 6 HOURS PRN
Status: DISCONTINUED | OUTPATIENT
Start: 2023-08-05 | End: 2023-08-15 | Stop reason: HOSPADM

## 2023-08-05 RX ORDER — CYCLOBENZAPRINE HCL 5 MG
5 TABLET ORAL 3 TIMES DAILY PRN
Status: ON HOLD | COMMUNITY
End: 2023-08-10

## 2023-08-05 RX ORDER — QUETIAPINE FUMARATE 50 MG/1
50 TABLET, FILM COATED ORAL 2 TIMES DAILY
COMMUNITY
End: 2023-08-05

## 2023-08-05 RX ORDER — LATANOPROST 50 UG/ML
1 SOLUTION/ DROPS OPHTHALMIC NIGHTLY
Status: DISCONTINUED | OUTPATIENT
Start: 2023-08-05 | End: 2023-08-15 | Stop reason: HOSPADM

## 2023-08-05 RX ORDER — ENOXAPARIN SODIUM 100 MG/ML
40 INJECTION SUBCUTANEOUS DAILY
Status: DISCONTINUED | OUTPATIENT
Start: 2023-08-05 | End: 2023-08-10

## 2023-08-05 RX ORDER — SODIUM CHLORIDE 9 MG/ML
INJECTION, SOLUTION INTRAVENOUS CONTINUOUS
Status: DISCONTINUED | OUTPATIENT
Start: 2023-08-05 | End: 2023-08-07

## 2023-08-05 RX ORDER — BENZONATATE 100 MG/1
100 CAPSULE ORAL 3 TIMES DAILY PRN
Status: DISCONTINUED | OUTPATIENT
Start: 2023-08-05 | End: 2023-08-15 | Stop reason: HOSPADM

## 2023-08-05 RX ORDER — DORZOLAMIDE HCL 20 MG/ML
1 SOLUTION/ DROPS OPHTHALMIC 2 TIMES DAILY
Status: DISCONTINUED | OUTPATIENT
Start: 2023-08-05 | End: 2023-08-15 | Stop reason: HOSPADM

## 2023-08-05 RX ORDER — PANTOPRAZOLE SODIUM 40 MG/1
40 TABLET, DELAYED RELEASE ORAL
Status: DISCONTINUED | OUTPATIENT
Start: 2023-08-06 | End: 2023-08-15 | Stop reason: HOSPADM

## 2023-08-05 RX ORDER — ONDANSETRON 4 MG/1
4 TABLET, ORALLY DISINTEGRATING ORAL EVERY 8 HOURS PRN
Status: DISCONTINUED | OUTPATIENT
Start: 2023-08-05 | End: 2023-08-15 | Stop reason: HOSPADM

## 2023-08-05 RX ORDER — SODIUM CHLORIDE 0.9 % (FLUSH) 0.9 %
5-40 SYRINGE (ML) INJECTION PRN
Status: DISCONTINUED | OUTPATIENT
Start: 2023-08-05 | End: 2023-08-15 | Stop reason: HOSPADM

## 2023-08-05 RX ORDER — BISACODYL 5 MG/1
5 TABLET, DELAYED RELEASE ORAL DAILY PRN
COMMUNITY

## 2023-08-05 RX ORDER — FLUTICASONE PROPIONATE 50 MCG
2 SPRAY, SUSPENSION (ML) NASAL NIGHTLY
COMMUNITY
End: 2023-08-05

## 2023-08-05 RX ORDER — TORSEMIDE 20 MG/1
20 TABLET ORAL DAILY
COMMUNITY
End: 2023-08-05

## 2023-08-05 RX ORDER — SACCHAROMYCES BOULARDII 250 MG
250 CAPSULE ORAL 2 TIMES DAILY
COMMUNITY
End: 2023-08-05

## 2023-08-05 RX ORDER — DONEPEZIL HYDROCHLORIDE 10 MG/1
10 TABLET, FILM COATED ORAL NIGHTLY
COMMUNITY
End: 2023-08-05

## 2023-08-05 RX ORDER — DORZOLAMIDE HYDROCHLORIDE AND TIMOLOL MALEATE 20; 5 MG/ML; MG/ML
1 SOLUTION/ DROPS OPHTHALMIC 2 TIMES DAILY
Status: DISCONTINUED | OUTPATIENT
Start: 2023-08-05 | End: 2023-08-05 | Stop reason: SDUPTHER

## 2023-08-05 RX ORDER — ONDANSETRON 2 MG/ML
4 INJECTION INTRAMUSCULAR; INTRAVENOUS EVERY 6 HOURS PRN
Status: DISCONTINUED | OUTPATIENT
Start: 2023-08-05 | End: 2023-08-05 | Stop reason: SDUPTHER

## 2023-08-05 RX ORDER — METFORMIN HYDROCHLORIDE 500 MG/1
1000 TABLET, EXTENDED RELEASE ORAL
COMMUNITY
End: 2023-08-05

## 2023-08-05 RX ORDER — TAMSULOSIN HYDROCHLORIDE 0.4 MG/1
0.8 CAPSULE ORAL DAILY
Status: DISCONTINUED | OUTPATIENT
Start: 2023-08-06 | End: 2023-08-15 | Stop reason: HOSPADM

## 2023-08-05 RX ORDER — BISACODYL 5 MG/1
5 TABLET, DELAYED RELEASE ORAL DAILY PRN
Status: DISCONTINUED | OUTPATIENT
Start: 2023-08-05 | End: 2023-08-15 | Stop reason: HOSPADM

## 2023-08-05 RX ORDER — TIMOLOL MALEATE 5 MG/ML
1 SOLUTION/ DROPS OPHTHALMIC 2 TIMES DAILY
Status: DISCONTINUED | OUTPATIENT
Start: 2023-08-05 | End: 2023-08-15 | Stop reason: HOSPADM

## 2023-08-05 RX ORDER — IBUPROFEN 200 MG
400 TABLET ORAL EVERY 6 HOURS PRN
COMMUNITY
End: 2023-08-05

## 2023-08-05 RX ORDER — ALBUTEROL SULFATE 90 UG/1
2 AEROSOL, METERED RESPIRATORY (INHALATION) EVERY 4 HOURS PRN
COMMUNITY
End: 2023-08-05

## 2023-08-05 RX ORDER — LANOLIN ALCOHOL/MO/W.PET/CERES
3 CREAM (GRAM) TOPICAL NIGHTLY PRN
Status: DISCONTINUED | OUTPATIENT
Start: 2023-08-05 | End: 2023-08-15 | Stop reason: HOSPADM

## 2023-08-05 RX ORDER — QUETIAPINE FUMARATE 25 MG/1
50 TABLET, FILM COATED ORAL 2 TIMES DAILY
Status: DISCONTINUED | OUTPATIENT
Start: 2023-08-05 | End: 2023-08-05 | Stop reason: ALTCHOICE

## 2023-08-05 RX ORDER — MAGNESIUM HYDROXIDE/ALUMINUM HYDROXICE/SIMETHICONE 120; 1200; 1200 MG/30ML; MG/30ML; MG/30ML
30 SUSPENSION ORAL EVERY 6 HOURS PRN
Status: DISCONTINUED | OUTPATIENT
Start: 2023-08-05 | End: 2023-08-15 | Stop reason: HOSPADM

## 2023-08-05 RX ORDER — SODIUM CHLORIDE 9 MG/ML
INJECTION, SOLUTION INTRAVENOUS PRN
Status: DISCONTINUED | OUTPATIENT
Start: 2023-08-05 | End: 2023-08-15 | Stop reason: HOSPADM

## 2023-08-05 RX ORDER — PHENOL 1.4 %
1 AEROSOL, SPRAY (ML) MUCOUS MEMBRANE DAILY
COMMUNITY
End: 2023-08-05

## 2023-08-05 RX ORDER — ONDANSETRON 2 MG/ML
4 INJECTION INTRAMUSCULAR; INTRAVENOUS EVERY 6 HOURS PRN
Status: DISCONTINUED | OUTPATIENT
Start: 2023-08-05 | End: 2023-08-15 | Stop reason: HOSPADM

## 2023-08-05 RX ORDER — COLLOIDAL OATMEAL 1 %
CREAM (GRAM) TOPICAL PRN
COMMUNITY

## 2023-08-05 RX ORDER — BENZONATATE 100 MG/1
100 CAPSULE ORAL 3 TIMES DAILY PRN
COMMUNITY

## 2023-08-05 RX ORDER — ACETAMINOPHEN 650 MG/1
650 SUPPOSITORY RECTAL EVERY 6 HOURS PRN
Status: DISCONTINUED | OUTPATIENT
Start: 2023-08-05 | End: 2023-08-15 | Stop reason: HOSPADM

## 2023-08-05 RX ORDER — QUETIAPINE FUMARATE 25 MG/1
25 TABLET, FILM COATED ORAL 2 TIMES DAILY
COMMUNITY
End: 2023-08-05

## 2023-08-05 RX ORDER — POLYETHYLENE GLYCOL 3350 17 G/17G
17 POWDER, FOR SOLUTION ORAL DAILY PRN
COMMUNITY

## 2023-08-05 RX ORDER — FINASTERIDE 5 MG/1
5 TABLET, FILM COATED ORAL DAILY
Status: DISCONTINUED | OUTPATIENT
Start: 2023-08-06 | End: 2023-08-15 | Stop reason: HOSPADM

## 2023-08-05 RX ORDER — POLYETHYLENE GLYCOL 3350 17 G/17G
17 POWDER, FOR SOLUTION ORAL DAILY
Status: DISCONTINUED | OUTPATIENT
Start: 2023-08-06 | End: 2023-08-15 | Stop reason: HOSPADM

## 2023-08-05 RX ORDER — ESCITALOPRAM OXALATE 10 MG/1
40 TABLET ORAL DAILY
Status: DISCONTINUED | OUTPATIENT
Start: 2023-08-06 | End: 2023-08-15 | Stop reason: HOSPADM

## 2023-08-05 RX ORDER — OLANZAPINE 2.5 MG/1
2.5 TABLET ORAL DAILY
COMMUNITY
End: 2023-08-05

## 2023-08-05 RX ORDER — ACETAMINOPHEN 500 MG
1000 TABLET ORAL EVERY 8 HOURS PRN
COMMUNITY

## 2023-08-05 RX ORDER — RIVASTIGMINE TARTRATE 4.5 MG/1
4.5 CAPSULE ORAL 2 TIMES DAILY
COMMUNITY
End: 2023-08-05

## 2023-08-05 RX ORDER — MECLIZINE HCL 12.5 MG/1
25 TABLET ORAL ONCE
Status: COMPLETED | OUTPATIENT
Start: 2023-08-05 | End: 2023-08-05

## 2023-08-05 RX ORDER — LANOLIN ALCOHOL/MO/W.PET/CERES
3 CREAM (GRAM) TOPICAL NIGHTLY PRN
COMMUNITY

## 2023-08-05 RX ORDER — ESCITALOPRAM OXALATE 20 MG/1
40 TABLET ORAL DAILY
COMMUNITY

## 2023-08-05 RX ORDER — 0.9 % SODIUM CHLORIDE 0.9 %
1000 INTRAVENOUS SOLUTION INTRAVENOUS ONCE
Status: COMPLETED | OUTPATIENT
Start: 2023-08-05 | End: 2023-08-05

## 2023-08-05 RX ORDER — DOCUSATE SODIUM 100 MG/1
200 CAPSULE, LIQUID FILLED ORAL DAILY
Status: DISCONTINUED | OUTPATIENT
Start: 2023-08-06 | End: 2023-08-07

## 2023-08-05 RX ORDER — POLYETHYLENE GLYCOL 3350 17 G/17G
17 POWDER, FOR SOLUTION ORAL DAILY PRN
Status: DISCONTINUED | OUTPATIENT
Start: 2023-08-05 | End: 2023-08-08 | Stop reason: DRUGHIGH

## 2023-08-05 RX ORDER — MIRTAZAPINE 7.5 MG/1
7.5 TABLET, FILM COATED ORAL NIGHTLY
COMMUNITY
End: 2023-08-05

## 2023-08-05 RX ORDER — QUETIAPINE FUMARATE 25 MG/1
25 TABLET, FILM COATED ORAL 2 TIMES DAILY
Status: DISCONTINUED | OUTPATIENT
Start: 2023-08-05 | End: 2023-08-05 | Stop reason: ALTCHOICE

## 2023-08-05 RX ORDER — POLYVINYL ALCOHOL 14 MG/ML
1 SOLUTION/ DROPS OPHTHALMIC PRN
Status: DISCONTINUED | OUTPATIENT
Start: 2023-08-05 | End: 2023-08-15 | Stop reason: HOSPADM

## 2023-08-05 RX ORDER — MAGNESIUM HYDROXIDE/ALUMINUM HYDROXICE/SIMETHICONE 120; 1200; 1200 MG/30ML; MG/30ML; MG/30ML
30 SUSPENSION ORAL EVERY 6 HOURS PRN
COMMUNITY

## 2023-08-05 RX ADMIN — SODIUM CHLORIDE 1000 ML: 9 INJECTION, SOLUTION INTRAVENOUS at 11:44

## 2023-08-05 RX ADMIN — TIMOLOL MALEATE 1 DROP: 5 SOLUTION OPHTHALMIC at 22:16

## 2023-08-05 RX ADMIN — SODIUM CHLORIDE: 9 INJECTION, SOLUTION INTRAVENOUS at 22:18

## 2023-08-05 RX ADMIN — BENZONATATE 100 MG: 100 CAPSULE ORAL at 22:15

## 2023-08-05 RX ADMIN — SODIUM CHLORIDE, PRESERVATIVE FREE 10 ML: 5 INJECTION INTRAVENOUS at 22:16

## 2023-08-05 RX ADMIN — MECLIZINE 25 MG: 12.5 TABLET ORAL at 11:42

## 2023-08-05 RX ADMIN — CEFTRIAXONE SODIUM 1000 MG: 1 INJECTION, POWDER, FOR SOLUTION INTRAMUSCULAR; INTRAVENOUS at 16:19

## 2023-08-05 RX ADMIN — LATANOPROST 1 DROP: 50 SOLUTION OPHTHALMIC at 22:16

## 2023-08-05 RX ADMIN — DORZOLAMIDE HCL 1 DROP: 20 SOLUTION/ DROPS OPHTHALMIC at 22:16

## 2023-08-05 RX ADMIN — Medication 240 ML: at 22:28

## 2023-08-05 RX ADMIN — PIPERACILLIN AND TAZOBACTAM 3375 MG: 3; .375 INJECTION, POWDER, LYOPHILIZED, FOR SOLUTION INTRAVENOUS at 22:18

## 2023-08-05 RX ADMIN — ENOXAPARIN SODIUM 40 MG: 100 INJECTION SUBCUTANEOUS at 22:15

## 2023-08-05 RX ADMIN — ONDANSETRON 4 MG: 2 INJECTION, SOLUTION INTRAMUSCULAR; INTRAVENOUS at 13:55

## 2023-08-05 ASSESSMENT — PAIN - FUNCTIONAL ASSESSMENT: PAIN_FUNCTIONAL_ASSESSMENT: NONE - DENIES PAIN

## 2023-08-05 NOTE — ED NOTES
Upon getting specimen for urine, pt had 2 episodes of profuse vomiting. Pt states with any sudden movement, he feels like he is going to get sick. Dr. Meghan Real aware, and meds given per [facility]. Will cont to monitor.       Cliff Mendez RN  08/05/23 1117

## 2023-08-05 NOTE — ACP (ADVANCE CARE PLANNING)
Advanced Care Planning Note. Purpose of Encounter: Advanced care planning in light of REYES  Parties In Attendance: Patient  Decisional Capacity: Limited  Subjective: Patient has LE edema  Objective: Cr 1.4  Goals of Care Determination: Patient wants limited support (No CPR, no vent, no HD, no surgery, no trach, no PEG)  Plan:  IV Abx, IVF, [place], PT/OT  Code Status: Limited   Time spent on Advanced care Plannin minutes  Advanced Care Planning Documents: Completed advanced directives on chart, daughter is the POA.     Balta Rivera MD  2023 5:34 PM

## 2023-08-05 NOTE — H&P
HOSPITALISTS HISTORY AND PHYSICAL    8/5/2023 5:34 PM    Patient Information:  Ciera Berkowitz is a 81 y.o. male [id number]  PCP:  Trina Mercedes MD (Tel: [phone] )    Chief complaint:    Chief Complaint   Patient presents with    Chelsey Evans ems from [facility] d/t fall that happened last night. Pt states he does not remember falling. Pt alert and oriented during triage     Dizziness     Pt states he also endorses dizziness and overall weakness upon arrival to the ER. Also had 2 episodes of emesis PTA and upon arrival. Pt [facility]        History of Present Illness:  Ciera Berkowitz is a 81 y.o. male with history of HTN, dementia, GERD, glaucoma, BPH who came to ER from [address] [place] [address] with fall and dizziness. Patient has been constipated and has urinary frequency. Patient vomited twice. No CP, SOB, HA or abdominal pain. No fevers, chills or NS. No dysphagia, melena or hematochezia. Patient does not remember the fall. Otherwise complete ROS is negative unless listed above. REVIEW OF SYSTEMS:   Pertinent positives as noted in HPI. All other systems were reviewed and are negative. Past Medical History:   has a past medical history of Acid reflux, Anxiety, Arthritis, BPH (benign prostatic hyperplasia), Constipation, Dementia ([address]), Esophagitis, Glaucoma, MRSA infection, and Neurocognitive disorder. Past Surgical History:   has a past surgical history that includes Knee cartilage surgery (Right) and hernia repair (Left). Medications:  No current facility-administered medications on file prior to encounter.      Current Outpatient Medications on File Prior to Encounter   Medication Sig Dispense Refill    cyclobenzaprine (FLEXERIL) 5 MG tablet Take 1 tablet by mouth 3 times daily as needed for Muscle spasms      benzonatate (TESSALON) 100 MG capsule Take 1 capsule by

## 2023-08-05 NOTE — PROGRESS NOTES
Pharmacy Home Medication Reconciliation Note    A medication reconciliation has been completed for Humberto Gibson 1935    Pharmacy: [email or web address] [id number]   Information provided by: Medication List     The patient's home medication list is as follows: No current facility-administered medications on file prior to encounter.      Current Outpatient Medications on File Prior to Encounter   Medication Sig Dispense Refill    cyclobenzaprine (FLEXERIL) 5 MG tablet Take 1 tablet by mouth 3 times daily as needed for Muscle spasms (Patient not taking: Reported on 8/5/2023)      benzonatate (TESSALON) 100 MG capsule Take 1 capsule by mouth 3 times daily as needed for Cough (Patient not taking: Reported on 8/5/2023)      donepezil (ARICEPT) 10 MG tablet Take 1 tablet by mouth nightly      fluticasone (FLONASE) 50 MCG/ACT nasal spray 2 sprays by Each Nostril route at bedtime      saccharomyces boulardii (FLORASTOR) 250 MG capsule Take 1 capsule by mouth 2 times daily      ibuprofen (ADVIL;MOTRIN) 200 MG tablet Take 2 tablets by mouth every 6 hours as needed for Pain      escitalopram (LEXAPRO) 20 MG tablet Take 2 tablets by mouth daily      metFORMIN (GLUCOPHAGE-XR) 500 MG extended release tablet Take 2 tablets by mouth daily (with breakfast)      Multiple Vitamins-Minerals (MULTIVITAMIN ADULTS 50+) TABS Take 1 tablet by mouth daily      POLYETHYLENE GLYCOL 400 OP Apply 1 drop to eye daily      albuterol sulfate HFA (PROVENTIL HFA) 108 (90 Base) MCG/ACT inhaler Inhale 2 puffs into the lungs every 4 hours as needed for Wheezing      mirtazapine (REMERON) 7.5 MG tablet Take 1 tablet by mouth nightly      Riboflavin 100 MG TABS Take 100 mg by mouth daily      rivastigmine (EXELON) 4.5 MG capsule Take 1 capsule by mouth 2 times daily      QUEtiapine (SEROQUEL) 25 MG tablet Take 1 tablet by mouth 2 times daily      QUEtiapine (SEROQUEL) 50 MG tablet Take 1 tablet by mouth 2 times daily      torsemide

## 2023-08-05 NOTE — PROGRESS NOTES
Pharmacy Home Medication Reconciliation Note    A medication reconciliation has been completed for Jackelyn Smith 1935    Pharmacy: [facility] [email or web address]   Information provided by: Medication List     The patient's home medication list is as follows: No current facility-administered medications on file prior to encounter.      Current Outpatient Medications on File Prior to Encounter   Medication Sig Dispense Refill    cyclobenzaprine (FLEXERIL) 5 MG tablet Take 1 tablet by mouth 3 times daily as needed for Muscle spasms      benzonatate (TESSALON) 100 MG capsule Take 1 capsule by mouth 3 times daily as needed for Cough      escitalopram (LEXAPRO) 20 MG tablet Take 2 tablets by mouth daily      bisacodyl (DULCOLAX) 5 MG EC tablet Take 1 tablet by mouth daily as needed for Constipation      Calcium Carbonate Antacid (CALCIUM CARBONATE PO) Take 1 tablet by mouth daily      Colloidal Oatmeal (EUCERIN BABY ECZEMA RELIEF) 1 % CREA Apply topically as needed      melatonin 3 MG TABS tablet Take 1 tablet by mouth nightly as needed      polyethylene glycol (GLYCOLAX) 17 g packet Take 1 packet by mouth daily as needed for Constipation      aluminum & magnesium hydroxide-simethicone (MAALOX) 200-200-20 MG/5ML SUSP suspension Take 30 mLs by mouth every 6 hours as needed for Indigestion      acetaminophen (TYLENOL) 500 MG tablet Take 2 tablets by mouth every 8 hours as needed for Pain      omeprazole (PRILOSEC) 20 MG delayed release capsule TAKE 1 CAPSULE BY MOUTH TWICE A  capsule 2    [DISCONTINUED] donepezil (ARICEPT) 10 MG tablet Take 1 tablet by mouth nightly      [DISCONTINUED] fluticasone (FLONASE) 50 MCG/ACT nasal spray 2 sprays by Each Nostril route at bedtime      [DISCONTINUED] saccharomyces boulardii (FLORASTOR) 250 MG capsule Take 1 capsule by mouth 2 times daily      [DISCONTINUED] ibuprofen (ADVIL;MOTRIN) 200 MG tablet Take 2 tablets by mouth every 6 hours as needed for Pain      [DISCONTINUED] metFORMIN

## 2023-08-05 NOTE — ED PROVIDER NOTES
Difficulty of Paying Living Expenses: Not hard at all   Food Insecurity: No Food Insecurity    Worried About [place] in the Last Year: Never true    Ran Out of Food in the Last Year: Never true   Transportation Needs: Unknown    Lack of Transportation (Non-Medical): No   Physical Activity: Sufficiently Active    Days of Exercise per Week: 5 days    Minutes of Exercise per Session: 30 min   Housing Stability: Unknown    Unstable Housing in the Last Year: No       SCREENINGS   NIH Stroke Scale  Interval: Baseline  Level of Consciousness (1a): Alert  LOC Questions (1b): Answers both correctly  LOC Commands (1c): Performs both tasks correctly  Best Gaze (2): Normal  Visual (3): No visual loss  Facial Palsy (4): Normal symmetrical movement  Motor Arm, Left (5a): No drift  Motor Arm, Right (5b): No drift  Motor Leg, Left (6a): No drift  Motor Leg, Right (6b): No drift  Limb Ataxia (7): Absent  Sensory (8): Normal  Best Language (9): No aphasia  Dysarthria (10): Normal  Extinction and Inattention (11): No abnormality  Total: 0    [place] Coma Scale  Eye Opening: Spontaneous  Best Verbal Response: Oriented  Best Motor Response: Obeys commands  [place] Coma Scale Score: 15               PHYSICAL EXAM    (up to 7 for level 4, 8 or more for level 5)     ED Triage Vitals   BP Temp Temp src Pulse Resp SpO2 Height Weight   -- -- -- -- -- -- -- --       Physical Exam  Constitutional:       Appearance: Normal appearance. HENT:      Head: Normocephalic and atraumatic. Eyes:      General:         Right eye: No discharge. Left eye: No discharge. Extraocular Movements: Extraocular movements intact. Conjunctiva/sclera: Conjunctivae normal.   Cardiovascular:      Rate and Rhythm: Normal rate and regular rhythm. Heart sounds: Normal heart sounds. No murmur heard. Pulmonary:      Effort: Pulmonary effort is normal. No respiratory distress. Breath sounds: Normal breath sounds.    Abdominal: Dehydration    3. Nausea and vomiting, unspecified vomiting type    4. Urinary tract infection without hematuria, site unspecified          DISPOSITION/PLAN   DISPOSITION Decision To Admit 08/05/2023 03:07:21 PM      PATIENT REFERRED TO:  No follow-up provider specified. DISCHARGE MEDICATIONS:      New Prescriptions    No medications on file       Controlled Substances Monitoring:    If the patient was prescribed a controlled substance today, the PDMP was reviewed as documented below. No flowsheet data found.     (Please note that portions of this note were completed with a voice recognition program.  Efforts were made to edit the dictations but occasionally words are mis-transcribed.)    Saurabh Uribe MD (electronically signed)  Attending Emergency Physician           Saurabh Uribe MD  08/05/23 [id number]

## 2023-08-05 NOTE — ED NOTES
Report given to Yunior Rich RN. No further questions at this time.       Navjot Gore RN  08/05/23 [id number]

## 2023-08-05 NOTE — ED NOTES
ED TO INPATIENT SBAR HANDOFF    Patient Name: Jesus Rosario   :  1935  81 y.o. MRN:  [id number]  Preferred Name  Reina Burkett   ED Room #:  ED-0016/16  Family/Caregiver Present no   Restraints no   Sitter no   Sepsis Risk Score Sepsis Risk Score: 1.7    Situation  Code Status: Prior No additional code details. Allergies: Codeine, Promethazine hcl, Compazine [prochlorperazine maleate], and Valium [diazepam]  Weight: Patient Vitals for the past 96 hrs (Last 3 readings):   Weight   23 1103 149 lb (67.6 kg)     Arrived from: nursing home  Chief Complaint:   Chief Complaint   Patient presents with    Fraire Fina ems from [facility] d/t fall that happened last night. Pt states he does not remember falling. Pt alert and oriented during triage     Dizziness     Pt states he also endorses dizziness and overall weakness upon arrival to the ER. Also had 2 episodes of emesis PTA and upon arrival. [facility]Diagnosis:  Principal Problem:    UTI (urinary tract infection)  Active Problems:    Severe protein-calorie malnutrition (HCC)    BPH with obstruction/lower urinary tract symptoms    Hypertension    Late onset Alzheimer's disease without behavioral disturbance (HCC)    REYES (acute kidney injury) ([address])    Bilateral lower extremity edema    Complicated UTI (urinary tract infection)  Resolved Problems:    * No resolved hospital problems. *    Imaging:   CT ABDOMEN PELVIS WO CONTRAST Additional Contrast? None   Final Result   1. Patchy consolidation at the right lung base is suspicious for pneumonia. 2. Cholelithiasis. 3. Large amount of stool in the rectum. 4. Status post left inguinal hernia repair with recurrent small-moderate   fat-containing hernia extending into the scrotum. Small fat-containing right   inguinal hernia. CT Head W/O Contrast   Final Result   No acute intracranial abnormality.          XR CHEST PORTABLE   Final Result   No radiographic

## 2023-08-06 LAB
ANION GAP SERPL CALCULATED.3IONS-SCNC: 13 MMOL/L (ref 3–16)
BACTERIA UR CULT: NORMAL
BASOPHILS # BLD: 0.1 K/UL (ref 0–0.2)
BASOPHILS NFR BLD: 0.6 %
BUN SERPL-MCNC: 35 MG/DL (ref 7–20)
CALCIUM SERPL-MCNC: 9 MG/DL (ref 8.3–10.6)
CHLORIDE SERPL-SCNC: 113 MMOL/L (ref 99–110)
CO2 SERPL-SCNC: 19 MMOL/L (ref 21–32)
CREAT SERPL-MCNC: 1.4 MG/DL (ref 0.8–1.3)
DEPRECATED RDW RBC AUTO: 13.9 % (ref 12.4–15.4)
EKG ATRIAL RATE: 76 BPM
EKG DIAGNOSIS: NORMAL
EKG P AXIS: 67 DEGREES
EKG P-R INTERVAL: 150 MS
EKG Q-T INTERVAL: 436 MS
EKG QRS DURATION: 78 MS
EKG QTC CALCULATION (BAZETT): 490 MS
EKG R AXIS: -33 DEGREES
EKG T AXIS: 41 DEGREES
EKG VENTRICULAR RATE: 76 BPM
EOSINOPHIL # BLD: 0 K/UL (ref 0–0.6)
EOSINOPHIL NFR BLD: 0.1 %
GFR SERPLBLD CREATININE-BSD FMLA CKD-EPI: 49 ML/MIN/{1.73_M2}
GLUCOSE SERPL-MCNC: 111 MG/DL (ref 70–99)
HCT VFR BLD AUTO: 36.5 % (ref 40.5–52.5)
HGB BLD-MCNC: 11.9 G/DL (ref 13.5–17.5)
LYMPHOCYTES # BLD: 0.8 K/UL (ref 1–5.1)
LYMPHOCYTES NFR BLD: 5.7 %
MAGNESIUM SERPL-MCNC: 2.1 MG/DL (ref 1.8–2.4)
MCH RBC QN AUTO: 30 PG (ref 26–34)
MCHC RBC AUTO-ENTMCNC: 32.5 G/DL (ref 31–36)
MCV RBC AUTO: 92.4 FL (ref 80–100)
MONOCYTES # BLD: 0.6 K/UL (ref 0–1.3)
MONOCYTES NFR BLD: 4.7 %
NEUTROPHILS # BLD: 12.1 K/UL (ref 1.7–7.7)
NEUTROPHILS NFR BLD: 88.9 %
PLATELET # BLD AUTO: 200 K/UL (ref 135–450)
PMV BLD AUTO: 11.3 FL (ref 5–10.5)
POTASSIUM SERPL-SCNC: 3.4 MMOL/L (ref 3.5–5.1)
RBC # BLD AUTO: 3.96 M/UL (ref 4.2–5.9)
SODIUM SERPL-SCNC: 145 MMOL/L (ref 136–145)
WBC # BLD AUTO: 13.6 K/UL (ref 4–11)

## 2023-08-06 PROCEDURE — 93010 ELECTROCARDIOGRAM REPORT: CPT | Performed by: INTERNAL MEDICINE

## 2023-08-06 PROCEDURE — 97116 GAIT TRAINING THERAPY: CPT

## 2023-08-06 PROCEDURE — 36415 COLL VENOUS BLD VENIPUNCTURE: CPT

## 2023-08-06 PROCEDURE — 6370000000 HC RX 637 (ALT 250 FOR IP): Performed by: INTERNAL MEDICINE

## 2023-08-06 PROCEDURE — 2060000000 HC ICU INTERMEDIATE R&B

## 2023-08-06 PROCEDURE — 97166 OT EVAL MOD COMPLEX 45 MIN: CPT

## 2023-08-06 PROCEDURE — 2580000003 HC RX 258: Performed by: INTERNAL MEDICINE

## 2023-08-06 PROCEDURE — 85025 COMPLETE CBC W/AUTO DIFF WBC: CPT

## 2023-08-06 PROCEDURE — 6360000002 HC RX W HCPCS: Performed by: INTERNAL MEDICINE

## 2023-08-06 PROCEDURE — 83735 ASSAY OF MAGNESIUM: CPT

## 2023-08-06 PROCEDURE — 92526 ORAL FUNCTION THERAPY: CPT

## 2023-08-06 PROCEDURE — 97530 THERAPEUTIC ACTIVITIES: CPT

## 2023-08-06 PROCEDURE — 97535 SELF CARE MNGMENT TRAINING: CPT

## 2023-08-06 PROCEDURE — 92610 EVALUATE SWALLOWING FUNCTION: CPT

## 2023-08-06 PROCEDURE — 80048 BASIC METABOLIC PNL TOTAL CA: CPT

## 2023-08-06 PROCEDURE — 97162 PT EVAL MOD COMPLEX 30 MIN: CPT

## 2023-08-06 RX ORDER — FUROSEMIDE 10 MG/ML
20 INJECTION INTRAMUSCULAR; INTRAVENOUS ONCE
Status: COMPLETED | OUTPATIENT
Start: 2023-08-06 | End: 2023-08-06

## 2023-08-06 RX ORDER — POTASSIUM CHLORIDE 20 MEQ/1
40 TABLET, EXTENDED RELEASE ORAL
Status: COMPLETED | OUTPATIENT
Start: 2023-08-06 | End: 2023-08-06

## 2023-08-06 RX ADMIN — PIPERACILLIN AND TAZOBACTAM 3375 MG: 3; .375 INJECTION, POWDER, LYOPHILIZED, FOR SOLUTION INTRAVENOUS at 05:13

## 2023-08-06 RX ADMIN — ENOXAPARIN SODIUM 40 MG: 100 INJECTION SUBCUTANEOUS at 10:20

## 2023-08-06 RX ADMIN — POTASSIUM CHLORIDE 40 MEQ: 1500 TABLET, EXTENDED RELEASE ORAL at 18:19

## 2023-08-06 RX ADMIN — TIMOLOL MALEATE 1 DROP: 5 SOLUTION OPHTHALMIC at 19:58

## 2023-08-06 RX ADMIN — PIPERACILLIN AND TAZOBACTAM 3375 MG: 3; .375 INJECTION, POWDER, LYOPHILIZED, FOR SOLUTION INTRAVENOUS at 13:47

## 2023-08-06 RX ADMIN — PANTOPRAZOLE SODIUM 40 MG: 40 TABLET, DELAYED RELEASE ORAL at 05:09

## 2023-08-06 RX ADMIN — DOCUSATE SODIUM 200 MG: 100 CAPSULE, LIQUID FILLED ORAL at 10:20

## 2023-08-06 RX ADMIN — PIPERACILLIN AND TAZOBACTAM 3375 MG: 3; .375 INJECTION, POWDER, LYOPHILIZED, FOR SOLUTION INTRAVENOUS at 20:01

## 2023-08-06 RX ADMIN — DORZOLAMIDE HCL 1 DROP: 20 SOLUTION/ DROPS OPHTHALMIC at 19:58

## 2023-08-06 RX ADMIN — BENZONATATE 100 MG: 100 CAPSULE ORAL at 19:57

## 2023-08-06 RX ADMIN — PANTOPRAZOLE SODIUM 40 MG: 40 TABLET, DELAYED RELEASE ORAL at 16:58

## 2023-08-06 RX ADMIN — DORZOLAMIDE HCL 1 DROP: 20 SOLUTION/ DROPS OPHTHALMIC at 10:22

## 2023-08-06 RX ADMIN — FINASTERIDE 5 MG: 5 TABLET, FILM COATED ORAL at 10:20

## 2023-08-06 RX ADMIN — ESCITALOPRAM OXALATE 40 MG: 10 TABLET ORAL at 10:20

## 2023-08-06 RX ADMIN — SODIUM CHLORIDE, PRESERVATIVE FREE 10 ML: 5 INJECTION INTRAVENOUS at 10:21

## 2023-08-06 RX ADMIN — LATANOPROST 1 DROP: 50 SOLUTION OPHTHALMIC at 19:58

## 2023-08-06 RX ADMIN — POTASSIUM CHLORIDE 40 MEQ: 1500 TABLET, EXTENDED RELEASE ORAL at 16:58

## 2023-08-06 RX ADMIN — POLYETHYLENE GLYCOL 3350 17 G: 17 POWDER, FOR SOLUTION ORAL at 10:22

## 2023-08-06 RX ADMIN — FUROSEMIDE 20 MG: 10 INJECTION, SOLUTION INTRAMUSCULAR; INTRAVENOUS at 13:48

## 2023-08-06 RX ADMIN — TIMOLOL MALEATE 1 DROP: 5 SOLUTION OPHTHALMIC at 10:22

## 2023-08-06 RX ADMIN — TAMSULOSIN HYDROCHLORIDE 0.8 MG: 0.4 CAPSULE ORAL at 10:20

## 2023-08-06 RX ADMIN — ACETAMINOPHEN 650 MG: 325 TABLET ORAL at 19:57

## 2023-08-06 RX ADMIN — SODIUM CHLORIDE: 9 INJECTION, SOLUTION INTRAVENOUS at 19:59

## 2023-08-06 ASSESSMENT — PAIN - FUNCTIONAL ASSESSMENT: PAIN_FUNCTIONAL_ASSESSMENT: ACTIVITIES ARE NOT PREVENTED

## 2023-08-06 ASSESSMENT — PAIN DESCRIPTION - DESCRIPTORS: DESCRIPTORS: ACHING

## 2023-08-06 ASSESSMENT — PAIN SCALES - GENERAL: PAINLEVEL_OUTOF10: 3

## 2023-08-06 ASSESSMENT — PAIN DESCRIPTION - LOCATION: LOCATION: BACK

## 2023-08-06 ASSESSMENT — PAIN DESCRIPTION - ORIENTATION: ORIENTATION: MID

## 2023-08-06 NOTE — PROGRESS NOTES
[facility] Department   Phone: [phone]    Occupational Therapy    [x] Initial Evaluation            [] Daily Treatment Note         [] Discharge Summary      Patient: Jesus Rosario   : 1935   MRN: [id number]   Date of Service:  2023    Admitting Diagnosis:  UTI (urinary tract infection)  Current Admission Summary:   Jesus Rosario is a 81 y.o. male with history of HTN, dementia, GERD, glaucoma, BPH who came to ER from [address] [place] [address] with fall and dizziness. Patient has been constipated and has urinary frequency. Patient vomited twice. No CP, SOB, HA or abdominal pain. No fevers, chills or NS. No dysphagia, melena or hematochezia. Patient does not remember the fall. Otherwise complete ROS is negative unless listed above. Past Medical History:  has a past medical history of Acid reflux, Anxiety, Arthritis, BPH (benign prostatic hyperplasia), Constipation, Dementia ([address]), Esophagitis, Glaucoma, MRSA infection, and Neurocognitive disorder. Past Surgical History:  has a past surgical history that includes Knee cartilage surgery (Right) and hernia repair (Left). Discharge Recommendations: Jesus Rosario scored a  on the AM-PAC ADL Inpatient form. Current research shows that an AM-PAC score of 17 or less is typically not associated with a discharge to the patient's home setting. Based on the patient's AM-PAC score and their current ADL deficits, it is recommended that the patient have 3-5 sessions per week of Occupational Therapy at d/c to increase the patient's independence. Please see assessment section for further patient specific details. If patient discharges prior to next session this note will serve as a discharge summary. Please see below for the latest assessment towards goals.       DME Required For Discharge: DME to be determined pending patient progress    Precautions/Restrictions: high fall risk  Weight Bearing Restrictions: no Electronically Signed By: ELMIRA Benz/ISIAH [id number]

## 2023-08-06 NOTE — PROGRESS NOTES
[facility] Department   Phone: [phone]    Physical Therapy    [x] Initial Evaluation            [] Daily Treatment Note         [] Discharge Summary      Patient: Ginny Karimi   : 1935   MRN: [id number]   Date of Service:  2023  Admitting Diagnosis: UTI (urinary tract infection)    Current Admission Summary: Chief complaint:         Chief Complaint   Patient presents with    Nydia Springfield ems from [facility] d/t fall that happened last night. Pt states he does not remember falling. Pt alert and oriented during triage     Dizziness       Pt states he also endorses dizziness and overall weakness upon arrival to the ER. Also had 2 episodes of emesis PTA and upon arrival. Pt [facility]         History of Present Illness:  Ginny Karimi is a 81 y.o. male with history of HTN, dementia, GERD, glaucoma, BPH who came to ER from [address] [place] [address] with fall and dizziness. Patient has been constipated and has urinary frequency. Patient vomited twice. No CP, SOB, HA or abdominal pain. No fevers, chills or NS. No dysphagia, melena or hematochezia. Patient does not remember the fall. Otherwise complete ROS is negative unless listed above. Past Medical History:  has a past medical history of Acid reflux, Anxiety, Arthritis, BPH (benign prostatic hyperplasia), Constipation, Dementia ([address]), Esophagitis, Glaucoma, MRSA infection, and Neurocognitive disorder. Past Surgical History:  has a past surgical history that includes Knee cartilage surgery (Right) and hernia repair (Left). Discharge Recommendations: Ginny Karimi scored a 14/24 on the AM-PAC short mobility form. Current research shows that an AM-PAC score of 17 or less is typically not associated with a discharge to the patient's home setting.  Based on the patient's AM-PAC score and their current functional mobility deficits, it is recommended that the patient have 3-5 sessions per week of

## 2023-08-06 NOTE — PROGRESS NOTES
Facility/Department: [address]  Speech Language Pathology  DYSPHAGIA BEDSIDE SWALLOW EVALUATION     Patient: Humberto Gibson   : 1935   MRN: [id number]      Evaluation Date: 2023   Admitting Diagnosis: Dehydration [E86.0]  REYES (acute kidney injury) ([address]) [X85.6]  Complicated UTI (urinary tract infection) [N39.0]  Urinary tract infection without hematuria, site unspecified [N39.0]  Nausea and vomiting, unspecified vomiting type [R11.2]  Pain: Did not state                                                       H&P:    History of Present Illness:  Humberto Gibson is a 81 y.o. male with history of HTN, dementia, GERD, glaucoma, BPH who came to ER from [address] [place] [address] with fall and dizziness. Patient has been constipated and has urinary frequency. Patient vomited twice. No CP, SOB, HA or abdominal pain. No fevers, chills or NS. No dysphagia, melena or hematochezia. Patient does not remember the fall. Otherwise complete ROS is negative unless listed above. Imaging:  Chest X-ray:  2023  No radiographic evidence of acute pulmonary disease. Head CT:  2023    No acute intracranial abnormality. History/Prior Level of Function:   Living Status: Pt lives in long term facility and has a history of advanced dementia     Reason for referral: SLP evaluation orders received due to coughing with intake , poor diet tolerance , decline in medical status , and cognitive state     Dysphagia Impressions/Diagnosis: Oropharyngeal Dysphagia   Pt alert sitting upright in bed upon arrival. Pt denied pain. Pt oriented x1 only this date with observed confusion. Pt's oral mechanism assessed; pt with many missing teeth, mild lingual and labial ROM/strength, volitional swallow required drink to elicit (possibly due to cognitive deficits). Pt's swallow of thin liquid, puree texture, dysphagia III peaches in juice, and regular gram cracker assessed.  Pt with no overt s/s of aspiration

## 2023-08-07 LAB
ANION GAP SERPL CALCULATED.3IONS-SCNC: 9 MMOL/L (ref 3–16)
BASOPHILS # BLD: 0 K/UL (ref 0–0.2)
BASOPHILS NFR BLD: 0.4 %
BUN SERPL-MCNC: 35 MG/DL (ref 7–20)
CALCIUM SERPL-MCNC: 8.6 MG/DL (ref 8.3–10.6)
CHLORIDE SERPL-SCNC: 115 MMOL/L (ref 99–110)
CO2 SERPL-SCNC: 19 MMOL/L (ref 21–32)
CREAT SERPL-MCNC: 1.2 MG/DL (ref 0.8–1.3)
DEPRECATED RDW RBC AUTO: 14.4 % (ref 12.4–15.4)
EOSINOPHIL # BLD: 0.2 K/UL (ref 0–0.6)
EOSINOPHIL NFR BLD: 2.7 %
GFR SERPLBLD CREATININE-BSD FMLA CKD-EPI: 59 ML/MIN/{1.73_M2}
GLUCOSE SERPL-MCNC: 113 MG/DL (ref 70–99)
HCT VFR BLD AUTO: 32.2 % (ref 40.5–52.5)
HGB BLD-MCNC: 10.7 G/DL (ref 13.5–17.5)
LYMPHOCYTES # BLD: 1 K/UL (ref 1–5.1)
LYMPHOCYTES NFR BLD: 11.1 %
MCH RBC QN AUTO: 30.5 PG (ref 26–34)
MCHC RBC AUTO-ENTMCNC: 33.2 G/DL (ref 31–36)
MCV RBC AUTO: 91.8 FL (ref 80–100)
MONOCYTES # BLD: 0.4 K/UL (ref 0–1.3)
MONOCYTES NFR BLD: 4.8 %
NEUTROPHILS # BLD: 7 K/UL (ref 1.7–7.7)
NEUTROPHILS NFR BLD: 81 %
PLATELET # BLD AUTO: 150 K/UL (ref 135–450)
PMV BLD AUTO: 11.6 FL (ref 5–10.5)
POTASSIUM SERPL-SCNC: 3.7 MMOL/L (ref 3.5–5.1)
RBC # BLD AUTO: 3.51 M/UL (ref 4.2–5.9)
SODIUM SERPL-SCNC: 143 MMOL/L (ref 136–145)
WBC # BLD AUTO: 8.7 K/UL (ref 4–11)

## 2023-08-07 PROCEDURE — 6360000002 HC RX W HCPCS: Performed by: INTERNAL MEDICINE

## 2023-08-07 PROCEDURE — 2580000003 HC RX 258: Performed by: INTERNAL MEDICINE

## 2023-08-07 PROCEDURE — 92526 ORAL FUNCTION THERAPY: CPT

## 2023-08-07 PROCEDURE — 97116 GAIT TRAINING THERAPY: CPT

## 2023-08-07 PROCEDURE — 94760 N-INVAS EAR/PLS OXIMETRY 1: CPT

## 2023-08-07 PROCEDURE — 97530 THERAPEUTIC ACTIVITIES: CPT

## 2023-08-07 PROCEDURE — 51798 US URINE CAPACITY MEASURE: CPT

## 2023-08-07 PROCEDURE — 80048 BASIC METABOLIC PNL TOTAL CA: CPT

## 2023-08-07 PROCEDURE — 1200000000 HC SEMI PRIVATE

## 2023-08-07 PROCEDURE — 97110 THERAPEUTIC EXERCISES: CPT

## 2023-08-07 PROCEDURE — 85025 COMPLETE CBC W/AUTO DIFF WBC: CPT

## 2023-08-07 PROCEDURE — 6360000002 HC RX W HCPCS: Performed by: NURSE PRACTITIONER

## 2023-08-07 PROCEDURE — 36415 COLL VENOUS BLD VENIPUNCTURE: CPT

## 2023-08-07 PROCEDURE — 97535 SELF CARE MNGMENT TRAINING: CPT

## 2023-08-07 PROCEDURE — 6370000000 HC RX 637 (ALT 250 FOR IP): Performed by: INTERNAL MEDICINE

## 2023-08-07 RX ORDER — HYDRALAZINE HYDROCHLORIDE 20 MG/ML
10 INJECTION INTRAMUSCULAR; INTRAVENOUS EVERY 4 HOURS PRN
Status: DISCONTINUED | OUTPATIENT
Start: 2023-08-07 | End: 2023-08-15 | Stop reason: HOSPADM

## 2023-08-07 RX ORDER — LABETALOL HYDROCHLORIDE 5 MG/ML
10 INJECTION, SOLUTION INTRAVENOUS EVERY 4 HOURS PRN
Status: DISCONTINUED | OUTPATIENT
Start: 2023-08-07 | End: 2023-08-15 | Stop reason: HOSPADM

## 2023-08-07 RX ORDER — POLYETHYLENE GLYCOL 3350 17 G/17G
17 POWDER, FOR SOLUTION ORAL DAILY
Status: DISCONTINUED | OUTPATIENT
Start: 2023-08-07 | End: 2023-08-07

## 2023-08-07 RX ORDER — SENNA AND DOCUSATE SODIUM 50; 8.6 MG/1; MG/1
2 TABLET, FILM COATED ORAL DAILY PRN
Status: DISCONTINUED | OUTPATIENT
Start: 2023-08-07 | End: 2023-08-15 | Stop reason: HOSPADM

## 2023-08-07 RX ADMIN — TIMOLOL MALEATE 1 DROP: 5 SOLUTION OPHTHALMIC at 21:29

## 2023-08-07 RX ADMIN — TAMSULOSIN HYDROCHLORIDE 0.8 MG: 0.4 CAPSULE ORAL at 08:35

## 2023-08-07 RX ADMIN — DORZOLAMIDE HCL 1 DROP: 20 SOLUTION/ DROPS OPHTHALMIC at 21:29

## 2023-08-07 RX ADMIN — SODIUM CHLORIDE, PRESERVATIVE FREE 10 ML: 5 INJECTION INTRAVENOUS at 21:29

## 2023-08-07 RX ADMIN — ESCITALOPRAM OXALATE 40 MG: 10 TABLET ORAL at 08:35

## 2023-08-07 RX ADMIN — ENOXAPARIN SODIUM 40 MG: 100 INJECTION SUBCUTANEOUS at 08:36

## 2023-08-07 RX ADMIN — SODIUM CHLORIDE, PRESERVATIVE FREE 10 ML: 5 INJECTION INTRAVENOUS at 08:36

## 2023-08-07 RX ADMIN — ONDANSETRON 4 MG: 2 INJECTION INTRAMUSCULAR; INTRAVENOUS at 11:18

## 2023-08-07 RX ADMIN — PIPERACILLIN AND TAZOBACTAM 3375 MG: 3; .375 INJECTION, POWDER, LYOPHILIZED, FOR SOLUTION INTRAVENOUS at 14:58

## 2023-08-07 RX ADMIN — DOCUSATE SODIUM 200 MG: 100 CAPSULE, LIQUID FILLED ORAL at 08:35

## 2023-08-07 RX ADMIN — LATANOPROST 1 DROP: 50 SOLUTION OPHTHALMIC at 21:29

## 2023-08-07 RX ADMIN — PANTOPRAZOLE SODIUM 40 MG: 40 TABLET, DELAYED RELEASE ORAL at 16:42

## 2023-08-07 RX ADMIN — PIPERACILLIN AND TAZOBACTAM 3375 MG: 3; .375 INJECTION, POWDER, LYOPHILIZED, FOR SOLUTION INTRAVENOUS at 04:30

## 2023-08-07 RX ADMIN — FINASTERIDE 5 MG: 5 TABLET, FILM COATED ORAL at 08:35

## 2023-08-07 RX ADMIN — HYDRALAZINE HYDROCHLORIDE 10 MG: 20 INJECTION INTRAMUSCULAR; INTRAVENOUS at 22:41

## 2023-08-07 RX ADMIN — PANTOPRAZOLE SODIUM 40 MG: 40 TABLET, DELAYED RELEASE ORAL at 05:20

## 2023-08-07 RX ADMIN — POLYETHYLENE GLYCOL 3350 17 G: 17 POWDER, FOR SOLUTION ORAL at 08:35

## 2023-08-07 RX ADMIN — TIMOLOL MALEATE 1 DROP: 5 SOLUTION OPHTHALMIC at 08:36

## 2023-08-07 RX ADMIN — PIPERACILLIN AND TAZOBACTAM 3375 MG: 3; .375 INJECTION, POWDER, LYOPHILIZED, FOR SOLUTION INTRAVENOUS at 21:27

## 2023-08-07 RX ADMIN — DORZOLAMIDE HCL 1 DROP: 20 SOLUTION/ DROPS OPHTHALMIC at 08:36

## 2023-08-07 NOTE — PROGRESS NOTES
[facility] Department   Phone: [phone]    Physical Therapy    [] Initial Evaluation            [x] Daily Treatment Note         [] Discharge Summary      Patient: Dionna Sevilla   : 1935   MRN: [id number]   Date of Service:  2023  Admitting Diagnosis: UTI (urinary tract infection)    Current Admission Summary: Chief complaint:         Chief Complaint   Patient presents with    Sara Wallis ems from [facility] d/t fall that happened last night. Pt states he does not remember falling. Pt alert and oriented during triage     Dizziness       Pt states he also endorses dizziness and overall weakness upon arrival to the ER. Also had 2 episodes of emesis PTA and upon arrival. Pt [facility]         History of Present Illness:  Dionna Sevilla is a 81 y.o. male with history of HTN, dementia, GERD, glaucoma, BPH who came to ER from [address] [place] [address] with fall and dizziness. Patient has been constipated and has urinary frequency. Patient vomited twice. No CP, SOB, HA or abdominal pain. No fevers, chills or NS. No dysphagia, melena or hematochezia. Patient does not remember the fall. Otherwise complete ROS is negative unless listed above. Past Medical History:  has a past medical history of Acid reflux, Anxiety, Arthritis, BPH (benign prostatic hyperplasia), Constipation, Dementia ([address]), Esophagitis, Glaucoma, MRSA infection, and Neurocognitive disorder. Past Surgical History:  has a past surgical history that includes Knee cartilage surgery (Right) and hernia repair (Left). Discharge Recommendations: Dionna Sevilla scored a 14/24 on the AM-PAC short mobility form. Current research shows that an AM-PAC score of 17 or less is typically not associated with a discharge to the patient's home setting.  Based on the patient's AM-PAC score and their current functional mobility deficits, it is recommended that the patient have 3-5 sessions per week of

## 2023-08-07 NOTE — PROGRESS NOTES
Daily Living  No IADL completed on this date. Functional Mobility  Bed Mobility:  Supine to Sit: stand by assistance  Scooting: stand by assistance  Comments: HOB elevated with use of bed rail and increased time  Transfers:  Sit to stand transfer:contact guard assistance, minimal assistance, moderate assistance  Stand to sit transfer: moderate assistance  Comments:Fluctuating assistance levels depending on seat height and fatigue  Functional Mobility  Functional Mobility Activity: to/from bathroom, 10 ft +15 ft + 10 ft  Device Use: rolling walker  Required Assistance: contact guard assistance, minimal assistance, moderate assistance  Comment: Fluctuating assistance levels, fatigued quickly    Balance:  Static Sitting Balance: fair (+): maintains balance at SBA/supervision without use of UE support  Dynamic Sitting Balance: fair (+): maintains balance at SBA/supervision without use of UE support  Static Standing Balance: fair (-): maintains balance at CGA with use of UE support  Dynamic Standing Balance: poor (-): requires max (A) to maintain balance  Comments: standing at the sink for brief periods with BSC commode behind pt and rest breaks  Other Therapeutic Interventions  While seated in recliner chair, pt completed one set of 10x UB strengthening and endurance training: alternating punches at chest, upper cut punches with an emphasis on neck flexion, arm circles and shoulder shrugs. Limited scapular elevation noted. Additionally, pt completed LE exercises with PT while seated in recliner chair Please refer to PT note.    Functional Outcomes  -PAC Inpatient Daily Activity Raw Score: 12    Cognition  Overall Cognitive Status: Impaired  Arousal/Alertness: delayed responses to stimuli  Following Commands: follows one step commands with repetition, follows one step commands with increased time  Attention Span: attends with cues to redirect, difficulty attending to directions, difficulty dividing

## 2023-08-07 NOTE — CARE COORDINATION
Discharge Planning:     (CM) spoke with Patient who advised he doesn't want to go to the SNF side of [facility] but will if he has too. CM called Saritha in admissions and LVM asking for a phone call back. CM team to follow.      Electronically signed by JOSUÉ Obregon on 8/7/2023 at 4:37 PM

## 2023-08-07 NOTE — PLAN OF CARE
Problem: Discharge Planning  Goal: Discharge to home or other facility with appropriate resources  Outcome: Progressing     Problem: Skin/Tissue Integrity  Goal: Absence of new skin breakdown  Description: 1. Monitor for areas of redness and/or skin breakdown  2. Assess vascular access sites hourly  3. Every 4-6 hours minimum:  Change oxygen saturation probe site  4. Every 4-6 hours:  If on nasal continuous positive airway pressure, respiratory therapy assess nares and determine need for appliance change or resting period.   Outcome: Progressing     Problem: Safety - Adult  Goal: Free from fall injury  Outcome: Progressing     Problem: ABCDS Injury Assessment  Goal: Absence of physical injury  Outcome: Progressing     Problem: Neurosensory - Adult  Goal: Achieves stable or improved neurological status  Outcome: Progressing  Goal: Absence of seizures  Outcome: Progressing  Goal: Remains free of injury related to seizures activity  Outcome: Progressing     Problem: Respiratory - Adult  Goal: Achieves optimal ventilation and oxygenation  Outcome: Progressing     Problem: Skin/Tissue Integrity - Adult  Goal: Skin integrity remains intact  Outcome: Progressing  Goal: Oral mucous membranes remain intact  Outcome: Progressing     Problem: Musculoskeletal - Adult  Goal: Return mobility to safest level of function  Outcome: Progressing  Goal: Return ADL status to a safe level of function  Outcome: Progressing     Problem: Genitourinary - Adult  Goal: Absence of urinary retention  Outcome: Progressing

## 2023-08-07 NOTE — PROGRESS NOTES
[address] [facility]    8/7/2023 7:56 AM        Name: Dominga Porter Admitted: 8/5/2023  Primary Care Provider: Guillaume Atkins MD (Tel: [phone])      Subjective:  . Lives at [facility],  [facility]  Admitted with fall/ dizziness    Noted to have possible UTI/ Aspiration PNA  And fecal impaction. Seen this am with RN at bedside. Had 2 stools yesterday following MOM enema    No current reports of pain.   Had chronic generalized weakness      Reviewed interval ancillary notes    Current Medications  benzocaine-menthol (CEPACOL SORE THROAT) lozenge 1 lozenge, Q2H PRN  aluminum & magnesium hydroxide-simethicone (MAALOX) 200-200-20 MG/5ML suspension 30 mL, Q6H PRN  benzonatate (TESSALON) capsule 100 mg, TID PRN  bisacodyl (DULCOLAX) EC tablet 5 mg, Daily PRN  cyclobenzaprine (FLEXERIL) tablet 5 mg, TID PRN  docusate sodium (COLACE) capsule 200 mg, Daily  escitalopram (LEXAPRO) tablet 40 mg, Daily  finasteride (PROSCAR) tablet 5 mg, Daily  latanoprost (XALATAN) 0.005 % ophthalmic solution 1 drop, Nightly  melatonin tablet 3 mg, Nightly PRN  pantoprazole (PROTONIX) tablet 40 mg, BID AC  polyethylene glycol (GLYCOLAX) packet 17 g, Daily  polyvinyl alcohol (LIQUIFILM TEARS) 1.4 % ophthalmic solution 1 drop, PRN  tamsulosin (FLOMAX) capsule 0.8 mg, Daily  sodium chloride flush 0.9 % injection 5-40 mL, 2 times per day  sodium chloride flush 0.9 % injection 5-40 mL, PRN  0.9 % sodium chloride infusion, PRN  enoxaparin (LOVENOX) injection 40 mg, Daily  ondansetron (ZOFRAN-ODT) disintegrating tablet 4 mg, Q8H PRN   Or  ondansetron (ZOFRAN) injection 4 mg, Q6H PRN  polyethylene glycol (GLYCOLAX) packet 17 g, Daily PRN  acetaminophen (TYLENOL) tablet 650 mg, Q6H PRN   Or  acetaminophen (TYLENOL) suppository 650 mg, Q6H PRN  0.9 % sodium chloride infusion, Continuous  piperacillin-tazobactam (ZOSYN) 3,375 mg in sodium chloride 0.9 % 50 mL IVPB (mini-bag), Q8H  dorzolamide

## 2023-08-07 NOTE — CARE COORDINATION
Discharge Planning:     (CM) called [facility] danyelle Harry Rowan [phone] and LVM asking for a phone call back in regards to one of her residents.  was not secure so CM did not leave any patient information.        Electronically signed by JOSUÉ Barbosa on 8/7/2023 at 8:45 AM

## 2023-08-07 NOTE — PROGRESS NOTES
Facility/Department: [address]  Speech Language Pathology   Dysphagia Treatment Note    Patient: Mary Bean   : 1935   MRN: [id number]      Evaluation Date: 2023      Admitting Dx: Dehydration [E86.0]  REYES (acute kidney injury) ([address]) [N23.2]  Complicated UTI (urinary tract infection) [N39.0]  Urinary tract infection without hematuria, site unspecified [N39.0]  Nausea and vomiting, unspecified vomiting type [R11.2]  Treatment Diagnosis: Oropharyngeal Dysphagia   Pain: Denies                                              Diet and Treatment Recommendations 2023:  Diet Solids Recommendation:  Easy to chew  Liquid Consistency Recommendation: Thin liquids  Recommended form of Meds: Meds whole with water or Meds in puree         Compensatory strategies: Alternate solids/liquids , Upright as possible with all PO intake , Small bites/sips , Eat/feed slowly, Remain upright 30-45 min     Assessment of Texture Tolerance:  Diet level prior to treatment: Dysphagia III Soft and bite sized , Thin liquids   Tolerance of Current Diet Level:RN reported pt appears to be tolerating current diet level     Impressions: Pt was positioned Upright in bed , awake and alert. Currently on room air. Trials of thin liquids, soft solids, and regular solids  were provided to assess swallow function. Pt able to self feed given set up assist. Oral phase characterized by prolonged mastication, decreased AP transit, reduced bolus cohesion and suspected premature bolus loss to pharynx. Pt was able to achieve effective oral clearing of soft solids but required use of a liquid wash with regular solids. No overt clinical s/s of aspiration were assessed with thin liquids. Pt continues to benefit from softer selections secondary to dentition however pt requested soup be allowed as he has been nauseated and limited options sound appetizing.  Based on today's assessment recommend upgrade Easy to chew  with Thin liquids , Meds whole with water or Meds in puree . Dysphagia Goals:   Pt will functionally tolerate recommended diet with no overt clinical s/s of aspiration (Ongoing 08/07/23)  Pt will functionally tolerate ongoing assessment of swallow function with diet to be determined as indicated (Ongoing 08/07/23)  Pt will demonstrate understanding of aspiration risk and precautions via education/demonstration with occasional prompting (Ongoing 08/07/23)  Pt will advance to least restrictive diet as indicated (Ongoing 08/07/23)    Plan:   1-2 times to ensure diet tolerance. Patient/Family Education:  Provided education regarding role of SLP, recommendations and general speech pathology plan of care. [x] Pt verbalized understanding and agreement   [x] Pt requires ongoing learning   [] No evidence of comprehension     Discharge Recommendations:    Discharge recommendations to be determined pending ongoing follow-up during acute care stay    Treatment time:  Timed Code Treatment Minutes: 0  Total Treatment time: 16 min    If patient discharges prior to next session this note will serve as a discharge summary.      Signature:   Taylor Bahena M.A., [address]  Speech-Language Pathologist

## 2023-08-08 ENCOUNTER — APPOINTMENT (OUTPATIENT)
Dept: CT IMAGING | Age: 88
DRG: 987 | End: 2023-08-08
Payer: MEDICARE

## 2023-08-08 LAB
ANION GAP SERPL CALCULATED.3IONS-SCNC: 8 MMOL/L (ref 3–16)
BASOPHILS # BLD: 0 K/UL (ref 0–0.2)
BASOPHILS NFR BLD: 0.5 %
BUN SERPL-MCNC: 28 MG/DL (ref 7–20)
CALCIUM SERPL-MCNC: 9.1 MG/DL (ref 8.3–10.6)
CHLORIDE SERPL-SCNC: 114 MMOL/L (ref 99–110)
CO2 SERPL-SCNC: 23 MMOL/L (ref 21–32)
CREAT SERPL-MCNC: 1.1 MG/DL (ref 0.8–1.3)
DEPRECATED RDW RBC AUTO: 14.6 % (ref 12.4–15.4)
EOSINOPHIL # BLD: 0.1 K/UL (ref 0–0.6)
EOSINOPHIL NFR BLD: 1.7 %
GFR SERPLBLD CREATININE-BSD FMLA CKD-EPI: >60 ML/MIN/{1.73_M2}
GLUCOSE SERPL-MCNC: 115 MG/DL (ref 70–99)
HCT VFR BLD AUTO: 33.5 % (ref 40.5–52.5)
HGB BLD-MCNC: 10.9 G/DL (ref 13.5–17.5)
LYMPHOCYTES # BLD: 0.8 K/UL (ref 1–5.1)
LYMPHOCYTES NFR BLD: 10.3 %
MCH RBC QN AUTO: 30.3 PG (ref 26–34)
MCHC RBC AUTO-ENTMCNC: 32.6 G/DL (ref 31–36)
MCV RBC AUTO: 93.1 FL (ref 80–100)
MONOCYTES # BLD: 0.5 K/UL (ref 0–1.3)
MONOCYTES NFR BLD: 6.6 %
NEUTROPHILS # BLD: 6.1 K/UL (ref 1.7–7.7)
NEUTROPHILS NFR BLD: 80.9 %
PLATELET # BLD AUTO: 157 K/UL (ref 135–450)
PMV BLD AUTO: 11.8 FL (ref 5–10.5)
POTASSIUM SERPL-SCNC: 3.6 MMOL/L (ref 3.5–5.1)
RBC # BLD AUTO: 3.6 M/UL (ref 4.2–5.9)
SODIUM SERPL-SCNC: 145 MMOL/L (ref 136–145)
WBC # BLD AUTO: 7.6 K/UL (ref 4–11)

## 2023-08-08 PROCEDURE — 51798 US URINE CAPACITY MEASURE: CPT

## 2023-08-08 PROCEDURE — 6360000002 HC RX W HCPCS: Performed by: NURSE PRACTITIONER

## 2023-08-08 PROCEDURE — 80048 BASIC METABOLIC PNL TOTAL CA: CPT

## 2023-08-08 PROCEDURE — 36415 COLL VENOUS BLD VENIPUNCTURE: CPT

## 2023-08-08 PROCEDURE — 71250 CT THORAX DX C-: CPT

## 2023-08-08 PROCEDURE — 1200000000 HC SEMI PRIVATE

## 2023-08-08 PROCEDURE — 85025 COMPLETE CBC W/AUTO DIFF WBC: CPT

## 2023-08-08 PROCEDURE — 6370000000 HC RX 637 (ALT 250 FOR IP): Performed by: INTERNAL MEDICINE

## 2023-08-08 PROCEDURE — 6360000002 HC RX W HCPCS: Performed by: INTERNAL MEDICINE

## 2023-08-08 PROCEDURE — 51702 INSERT TEMP BLADDER CATH: CPT

## 2023-08-08 PROCEDURE — 2580000003 HC RX 258: Performed by: INTERNAL MEDICINE

## 2023-08-08 RX ADMIN — PIPERACILLIN AND TAZOBACTAM 3375 MG: 3; .375 INJECTION, POWDER, LYOPHILIZED, FOR SOLUTION INTRAVENOUS at 03:51

## 2023-08-08 RX ADMIN — SODIUM CHLORIDE, PRESERVATIVE FREE 10 ML: 5 INJECTION INTRAVENOUS at 20:43

## 2023-08-08 RX ADMIN — ACETAMINOPHEN 650 MG: 325 TABLET ORAL at 20:43

## 2023-08-08 RX ADMIN — SODIUM CHLORIDE, PRESERVATIVE FREE 10 ML: 5 INJECTION INTRAVENOUS at 11:54

## 2023-08-08 RX ADMIN — TIMOLOL MALEATE 1 DROP: 5 SOLUTION OPHTHALMIC at 12:19

## 2023-08-08 RX ADMIN — PIPERACILLIN AND TAZOBACTAM 3375 MG: 3; .375 INJECTION, POWDER, LYOPHILIZED, FOR SOLUTION INTRAVENOUS at 14:09

## 2023-08-08 RX ADMIN — HYDRALAZINE HYDROCHLORIDE 10 MG: 20 INJECTION INTRAMUSCULAR; INTRAVENOUS at 20:42

## 2023-08-08 RX ADMIN — ESCITALOPRAM OXALATE 40 MG: 10 TABLET ORAL at 11:49

## 2023-08-08 RX ADMIN — LATANOPROST 1 DROP: 50 SOLUTION OPHTHALMIC at 21:18

## 2023-08-08 RX ADMIN — PANTOPRAZOLE SODIUM 40 MG: 40 TABLET, DELAYED RELEASE ORAL at 05:10

## 2023-08-08 RX ADMIN — TIMOLOL MALEATE 1 DROP: 5 SOLUTION OPHTHALMIC at 21:18

## 2023-08-08 RX ADMIN — DORZOLAMIDE HCL 1 DROP: 20 SOLUTION/ DROPS OPHTHALMIC at 21:17

## 2023-08-08 RX ADMIN — FINASTERIDE 5 MG: 5 TABLET, FILM COATED ORAL at 11:49

## 2023-08-08 RX ADMIN — ACETAMINOPHEN 650 MG: 325 TABLET ORAL at 00:11

## 2023-08-08 RX ADMIN — TAMSULOSIN HYDROCHLORIDE 0.8 MG: 0.4 CAPSULE ORAL at 11:52

## 2023-08-08 RX ADMIN — PIPERACILLIN AND TAZOBACTAM 3375 MG: 3; .375 INJECTION, POWDER, LYOPHILIZED, FOR SOLUTION INTRAVENOUS at 21:34

## 2023-08-08 RX ADMIN — ENOXAPARIN SODIUM 40 MG: 100 INJECTION SUBCUTANEOUS at 11:48

## 2023-08-08 RX ADMIN — MELATONIN TAB 3 MG 3 MG: 3 TAB at 20:43

## 2023-08-08 RX ADMIN — DORZOLAMIDE HCL 1 DROP: 20 SOLUTION/ DROPS OPHTHALMIC at 12:18

## 2023-08-08 RX ADMIN — PANTOPRAZOLE SODIUM 40 MG: 40 TABLET, DELAYED RELEASE ORAL at 20:43

## 2023-08-08 RX ADMIN — POLYETHYLENE GLYCOL 3350 17 G: 17 POWDER, FOR SOLUTION ORAL at 11:54

## 2023-08-08 ASSESSMENT — PAIN SCALES - GENERAL
PAINLEVEL_OUTOF10: 0
PAINLEVEL_OUTOF10: 10
PAINLEVEL_OUTOF10: 3
PAINLEVEL_OUTOF10: 0
PAINLEVEL_OUTOF10: 0

## 2023-08-08 ASSESSMENT — PAIN DESCRIPTION - LOCATION: LOCATION: KNEE

## 2023-08-08 ASSESSMENT — PAIN DESCRIPTION - ORIENTATION: ORIENTATION: RIGHT

## 2023-08-08 NOTE — PLAN OF CARE
Problem: Discharge Planning  Goal: Discharge to home or other facility with appropriate resources  Outcome: Progressing  Flowsheets (Taken 8/7/2023 2030)  Discharge to home or other facility with appropriate resources: Identify barriers to discharge with patient and caregiver     Problem: Skin/Tissue Integrity  Goal: Absence of new skin breakdown  Description: 1. Monitor for areas of redness and/or skin breakdown  2. Assess vascular access sites hourly  3. Every 4-6 hours minimum:  Change oxygen saturation probe site  4. Every 4-6 hours:  If on nasal continuous positive airway pressure, respiratory therapy assess nares and determine need for appliance change or resting period. Outcome: Progressing     Problem: Safety - Adult  Goal: Free from fall injury  Outcome: Progressing     Problem: ABCDS Injury Assessment  Goal: Absence of physical injury  Outcome: Progressing     Problem: Neurosensory - Adult  Goal: Achieves stable or improved neurological status  Outcome: Progressing  Flowsheets (Taken 8/7/2023 2030)  Achieves stable or improved neurological status: Assess for and report changes in neurological status  Goal: Absence of seizures  Outcome: Progressing  Flowsheets (Taken 8/7/2023 2030)  Absence of seizures: Monitor for seizure activity. If seizure occurs, document type and location of movements and any associated apnea  Goal: Remains free of injury related to seizures activity  Outcome: Progressing  Flowsheets (Taken 8/7/2023 2030)  Remains free of injury related to seizure activity: Monitor patient for seizure activity, document and report duration and description of seizure to Licensed Independent Practitioner     Problem: Neurosensory - Adult  Goal: Absence of seizures  Outcome: Progressing  Flowsheets (Taken 8/7/2023 2030)  Absence of seizures: Monitor for seizure activity.   If seizure occurs, document type and location of movements and any associated apnea     Problem: Respiratory - Adult  Goal:

## 2023-08-08 NOTE — PROGRESS NOTES
Pt arrived to unit, A&OX 4, calm, very pleasant, oriented to room and use of call light. /77 map 109, HR 65. Only c/o \"some chest congestion\" and productive cough. Slight rhonchi noted bilaterally. spo2 94-95% on RA. Spo2 94-95% on RA. Dimitri Chung notified via perfect serve; see N.O. for prn hydralazine and labetalol. Pt able to void 150ml clear, yellow urine per urinal. Post void bladder scan performed per order revealing 365ml. Will cont to encourage voiding and re-scan bladder per order.

## 2023-08-08 NOTE — CARE COORDINATION
Called [phone] (Cell) and [phone] Domitila Padilla)  at [place] to check patients status. Left VM and requested a call back. She left a  to confirm patient is a resident in [address] [place] [address]. Requested another call back to check on pre-cert and Covid test.    CM to confirm.     Electronically signed by Louis Kauffman RN on 8/8/2023 at 9:47 AM

## 2023-08-08 NOTE — PROGRESS NOTES
Facility/Department: [address]  Speech Language Pathology   Attempt    Patient: Ibeth Marks   : 1935   MRN: [id number]      Evaluation Date: 2023      Admitting Dx: Dehydration [E86.0]  REYES (acute kidney injury) ([address]) [F72.0]  Complicated UTI (urinary tract infection) [N39.0]  Urinary tract infection without hematuria, site unspecified [N39.0]  Nausea and vomiting, unspecified vomiting type [R11.2]  Treatment Diagnosis: Oropharyngeal Dysphagia       Attempted to see pt for follow up dysphagia therapy. Per chart review, diet was downgraded earlier this date per his preference and difficulty swallowing solid foods. Pt leaving the floor for testing. Will follow up as schedule permits and pt is able to participate. Consuelo Russell M.A.  CCC-SLP KIP [id number]  Speech-Language Pathologist   2023 2:56 PM

## 2023-08-08 NOTE — PROGRESS NOTES
Bladder scan performed per order revealing 648mL. Pt unable to void, c/o bladder discomfort. St cath attempted using 16fr but failed d/t resistance and pt discomfort. 16fr coude cath placed using sterile technique; pt tolerated well. 475mL hazy, yellow urine drained. Will cont to monitor.

## 2023-08-08 NOTE — CONSULTS
Gastroenterology Consult Note        Patient: Stephanie García  : 1935  Acct#:      Date:  2023    Subjective:       History of Present Illness  Patient is a 81 y.o.  male admitted with Dehydration [E86.0]  REYES (acute kidney injury) ([address]) [V04.4]  Complicated UTI (urinary tract infection) [N39.0]  Urinary tract infection without hematuria, site unspecified [N39.0]  Nausea and vomiting, unspecified vomiting type [R11.2] who is seen in consult for Dysphagia. Presented to the ED with recurrent falls and nausea. Found to have pneumonia and UTI. He also had nausea on admission. CT showed fecal impaction, this was resolved with enema. Was not taking anything daily for constipation. He has had issues in the past with dysphagia, which he reports were significantly improved with prior dilation. Followed with Dr. Naeem Meng before he retired. He reports lately feeling food getting stuck in the back of his throat and in his chest. This has been going on for a few weeks prior to admission. In addition to this he has nausea which has decreased his appetite significantly. Past Medical History:   Diagnosis Date    Acid reflux     Anxiety     Arthritis     BPH (benign prostatic hyperplasia)     Constipation     Dementia (HCC)     Esophagitis     Glaucoma     MRSA infection 16    urine    Neurocognitive disorder       Past Surgical History:   Procedure Laterality Date    HERNIA REPAIR Left     inguinal    KNEE CARTILAGE SURGERY Right       Past Endoscopic History  EGD and colonoscopy with Dr. Deidre Clinton  Findings:   1. Esophagus: Mucosa  was normal. GEJ was normal at 40 cm. Mild   distal esophagitis seen. Few whitish plaques seen in the  distal   esophagus, brushings were obtained to rule out Candida   2. Stomach:    Mucosa in fundus was normal;  Mucosa in the body   was  normal;  In the Antrum mild erythema seen; Biopsy obtained   from Antrum r/o H pylori.    3. Duodenum:  First , Second and Third

## 2023-08-08 NOTE — CARE COORDINATION
Case Management Assessment  Initial Evaluation    Date/Time of Evaluation: 8/8/2023 3:04 PM  Assessment Completed by: Lisa Matos RN    If patient is discharged prior to next notation, then this note serves as note for discharge by case management. Patient Name: Suzie Castillo                   YOB: 1935  Diagnosis: Dehydration [E86.0]  REYES (acute kidney injury) ([address]) [B98.6]  Complicated UTI (urinary tract infection) [N39.0]  Urinary tract infection without hematuria, site unspecified [N39.0]  Nausea and vomiting, unspecified vomiting type [R11.2]                   Date / Time: 8/5/2023 10:53 AM    Patient Admission Status: Inpatient     Current PCP: Mac Feliciano MD  PCP verified by CM? Yes    Chart Reviewed: Yes      Patient Interviewed: Yes   Family Interviewed:  No   Patient Orientation: Alert and Oriented    Patient Cognition: Alert  History Provided by: Patient    Hospitalization in the last 30 days (Readmission):  No    If yes, Readmission Assessment in CM Navigator will be completed. Advance Directives:     Code Status: Limited     Primary Decision Maker: Mike Garcia - Child - [phone]    Discharge Planning  Patient lives with: Alone Type of Home: Assisted living   Primary Caregiver: Self  Patient Support Systems include: Family Members   Current Financial resources: Medicaid, Medicare  Current community resources: Assisted Living  Current services prior to admission: Other (Comment) (ASL at [facility])   Type of Home Care services:  None    ADLS  Prior functional level:  Independent in ADLs/IADLs, Assistance with the following:, Mobility, Housework, Cooking, Shopping (driving)  Current functional level: Assistance with the following:, Bathing, Dressing, Toileting, Cooking, Housework, Shopping, Mobility    PT AM-PAC: 14 /24  OT AM-PAC: 12 /24    Family can provide assistance at DC: No  Would you like Case Management to discuss the discharge plan with any other would start pre-cert, however the facility did not anticipate any discharges this week. She stated pre-cert delay would not prevent admission. [address]: [facility]  [address] [place], [place] [address]y 6  Phone: [phone]  Fax: [phone] with [facility] ([phone]) facility liason and she stated she would start the precert, She stated pre-cert delay would not prevent admission. Pt stated he could not make a third choice so deferred that to his daughter and Ca Pichardo [phone]. Called Romy and she stated she would look at options and call the CM back at [phone]. CM/SW will follow-up on referrals and provide any additional documentation necessary to facilitate placement. The Plan for Transition of Care is related to the following treatment goals of Dehydration [E86.0]  REYES (acute kidney injury) ([address]) [Q59.0]  Complicated UTI (urinary tract infection) [N39.0]  Urinary tract infection without hematuria, site unspecified [N39.0]  Nausea and vomiting, unspecified vomiting type [M10.5]    IF APPLICABLE: The Patient and/or patient representative Elvis Lover and his family were provided with a choice of provider and agrees with the discharge plan. Freedom of choice list with basic dialogue that supports the patient's individualized plan of care/goals and shares the quality data associated with the providers was provided to: Patient and  Representative Name:   Ca Pichardo [phone].      The Patient and/or Patient Representative Agree with the Discharge Plan?  yes    Barbara Mendez RN  Case Management Department  Ph: [phone]    Electronically signed by Barbara Mendez RN on 8/8/2023 at 3:22 PM

## 2023-08-08 NOTE — PLAN OF CARE
Problem: Discharge Planning  Goal: Discharge to home or other facility with appropriate resources  Outcome: Progressing     Problem: Skin/Tissue Integrity  Goal: Absence of new skin breakdown  Description: 1.   Monitor for areas of redness and/or skin breakdown  Outcome: Progressing     Problem: Safety - Adult  Goal: Free from fall injury  Outcome: Progressing     Problem: ABCDS Injury Assessment  Goal: Absence of physical injury  Outcome: Progressing     Problem: Neurosensory - Adult  Goal: Achieves stable or improved neurological status  Outcome: Progressing     Problem: Neurosensory - Adult  Goal: Absence of seizures  Outcome: Progressing     Problem: Neurosensory - Adult  Goal: Remains free of injury related to seizures activity  Outcome: Progressing     Problem: Respiratory - Adult  Goal: Achieves optimal ventilation and oxygenation  Outcome: Progressing     Problem: Skin/Tissue Integrity - Adult  Goal: Skin integrity remains intact  Outcome: Progressing     Problem: Skin/Tissue Integrity - Adult  Goal: Oral mucous membranes remain intact  Outcome: Progressing     Problem: Musculoskeletal - Adult  Goal: Return mobility to safest level of function  Outcome: Progressing     Problem: Musculoskeletal - Adult  Goal: Return ADL status to a safe level of function  Outcome: Progressing     Problem: Genitourinary - Adult  Goal: Absence of urinary retention  Outcome: Progressing

## 2023-08-09 ENCOUNTER — ANESTHESIA (OUTPATIENT)
Dept: ENDOSCOPY | Age: 88
End: 2023-08-09
Payer: MEDICARE

## 2023-08-09 ENCOUNTER — ANESTHESIA EVENT (OUTPATIENT)
Dept: ENDOSCOPY | Age: 88
End: 2023-08-09
Payer: MEDICARE

## 2023-08-09 LAB
ALBUMIN SERPL-MCNC: 3.2 G/DL (ref 3.4–5)
ALP SERPL-CCNC: 86 U/L (ref 40–129)
ALT SERPL-CCNC: 18 U/L (ref 10–40)
ANION GAP SERPL CALCULATED.3IONS-SCNC: 12 MMOL/L (ref 3–16)
AST SERPL-CCNC: 18 U/L (ref 15–37)
BILIRUB DIRECT SERPL-MCNC: <0.2 MG/DL (ref 0–0.3)
BILIRUB INDIRECT SERPL-MCNC: ABNORMAL MG/DL (ref 0–1)
BILIRUB SERPL-MCNC: 0.5 MG/DL (ref 0–1)
BUN SERPL-MCNC: 25 MG/DL (ref 7–20)
CALCIUM SERPL-MCNC: 9.4 MG/DL (ref 8.3–10.6)
CHLORIDE SERPL-SCNC: 115 MMOL/L (ref 99–110)
CO2 SERPL-SCNC: 21 MMOL/L (ref 21–32)
CREAT SERPL-MCNC: 0.9 MG/DL (ref 0.8–1.3)
GFR SERPLBLD CREATININE-BSD FMLA CKD-EPI: >60 ML/MIN/{1.73_M2}
GLUCOSE SERPL-MCNC: 141 MG/DL (ref 70–99)
LV EF: 40 %
LVEF MODALITY: NORMAL
MAGNESIUM SERPL-MCNC: 2.3 MG/DL (ref 1.8–2.4)
NT-PROBNP SERPL-MCNC: 5527 PG/ML (ref 0–449)
POTASSIUM SERPL-SCNC: 3.4 MMOL/L (ref 3.5–5.1)
PROT SERPL-MCNC: 6.5 G/DL (ref 6.4–8.2)
SODIUM SERPL-SCNC: 148 MMOL/L (ref 136–145)

## 2023-08-09 PROCEDURE — 6370000000 HC RX 637 (ALT 250 FOR IP): Performed by: INTERNAL MEDICINE

## 2023-08-09 PROCEDURE — 3609015300 HC ESOPHAGEAL DILATION MALONEY: Performed by: INTERNAL MEDICINE

## 2023-08-09 PROCEDURE — 7100000001 HC PACU RECOVERY - ADDTL 15 MIN: Performed by: INTERNAL MEDICINE

## 2023-08-09 PROCEDURE — 7100000000 HC PACU RECOVERY - FIRST 15 MIN: Performed by: INTERNAL MEDICINE

## 2023-08-09 PROCEDURE — 83735 ASSAY OF MAGNESIUM: CPT

## 2023-08-09 PROCEDURE — 0D758ZZ DILATION OF ESOPHAGUS, VIA NATURAL OR ARTIFICIAL OPENING ENDOSCOPIC: ICD-10-PCS | Performed by: INTERNAL MEDICINE

## 2023-08-09 PROCEDURE — 3700000000 HC ANESTHESIA ATTENDED CARE: Performed by: INTERNAL MEDICINE

## 2023-08-09 PROCEDURE — 83880 ASSAY OF NATRIURETIC PEPTIDE: CPT

## 2023-08-09 PROCEDURE — 6360000002 HC RX W HCPCS: Performed by: NURSE PRACTITIONER

## 2023-08-09 PROCEDURE — 92526 ORAL FUNCTION THERAPY: CPT

## 2023-08-09 PROCEDURE — 0DB58ZX EXCISION OF ESOPHAGUS, VIA NATURAL OR ARTIFICIAL OPENING ENDOSCOPIC, DIAGNOSTIC: ICD-10-PCS | Performed by: INTERNAL MEDICINE

## 2023-08-09 PROCEDURE — C8929 TTE W OR WO FOL WCON,DOPPLER: HCPCS

## 2023-08-09 PROCEDURE — 2709999900 HC NON-CHARGEABLE SUPPLY: Performed by: INTERNAL MEDICINE

## 2023-08-09 PROCEDURE — 97535 SELF CARE MNGMENT TRAINING: CPT

## 2023-08-09 PROCEDURE — 6360000004 HC RX CONTRAST MEDICATION: Performed by: PHYSICIAN ASSISTANT

## 2023-08-09 PROCEDURE — 3609012400 HC EGD TRANSORAL BIOPSY SINGLE/MULTIPLE: Performed by: INTERNAL MEDICINE

## 2023-08-09 PROCEDURE — 6360000002 HC RX W HCPCS: Performed by: INTERNAL MEDICINE

## 2023-08-09 PROCEDURE — 6360000002 HC RX W HCPCS: Performed by: PHYSICIAN ASSISTANT

## 2023-08-09 PROCEDURE — 97530 THERAPEUTIC ACTIVITIES: CPT

## 2023-08-09 PROCEDURE — 2580000003 HC RX 258: Performed by: PHYSICIAN ASSISTANT

## 2023-08-09 PROCEDURE — 80048 BASIC METABOLIC PNL TOTAL CA: CPT

## 2023-08-09 PROCEDURE — 80076 HEPATIC FUNCTION PANEL: CPT

## 2023-08-09 PROCEDURE — 36415 COLL VENOUS BLD VENIPUNCTURE: CPT

## 2023-08-09 PROCEDURE — 6370000000 HC RX 637 (ALT 250 FOR IP): Performed by: PHYSICIAN ASSISTANT

## 2023-08-09 PROCEDURE — 2580000003 HC RX 258: Performed by: NURSE ANESTHETIST, CERTIFIED REGISTERED

## 2023-08-09 PROCEDURE — 88305 TISSUE EXAM BY PATHOLOGIST: CPT

## 2023-08-09 PROCEDURE — 3700000001 HC ADD 15 MINUTES (ANESTHESIA): Performed by: INTERNAL MEDICINE

## 2023-08-09 PROCEDURE — 6360000002 HC RX W HCPCS: Performed by: NURSE ANESTHETIST, CERTIFIED REGISTERED

## 2023-08-09 PROCEDURE — 3609012700 HC EGD DILATION SAVORY: Performed by: INTERNAL MEDICINE

## 2023-08-09 PROCEDURE — 2500000003 HC RX 250 WO HCPCS: Performed by: NURSE ANESTHETIST, CERTIFIED REGISTERED

## 2023-08-09 PROCEDURE — 2580000003 HC RX 258: Performed by: INTERNAL MEDICINE

## 2023-08-09 PROCEDURE — 1200000000 HC SEMI PRIVATE

## 2023-08-09 RX ORDER — AMOXICILLIN AND CLAVULANATE POTASSIUM 875; 125 MG/1; MG/1
1 TABLET, FILM COATED ORAL EVERY 12 HOURS SCHEDULED
Status: DISPENSED | OUTPATIENT
Start: 2023-08-09 | End: 2023-08-11

## 2023-08-09 RX ORDER — POTASSIUM CHLORIDE 20 MEQ/1
40 TABLET, EXTENDED RELEASE ORAL ONCE
Status: COMPLETED | OUTPATIENT
Start: 2023-08-09 | End: 2023-08-09

## 2023-08-09 RX ORDER — FUROSEMIDE 10 MG/ML
40 INJECTION INTRAMUSCULAR; INTRAVENOUS ONCE
Status: COMPLETED | OUTPATIENT
Start: 2023-08-09 | End: 2023-08-09

## 2023-08-09 RX ORDER — PROPOFOL 10 MG/ML
INJECTION, EMULSION INTRAVENOUS PRN
Status: DISCONTINUED | OUTPATIENT
Start: 2023-08-09 | End: 2023-08-09 | Stop reason: SDUPTHER

## 2023-08-09 RX ORDER — DEXTROSE MONOHYDRATE 50 MG/ML
INJECTION, SOLUTION INTRAVENOUS CONTINUOUS
Status: ACTIVE | OUTPATIENT
Start: 2023-08-09 | End: 2023-08-09

## 2023-08-09 RX ORDER — SODIUM CHLORIDE 9 MG/ML
INJECTION, SOLUTION INTRAVENOUS CONTINUOUS PRN
Status: DISCONTINUED | OUTPATIENT
Start: 2023-08-09 | End: 2023-08-09 | Stop reason: SDUPTHER

## 2023-08-09 RX ORDER — LIDOCAINE HYDROCHLORIDE 20 MG/ML
INJECTION, SOLUTION INFILTRATION; PERINEURAL PRN
Status: DISCONTINUED | OUTPATIENT
Start: 2023-08-09 | End: 2023-08-09 | Stop reason: SDUPTHER

## 2023-08-09 RX ORDER — ONDANSETRON 2 MG/ML
INJECTION INTRAMUSCULAR; INTRAVENOUS PRN
Status: DISCONTINUED | OUTPATIENT
Start: 2023-08-09 | End: 2023-08-09 | Stop reason: SDUPTHER

## 2023-08-09 RX ADMIN — ENOXAPARIN SODIUM 40 MG: 100 INJECTION SUBCUTANEOUS at 10:36

## 2023-08-09 RX ADMIN — SODIUM CHLORIDE, PRESERVATIVE FREE 10 ML: 5 INJECTION INTRAVENOUS at 00:41

## 2023-08-09 RX ADMIN — SODIUM CHLORIDE: 9 INJECTION, SOLUTION INTRAVENOUS at 08:32

## 2023-08-09 RX ADMIN — PROPOFOL 20 MG: 10 INJECTION, EMULSION INTRAVENOUS at 08:44

## 2023-08-09 RX ADMIN — DORZOLAMIDE HCL 1 DROP: 20 SOLUTION/ DROPS OPHTHALMIC at 20:32

## 2023-08-09 RX ADMIN — ESCITALOPRAM OXALATE 40 MG: 10 TABLET ORAL at 11:03

## 2023-08-09 RX ADMIN — PROPOFOL 30 MG: 10 INJECTION, EMULSION INTRAVENOUS at 08:38

## 2023-08-09 RX ADMIN — DORZOLAMIDE HCL 1 DROP: 20 SOLUTION/ DROPS OPHTHALMIC at 10:55

## 2023-08-09 RX ADMIN — PROPOFOL 30 MG: 10 INJECTION, EMULSION INTRAVENOUS at 08:42

## 2023-08-09 RX ADMIN — SODIUM CHLORIDE, PRESERVATIVE FREE 10 ML: 5 INJECTION INTRAVENOUS at 11:08

## 2023-08-09 RX ADMIN — AMOXICILLIN AND CLAVULANATE POTASSIUM 1 TABLET: 875; 125 TABLET, FILM COATED ORAL at 20:32

## 2023-08-09 RX ADMIN — POTASSIUM CHLORIDE 40 MEQ: 1500 TABLET, EXTENDED RELEASE ORAL at 10:31

## 2023-08-09 RX ADMIN — TAMSULOSIN HYDROCHLORIDE 0.8 MG: 0.4 CAPSULE ORAL at 10:31

## 2023-08-09 RX ADMIN — PIPERACILLIN AND TAZOBACTAM 3375 MG: 3; .375 INJECTION, POWDER, LYOPHILIZED, FOR SOLUTION INTRAVENOUS at 04:30

## 2023-08-09 RX ADMIN — LIDOCAINE HYDROCHLORIDE 40 MG: 20 INJECTION, SOLUTION INFILTRATION; PERINEURAL at 08:38

## 2023-08-09 RX ADMIN — PROPOFOL 30 MG: 10 INJECTION, EMULSION INTRAVENOUS at 08:40

## 2023-08-09 RX ADMIN — FUROSEMIDE 40 MG: 10 INJECTION, SOLUTION INTRAMUSCULAR; INTRAVENOUS at 22:14

## 2023-08-09 RX ADMIN — LATANOPROST 1 DROP: 50 SOLUTION OPHTHALMIC at 20:32

## 2023-08-09 RX ADMIN — TIMOLOL MALEATE 1 DROP: 5 SOLUTION OPHTHALMIC at 20:32

## 2023-08-09 RX ADMIN — HYDRALAZINE HYDROCHLORIDE 10 MG: 20 INJECTION INTRAMUSCULAR; INTRAVENOUS at 00:40

## 2023-08-09 RX ADMIN — DEXTROSE MONOHYDRATE: 50 INJECTION, SOLUTION INTRAVENOUS at 12:36

## 2023-08-09 RX ADMIN — SODIUM CHLORIDE, PRESERVATIVE FREE 10 ML: 5 INJECTION INTRAVENOUS at 04:39

## 2023-08-09 RX ADMIN — TIMOLOL MALEATE 1 DROP: 5 SOLUTION OPHTHALMIC at 11:09

## 2023-08-09 RX ADMIN — ONDANSETRON 4 MG: 2 INJECTION INTRAMUSCULAR; INTRAVENOUS at 08:38

## 2023-08-09 RX ADMIN — HYDRALAZINE HYDROCHLORIDE 10 MG: 20 INJECTION INTRAMUSCULAR; INTRAVENOUS at 15:58

## 2023-08-09 RX ADMIN — CYCLOBENZAPRINE 5 MG: 10 TABLET, FILM COATED ORAL at 11:03

## 2023-08-09 RX ADMIN — POLYETHYLENE GLYCOL 3350 17 G: 17 POWDER, FOR SOLUTION ORAL at 10:31

## 2023-08-09 RX ADMIN — PANTOPRAZOLE SODIUM 40 MG: 40 TABLET, DELAYED RELEASE ORAL at 16:00

## 2023-08-09 RX ADMIN — FINASTERIDE 5 MG: 5 TABLET, FILM COATED ORAL at 10:31

## 2023-08-09 RX ADMIN — AMOXICILLIN AND CLAVULANATE POTASSIUM 1 TABLET: 875; 125 TABLET, FILM COATED ORAL at 11:04

## 2023-08-09 RX ADMIN — HYDRALAZINE HYDROCHLORIDE 10 MG: 20 INJECTION INTRAMUSCULAR; INTRAVENOUS at 04:38

## 2023-08-09 RX ADMIN — PERFLUTREN 1.5 ML: 6.52 INJECTION, SUSPENSION INTRAVENOUS at 15:59

## 2023-08-09 ASSESSMENT — PAIN SCALES - GENERAL
PAINLEVEL_OUTOF10: 5
PAINLEVEL_OUTOF10: 6
PAINLEVEL_OUTOF10: 0

## 2023-08-09 ASSESSMENT — PAIN - FUNCTIONAL ASSESSMENT: PAIN_FUNCTIONAL_ASSESSMENT: ACTIVITIES ARE NOT PREVENTED

## 2023-08-09 ASSESSMENT — PAIN DESCRIPTION - ORIENTATION: ORIENTATION: RIGHT

## 2023-08-09 ASSESSMENT — PAIN DESCRIPTION - LOCATION: LOCATION: BACK;KNEE

## 2023-08-09 ASSESSMENT — PAIN DESCRIPTION - DESCRIPTORS: DESCRIPTORS: ACHING;SORE

## 2023-08-09 NOTE — CARE COORDINATION
Discharge Planning Note Re: Skilled Nursing     CM/SW noted consult for discharge planning. Chart reviewed. Noted recommendations for SNF. CM met with patient and spoke with daughter Ibis Hoover [phone]. Introduced self and explained role of CM and discharge planning. Patient is agreeable to SNF on dc. [place] of choice list was provided with basic dialogue that supports the patient's individualized plan of care/goals, treatment preferences and shares the quality data associated with the providers. [x] Yes [] No.  Patient and daughter Brandt Cee have reviewed the provided list and made selections. Referral made to   [facility] however [address] [place] [address] [phone] would not have any beds until the end of the week. Patient's second choice was: [address]: [place] [address]  Phone: [phone]  Fax: [phone] with [facility] ([phone]) facility liason and she stated they could accept the patient on discharge. Patient's daughter Brandt Cee called back and stated her next choices would be:    [facility]     Advised daughter and patient that [address] [place] [address] could accept the patient and they were agreeable to the discharge plan of care. CM/SW will follow-up on referrals and provide any additional documentation necessary to facilitate placement. CM to follow.     Electronically signed by Roddy Cordero RN on 8/9/2023 at 2:15 PM

## 2023-08-09 NOTE — H&P
Gastroenterology Note             Pre-operative History and Physical    Patient: Tabitha Saldana  : 1935  CSN:     History Obtained From:  patient and/or guardian. HISTORY OF PRESENT ILLNESS:    The patient is a 81 y.o. male  here for EGD  This a very pleasant 52-year-old male comes in today he has been having problems with some difficulty swallowing and he has had previous endoscopies and dilations in the past so today were doing EGD and and possible dilation    Past Medical History:    Past Medical History:   Diagnosis Date    Acid reflux     Anxiety     Arthritis     BPH (benign prostatic hyperplasia)     Constipation     Dementia (HCC)     Esophagitis     Glaucoma     MRSA infection 16    urine    Neurocognitive disorder      Past Surgical History:    Past Surgical History:   Procedure Laterality Date    HERNIA REPAIR Left     inguinal    KNEE CARTILAGE SURGERY Right      Medications Prior to Admission:   No current facility-administered medications on file prior to encounter.      Current Outpatient Medications on File Prior to Encounter   Medication Sig Dispense Refill    cyclobenzaprine (FLEXERIL) 5 MG tablet Take 1 tablet by mouth 3 times daily as needed for Muscle spasms      benzonatate (TESSALON) 100 MG capsule Take 1 capsule by mouth 3 times daily as needed for Cough      escitalopram (LEXAPRO) 20 MG tablet Take 2 tablets by mouth daily      bisacodyl (DULCOLAX) 5 MG EC tablet Take 1 tablet by mouth daily as needed for Constipation      Calcium Carbonate Antacid (CALCIUM CARBONATE PO) Take 1 tablet by mouth daily      Colloidal Oatmeal (EUCERIN BABY ECZEMA RELIEF) 1 % CREA Apply topically as needed      melatonin 3 MG TABS tablet Take 1 tablet by mouth nightly as needed      polyethylene glycol (GLYCOLAX) 17 g packet Take 1 packet by mouth daily as needed for Constipation      aluminum & magnesium hydroxide-simethicone (MAALOX) 200-200-20 MG/5ML SUSP suspension Take 30 mLs by mouth

## 2023-08-09 NOTE — PROGRESS NOTES
Reviewed patient's medical and surgical history in electronic record and with patient at the bedside. All questions regarding procedure answered. Called daughter Tim Beach for verbal phone consent. DNR suspended for this procedure. Scope number and equipment verified using a two person system.      Electronically signed by Indio Stanley RN on 8/9/2023 at 8:34 AM

## 2023-08-09 NOTE — PROCEDURES
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 year old male who has been admitted to the hospital because he has been having problems with difficulty swallowing has had this before he seen another gastroenterologist who has subsequently retired so today were doing an upper endoscopy to evaluate    Description of Procedure:  Informed consent was obtained from the patient after explanation of indications, benefits and possible risks and complications of the procedure. The patient was then taken to the endoscopy suite, placed in the left lateral decubitus position and the above IV sedation was administrered. 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 Impression: #1 ring of the distal esophagus but it was a nonobstructing ring #2 hiatal hernia #3 gastritis      Recommendations:  At this time the patient may have some age-related issues that could be causing his difficulty swallowing but there is not anything overt    Need to make sure he is on a proton pump inhibitor    He continues to have issues with swallowing and nausea and then will need to continue to evaluate he may need a right upper quadrant ultrasound and he may need either an upper GI series or motility study      Halima Webster MD, MD  [address]

## 2023-08-09 NOTE — PROGRESS NOTES
Facility/Department: [address]  Speech Language Pathology   Dysphagia Treatment Note    Patient: Suzie Robledo   : 1935   MRN: [id number]      Evaluation Date: 2023      Admitting Dx: Dehydration [E86.0]  REYES (acute kidney injury) ([address]) [H30.5]  Complicated UTI (urinary tract infection) [N39.0]  Urinary tract infection without hematuria, site unspecified [N39.0]  Nausea and vomiting, unspecified vomiting type [R11.2]  Treatment Diagnosis: Oropharyngeal Dysphagia   Pain: Denies                                              Diet and Treatment Recommendations 2023:  Diet Solids Recommendation:  Dysphagia I Puree  Liquid Consistency Recommendation: Thin liquids  Recommended form of Meds: Meds whole with water or Meds in puree    ** 1:1 feed assist     Compensatory strategies: Alternate solids/liquids , Upright as possible with all PO intake , Assist Feed , Small bites/sips , Eat/feed slowly, Remain upright 30-45 min     Assessment of Texture Tolerance:  Diet level prior to treatment: Dysphagia III Soft and bite sized , Thin liquids   Tolerance of Current Diet Level:RN reported pt appears to be tolerating current diet level  Poor oral intake     Impressions: Pt was positioned Upright in bed , awake and alert. Currently on room air. Pt reported continuation of a poor appetite. Notable confusion present this date. RN aware. Trials of thin liquids, puree , and soft solids were provided to assess swallow function. Pt fed by the SLP. Oral phase characterized by anterior bolus loss, prolonged bolus manipulation, decreased AP transit, and reduced bolus cohesion with puree solids. Pt demonstrated bolus expulsion prior to swallow initiation with all trials of soft solids. With puree and thin liquids, pt continuously reported that each bite was coming back up and was all over his hands despite no evidence of regurgitation or expulsion.  No overt clinical s/s of aspiration were assessed with puree textures and thin liquids. Confusion appears to be primarily impacting PO intake this date. Based on today's assessment recommend downgrade to Dysphagia I Puree  with Thin liquids , Meds whole with water or Meds in puree  and 1:1 feed assist for close diet monitoring and initiation with PO intake. Dysphagia Goals:   Pt will functionally tolerate recommended diet with no overt clinical s/s of aspiration (Ongoing 08/09/23)  Pt will functionally tolerate ongoing assessment of swallow function with diet to be determined as indicated (Ongoing 08/09/23)  Pt will demonstrate understanding of aspiration risk and precautions via education/demonstration with occasional prompting (Ongoing 08/09/23)  Pt will advance to least restrictive diet as indicated (Ongoing 08/09/23)    Plan:   3-5 times per week during acute care stay. Patient/Family Education:  Provided education regarding role of SLP, recommendations and general speech pathology plan of care. [x] Pt verbalized understanding and agreement   [x] Pt requires ongoing learning   [] No evidence of comprehension     Discharge Recommendations:    Discharge recommendations to be determined pending ongoing follow-up during acute care stay    Treatment time:  Timed Code Treatment Minutes: 0  Total Treatment time: 16 min    If patient discharges prior to next session this note will serve as a discharge summary. Signature: Galilea Young,  Speech-Language Pathology     The speech-language pathologist was present, directed the patient's care, made skilled judgment and was responsible for assessment and treatment.     Win Abarca M.A., [address] [facility]  Speech-Language Pathologist

## 2023-08-09 NOTE — PROGRESS NOTES
[facility] Department   Phone: [phone]    Occupational Therapy    [] Initial Evaluation            [x] Daily Treatment Note         [] Discharge Summary      Patient: Ciera Berkowitz   : 1935   MRN: [id number]   Date of Service:  2023    Admitting Diagnosis:  UTI (urinary tract infection)    Current Admission Summary:   Ciera Berkowitz is a 81 y.o. male with history of HTN, dementia, GERD, glaucoma, BPH who came to ER from [address] [place] [address] with fall and dizziness. Patient has been constipated and has urinary frequency. Patient vomited twice. No CP, SOB, HA or abdominal pain. No fevers, chills or NS. No dysphagia, melena or hematochezia. Patient does not remember the fall. Otherwise complete ROS is negative unless listed above. Past Medical History:  has a past medical history of Acid reflux, Anxiety, Arthritis, BPH (benign prostatic hyperplasia), Constipation, Dementia ([address]), Esophagitis, Glaucoma, MRSA infection, and Neurocognitive disorder. Past Surgical History:  has a past surgical history that includes Knee cartilage surgery (Right); hernia repair (Left); Upper gastrointestinal endoscopy (N/A, 2023); and Upper gastrointestinal endoscopy (N/A, 2023). Discharge Recommendations: Ciera Berkowitz scored a 12/24 on the AM-PAC ADL Inpatient form. Current research shows that an AM-PAC score of 17 or less is typically not associated with a discharge to the patient's home setting. Based on the patient's AM-PAC score and their current ADL deficits, it is recommended that the patient have 3-5 sessions per week of Occupational Therapy at d/c to increase the patient's independence. Please see assessment section for further patient specific details. If patient discharges prior to next session this note will serve as a discharge summary. Please see below for the latest assessment towards goals.       DME Required For Discharge: DME to be term memory, decreased long term memory  Safety Judgement: decreased awareness of need for assistance, decreased awareness of need for safety  Problem Solving: decreased awareness of errors  Insights: not aware of deficits  Initiation: requires cues for some  Sequencing: requires cues for all  Comments: history of Dementia        Orientation:    oriented to person, oriented to place, oriented to situation, and disoriented to time , knew month but not year, knows in hospital due to pnemonia    Command Following:   accurately follows one step commands      Education  Barriers To Learning: cognition  Patient Education: patient educated on goals, OT role and benefits, plan of care, precautions, ADL adaptive strategies, proper use of assistive device/equipment, energy conservation, orientation, disease specific education, transfer training, discharge recommendations  Learning Assessment:  patient will require reinforcement due to cognitive deficits      Assessment  Activity Tolerance: Patient with decreased tolerance this date due to c/o right knee pain  Impairments Requiring Therapeutic Intervention: decreased functional mobility, decreased ADL status, decreased safety awareness, decreased cognition, decreased endurance, decreased balance, decreased coordination, decreased posture  Prognosis: fair    Clinical Assessment: Patient with the above deficits impacting occupational performance and patient is functioning below baseline, skilled OT services needed to address deficits to facilitate safe return to PLOF. Patient needing more assist this date for sit <> stand and ADLs, Patient would benefit from skilled OT treatment.      Safety Interventions: patient left in bed, bed alarm in place, nurse notified, and left with transporter to test     Plan  Frequency: 3-5 x/per week  Current Treatment Recommendations: balance training, functional mobility training, transfer training, endurance training, patient/caregiver education,

## 2023-08-09 NOTE — PROGRESS NOTES
Patient has intermittent confusion. However, patient able to describe to RN at this time the plans today for an EGD. Patient able to state \"I've have had several in the past for my swallowing. \" Patient is aware and agreeable to it. Patient signed consent at this time.

## 2023-08-09 NOTE — ANESTHESIA PRE PROCEDURE
Department of Anesthesiology  Preprocedure Note       Name:  Cipriano Chen   Age:  81 y.o.  :  1935                                          MRN:  [id number]         Date:  2023      Surgeon: Jed Hall):  Melanie Balderas MD    Procedure: Procedure(s):  EGD BIOPSY  EGD DILATION SAVORY    Medications prior to admission:   Prior to Admission medications    Medication Sig Start Date End Date Taking?  Authorizing Provider   cyclobenzaprine (FLEXERIL) 5 MG tablet Take 1 tablet by mouth 3 times daily as needed for Muscle spasms   Yes Historical Provider, MD   benzonatate (TESSALON) 100 MG capsule Take 1 capsule by mouth 3 times daily as needed for Cough   Yes Historical Provider, MD   escitalopram (LEXAPRO) 20 MG tablet Take 2 tablets by mouth daily   Yes Historical Provider, MD   bisacodyl (DULCOLAX) 5 MG EC tablet Take 1 tablet by mouth daily as needed for Constipation   Yes Historical Provider, MD   Calcium Carbonate Antacid (CALCIUM CARBONATE PO) Take 1 tablet by mouth daily   Yes Historical Provider, MD   Colloidal Oatmeal (EUCERIN BABY ECZEMA RELIEF) 1 % CREA Apply topically as needed   Yes Historical Provider, MD   melatonin 3 MG TABS tablet Take 1 tablet by mouth nightly as needed   Yes Historical Provider, MD   polyethylene glycol (GLYCOLAX) 17 g packet Take 1 packet by mouth daily as needed for Constipation   Yes Historical Provider, MD   aluminum & magnesium hydroxide-simethicone (MAALOX) 200-200-20 MG/5ML SUSP suspension Take 30 mLs by mouth every 6 hours as needed for Indigestion   Yes Historical Provider, MD   acetaminophen (TYLENOL) 500 MG tablet Take 2 tablets by mouth every 8 hours as needed for Pain   Yes Historical Provider, MD   omeprazole (PRILOSEC) 20 MG delayed release capsule TAKE 1 CAPSULE BY MOUTH TWICE A DAY 23   Nenita Tellez MD   tamsulosin (FLOMAX) 0.4 MG capsule TAKE 2 CAPSULES BY MOUTH EVERY DAY 23   Nenita Tellez MD   finasteride (PROSCAR) 5 MG tablet TAKE 1 TABLET

## 2023-08-09 NOTE — PROGRESS NOTES
Definity imaging enhancing agent administered via IV during echocardiogram per protocol. Patient tolerated well.

## 2023-08-09 NOTE — ANESTHESIA POSTPROCEDURE EVALUATION
Department of Anesthesiology  Postprocedure Note    Patient: Eloy Mazariegos  MRN: [id number]  YOB: 1935  Date of evaluation: 8/9/2023      Procedure Summary     Date: 08/09/23 Room / Location: [address] [place] [address]    Anesthesia Start: 6220 Anesthesia Stop: 2372    Procedures:       EGD BIOPSY (Abdomen)      EGD DILATION SAVORY (Abdomen) Diagnosis:       Dysphagia, unspecified type      (Dysphagia, unspecified type [R13.10])    Surgeons: Corin Dumont MD Responsible Provider: Maritza Pope MD    Anesthesia Type: MAC ASA Status: 3          Anesthesia Type: No value filed.     Gatito Phase I: Gatito Score: 9    Gatito Phase II:        Anesthesia Post Evaluation    Patient location during evaluation: PACU  Patient participation: complete - patient participated  Level of consciousness: awake  Airway patency: patent  Nausea & Vomiting: no nausea and no vomiting  Complications: no  Cardiovascular status: blood pressure returned to baseline  Respiratory status: acceptable  Hydration status: stable  Pain management: satisfactory to patient

## 2023-08-09 NOTE — PROGRESS NOTES
[address] [facility]    8/9/2023 10:27 AM        Name: Dominga Porter Admitted: 8/5/2023  Primary Care Provider: Guillaume Atkins MD (Tel: [phone])      Subjective:  . Lives at [facility],  [facility]  Admitted with fall/ dizziness   Noted to have possible UTI/ Aspiration PNA  And fecal impaction. Had EGD this am. He is slightly confused saying he needs to be in his chair. Also complains of some nausea. EGD negative for stricture. I was informed by the nurse that patient initially had lou place but was then removed. He had retention and had to be straight cathed however this was traumatic. Then had a coude catheter placed.      Reviewed interval ancillary notes    Current Medications  perflutren lipid microspheres (DEFINITY) injection 1.5 mL, ONCE PRN  potassium chloride (KLOR-CON M) extended release tablet 40 mEq, Once  amoxicillin-clavulanate (AUGMENTIN) 875-125 MG per tablet 1 tablet, 2 times per day  sennosides-docusate sodium (SENOKOT-S) 8.6-50 MG tablet 2 tablet, Daily PRN  hydrALAZINE (APRESOLINE) injection 10 mg, Q4H PRN  labetalol (NORMODYNE;TRANDATE) injection 10 mg, Q4H PRN  benzocaine-menthol (CEPACOL SORE THROAT) lozenge 1 lozenge, Q2H PRN  aluminum & magnesium hydroxide-simethicone (MAALOX) 200-200-20 MG/5ML suspension 30 mL, Q6H PRN  benzonatate (TESSALON) capsule 100 mg, TID PRN  bisacodyl (DULCOLAX) EC tablet 5 mg, Daily PRN  cyclobenzaprine (FLEXERIL) tablet 5 mg, TID PRN  escitalopram (LEXAPRO) tablet 40 mg, Daily  finasteride (PROSCAR) tablet 5 mg, Daily  latanoprost (XALATAN) 0.005 % ophthalmic solution 1 drop, Nightly  melatonin tablet 3 mg, Nightly PRN  pantoprazole (PROTONIX) tablet 40 mg, BID AC  polyethylene glycol (GLYCOLAX) packet 17 g, Daily  polyvinyl alcohol (LIQUIFILM TEARS) 1.4 % ophthalmic solution 1 drop, PRN  tamsulosin (FLOMAX) capsule 0.8 mg, Daily  sodium chloride flush 0.9 % injection 5-40 mL, 2 times per day  sodium

## 2023-08-09 NOTE — PLAN OF CARE
Problem: Discharge Planning  Goal: Discharge to home or other facility with appropriate resources  8/8/2023 2342 by Ivania Lopes RN  Outcome: Progressing  8/8/2023 1942 by Britta Hemphill RN  Outcome: Progressing     Problem: Skin/Tissue Integrity  Goal: Absence of new skin breakdown  Description: 1. Monitor for areas of redness and/or skin breakdown  2. Assess vascular access sites hourly  3. Every 4-6 hours minimum:  Change oxygen saturation probe site  4. Every 4-6 hours:  If on nasal continuous positive airway pressure, respiratory therapy assess nares and determine need for appliance change or resting period.   8/8/2023 2342 by Ivania Lopes RN  Outcome: Progressing  8/8/2023 1942 by Britta Hemphill RN  Outcome: Progressing     Problem: Safety - Adult  Goal: Free from fall injury  8/8/2023 2342 by Ivania Lopes RN  Outcome: Progressing  8/8/2023 1942 by Britta Hemphill RN  Outcome: Progressing     Problem: ABCDS Injury Assessment  Goal: Absence of physical injury  8/8/2023 2342 by Ivania Lopes RN  Outcome: Progressing  8/8/2023 1942 by Britta Hemphill RN  Outcome: Progressing     Problem: Neurosensory - Adult  Goal: Achieves stable or improved neurological status  8/8/2023 1942 by Britta Hemphill RN  Outcome: Progressing  Goal: Absence of seizures  8/8/2023 1942 by Britta Hemphill RN  Outcome: Progressing  Goal: Remains free of injury related to seizures activity  8/8/2023 1942 by Britta Hemphill RN  Outcome: Progressing     Problem: Respiratory - Adult  Goal: Achieves optimal ventilation and oxygenation  8/8/2023 2342 by Ivania Lopes RN  Outcome: Progressing  8/8/2023 1942 by Britta Hemphill RN  Outcome: Progressing     Problem: Skin/Tissue Integrity - Adult  Goal: Skin integrity remains intact  8/8/2023 2342 by Ivania Lopes RN  Outcome: Progressing  8/8/2023 1942 by Britta Hemphill RN  Outcome: Progressing  Goal: Oral mucous membranes remain

## 2023-08-09 NOTE — PROGRESS NOTES
deficits, it is recommended that the patient have 3-5 sessions per week of Physical Therapy at d/c to increase the patient's independence. Please see assessment section for further patient specific details. If patient discharges prior to next session this note will serve as a discharge summary. Please see below for the latest assessment towards goals. DME Required For Discharge: DME to be determined at next level of care, DME to be determined pending patient progress  Precautions/Restrictions: high fall risk  Weight Bearing Restrictions: weight bearing as tolerated  [] Right Upper Extremity  [] Left Upper Extremity [] Right Lower Extremity  [] Left Lower Extremity     Required Braces/Orthotics: no braces required   [] Right  [] Left  Positional Restrictions:no positional restrictions    Pre-Admission Information   Lives With: alone    Type of Home: assisted living, [place], room #137  Home Layout: one level, able to live on main level  Home Access: level entry  Bathroom Layout: walk in shower  Bathroom Equipment: grab bars in shower, grab bars around toilet, shower chair  Toilet Height: standard height  Home Equipment: rolling walker, manual wheelchair  Transfer Assistance: modified independent with use of RW  Ambulation Assistance:modified independent with use of RW  ADL Assistance: independent with all ADL's  IADL Assistance: requires assistance with all homemaking tasks  Active :        [] Yes  [x] No  Hand Dominance: [] Left  [x] Right  Current Employment: retired. 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 Recent Falls: \"I've only fallen once in 6 years and that was 3 years ago. \"    Examination   Vision:   Vision Gross Assessment: Impaired and Vision Corrective Device: wears glasses for reading  Hearing:   WFL, no hearing aid  Observation:   General Observation:  Patient supine in bed w/ HOB elevated, feet out on (L) side of bed  Posture: (standing)  Forward flexion, thoracic with time and repetition    Education  Barriers To Learning: cognition  Patient Education: patient educated on goals, PT role and benefits, plan of care, general safety, functional mobility training, proper use of assistive device/equipment, orientation, pressure relief, injury prevention, transfer training, discharge recommendations  Learning Assessment:  patient verbalizes understanding, would benefit from continued reinforcement, patient will require reinforcement due to cognitive deficits    Assessment  Activity Tolerance: Limited by weakness, fatigue, balance deficits. Vitals at rest prior to activity: /75, HR 75 bpm, SpO2 92% on RA. Pt reporting increased knee pain at rest and with mobility this date. Impairments Requiring Therapeutic Intervention: decreased functional mobility, decreased ADL status, decreased strength, decreased safety awareness, decreased cognition, decreased endurance, decreased balance, decreased coordination, increased pain, decreased posture  Prognosis: fair  Clinical Assessment: Patient demonstrates decline in functional mobility this date and increased confusion. Pt with difficulty weight shifting and motor planning to advance LEs during lateral steps this date. Pt with poor tolerance to standing as well due to increased knee pain and fatigue. Further mobility and therapy limited by arrival of transport but he remains  unsafe to return home even at an assisted living level. Recommend 24 hour assist and continued therapy at d/c.   Safety Interventions: patient left in bed, gait belt, patient at risk for falls, nurse notified, and care transitioned to transporter    Plan  Frequency: 3-5 x/per week  Current Treatment Recommendations: strengthening, ROM, balance training, functional mobility training, transfer training, gait training, endurance training, patient/caregiver education, home exercise program, safety education, and equipment evaluation/education    Goals  Patient Goals:

## 2023-08-09 NOTE — PROGRESS NOTES
Patient confused at this time. Patient stating [place] you are doing to me? \". Patient reoriented and fell back to sleep. Will continue to monitor.

## 2023-08-09 NOTE — ANESTHESIA PRE PROCEDURE
Department of Anesthesiology  Preprocedure Note       Name:  Delisa Dolan   Age:  81 y.o.  :  1935                                          MRN:  [id number]         Date:  2023      Surgeon: India Bhardwaj):  Anya Higgins MD    Procedure: Procedure(s):  EGD DIAGNOSTIC ONLY    Medications prior to admission:   Prior to Admission medications    Medication Sig Start Date End Date Taking?  Authorizing Provider   cyclobenzaprine (FLEXERIL) 5 MG tablet Take 1 tablet by mouth 3 times daily as needed for Muscle spasms   Yes Historical Provider, MD   benzonatate (TESSALON) 100 MG capsule Take 1 capsule by mouth 3 times daily as needed for Cough   Yes Historical Provider, MD   escitalopram (LEXAPRO) 20 MG tablet Take 2 tablets by mouth daily   Yes Historical Provider, MD   bisacodyl (DULCOLAX) 5 MG EC tablet Take 1 tablet by mouth daily as needed for Constipation   Yes Historical Provider, MD   Calcium Carbonate Antacid (CALCIUM CARBONATE PO) Take 1 tablet by mouth daily   Yes Historical Provider, MD   Colloidal Oatmeal (EUCERIN BABY ECZEMA RELIEF) 1 % CREA Apply topically as needed   Yes Historical Provider, MD   melatonin 3 MG TABS tablet Take 1 tablet by mouth nightly as needed   Yes Historical Provider, MD   polyethylene glycol (GLYCOLAX) 17 g packet Take 1 packet by mouth daily as needed for Constipation   Yes Historical Provider, MD   aluminum & magnesium hydroxide-simethicone (MAALOX) 200-200-20 MG/5ML SUSP suspension Take 30 mLs by mouth every 6 hours as needed for Indigestion   Yes Historical Provider, MD   acetaminophen (TYLENOL) 500 MG tablet Take 2 tablets by mouth every 8 hours as needed for Pain   Yes Historical Provider, MD   omeprazole (PRILOSEC) 20 MG delayed release capsule TAKE 1 CAPSULE BY MOUTH TWICE A DAY 23   Bola Rodriguez MD   tamsulosin (FLOMAX) 0.4 MG capsule TAKE 2 CAPSULES BY MOUTH EVERY DAY 23   Bola Rodriguez MD   finasteride (PROSCAR) 5 MG tablet TAKE 1 TABLET BY MOUTH

## 2023-08-09 NOTE — PROGRESS NOTES
Pt arrived from Endo to PACU bay 2. Report received from Endo staff. Pt arousable to voice. Pt on 6L 02, NSR/SB, and VSS. Will continue to monitor.

## 2023-08-10 ENCOUNTER — APPOINTMENT (OUTPATIENT)
Dept: GENERAL RADIOLOGY | Age: 88
DRG: 987 | End: 2023-08-10
Payer: MEDICARE

## 2023-08-10 PROBLEM — I50.42 SYSTOLIC AND DIASTOLIC CHF, CHRONIC (HCC): Status: ACTIVE | Noted: 2023-08-10

## 2023-08-10 PROBLEM — E87.70 HYPERVOLEMIA: Status: ACTIVE | Noted: 2023-08-10

## 2023-08-10 LAB
ANION GAP SERPL CALCULATED.3IONS-SCNC: 9 MMOL/L (ref 3–16)
BASOPHILS # BLD: 0 K/UL (ref 0–0.2)
BASOPHILS NFR BLD: 0 %
BUN SERPL-MCNC: 21 MG/DL (ref 7–20)
CALCIUM SERPL-MCNC: 9 MG/DL (ref 8.3–10.6)
CHLORIDE SERPL-SCNC: 113 MMOL/L (ref 99–110)
CO2 SERPL-SCNC: 25 MMOL/L (ref 21–32)
CREAT SERPL-MCNC: 0.9 MG/DL (ref 0.8–1.3)
DEPRECATED RDW RBC AUTO: 14.7 % (ref 12.4–15.4)
EKG ATRIAL RATE: 312 BPM
EKG DIAGNOSIS: NORMAL
EKG Q-T INTERVAL: 322 MS
EKG QRS DURATION: 78 MS
EKG QTC CALCULATION (BAZETT): 433 MS
EKG R AXIS: -72 DEGREES
EKG T AXIS: 88 DEGREES
EKG VENTRICULAR RATE: 109 BPM
EOSINOPHIL # BLD: 0 K/UL (ref 0–0.6)
EOSINOPHIL NFR BLD: 0 %
GFR SERPLBLD CREATININE-BSD FMLA CKD-EPI: >60 ML/MIN/{1.73_M2}
GLUCOSE SERPL-MCNC: 122 MG/DL (ref 70–99)
HCT VFR BLD AUTO: 34.9 % (ref 40.5–52.5)
HGB BLD-MCNC: 11.5 G/DL (ref 13.5–17.5)
LYMPHOCYTES # BLD: 0.8 K/UL (ref 1–5.1)
LYMPHOCYTES NFR BLD: 11 %
MAGNESIUM SERPL-MCNC: 2.1 MG/DL (ref 1.8–2.4)
MCH RBC QN AUTO: 30.1 PG (ref 26–34)
MCHC RBC AUTO-ENTMCNC: 32.9 G/DL (ref 31–36)
MCV RBC AUTO: 91.5 FL (ref 80–100)
MONOCYTES # BLD: 0.5 K/UL (ref 0–1.3)
MONOCYTES NFR BLD: 6 %
NEUTROPHILS # BLD: 6.3 K/UL (ref 1.7–7.7)
NEUTROPHILS NFR BLD: 83 %
NT-PROBNP SERPL-MCNC: 6532 PG/ML (ref 0–449)
PLATELET # BLD AUTO: 185 K/UL (ref 135–450)
PLATELET BLD QL SMEAR: ADEQUATE
PMV BLD AUTO: 11.4 FL (ref 5–10.5)
POTASSIUM SERPL-SCNC: 3.2 MMOL/L (ref 3.5–5.1)
RBC # BLD AUTO: 3.82 M/UL (ref 4.2–5.9)
SLIDE REVIEW: ABNORMAL
SODIUM SERPL-SCNC: 147 MMOL/L (ref 136–145)
WBC # BLD AUTO: 7.6 K/UL (ref 4–11)

## 2023-08-10 PROCEDURE — 6360000002 HC RX W HCPCS: Performed by: INTERNAL MEDICINE

## 2023-08-10 PROCEDURE — 2580000003 HC RX 258: Performed by: INTERNAL MEDICINE

## 2023-08-10 PROCEDURE — 6370000000 HC RX 637 (ALT 250 FOR IP): Performed by: NURSE PRACTITIONER

## 2023-08-10 PROCEDURE — 83880 ASSAY OF NATRIURETIC PEPTIDE: CPT

## 2023-08-10 PROCEDURE — 74018 RADEX ABDOMEN 1 VIEW: CPT

## 2023-08-10 PROCEDURE — 2500000003 HC RX 250 WO HCPCS: Performed by: PHYSICIAN ASSISTANT

## 2023-08-10 PROCEDURE — 6370000000 HC RX 637 (ALT 250 FOR IP): Performed by: INTERNAL MEDICINE

## 2023-08-10 PROCEDURE — 99222 1ST HOSP IP/OBS MODERATE 55: CPT | Performed by: INTERNAL MEDICINE

## 2023-08-10 PROCEDURE — 85025 COMPLETE CBC W/AUTO DIFF WBC: CPT

## 2023-08-10 PROCEDURE — 93010 ELECTROCARDIOGRAM REPORT: CPT | Performed by: INTERNAL MEDICINE

## 2023-08-10 PROCEDURE — 92526 ORAL FUNCTION THERAPY: CPT

## 2023-08-10 PROCEDURE — 97530 THERAPEUTIC ACTIVITIES: CPT

## 2023-08-10 PROCEDURE — 6370000000 HC RX 637 (ALT 250 FOR IP): Performed by: PHYSICIAN ASSISTANT

## 2023-08-10 PROCEDURE — 36415 COLL VENOUS BLD VENIPUNCTURE: CPT

## 2023-08-10 PROCEDURE — 1200000000 HC SEMI PRIVATE

## 2023-08-10 PROCEDURE — 83735 ASSAY OF MAGNESIUM: CPT

## 2023-08-10 PROCEDURE — 80048 BASIC METABOLIC PNL TOTAL CA: CPT

## 2023-08-10 PROCEDURE — 97116 GAIT TRAINING THERAPY: CPT

## 2023-08-10 PROCEDURE — 97535 SELF CARE MNGMENT TRAINING: CPT

## 2023-08-10 PROCEDURE — 99223 1ST HOSP IP/OBS HIGH 75: CPT | Performed by: INTERNAL MEDICINE

## 2023-08-10 PROCEDURE — 93005 ELECTROCARDIOGRAM TRACING: CPT | Performed by: PHYSICIAN ASSISTANT

## 2023-08-10 RX ORDER — FUROSEMIDE 20 MG/1
20 TABLET ORAL DAILY
Status: DISCONTINUED | OUTPATIENT
Start: 2023-08-10 | End: 2023-08-15 | Stop reason: HOSPADM

## 2023-08-10 RX ORDER — ENOXAPARIN SODIUM 100 MG/ML
70 INJECTION SUBCUTANEOUS 2 TIMES DAILY
Status: DISCONTINUED | OUTPATIENT
Start: 2023-08-10 | End: 2023-08-15

## 2023-08-10 RX ORDER — LISINOPRIL 5 MG/1
5 TABLET ORAL DAILY
Status: DISCONTINUED | OUTPATIENT
Start: 2023-08-10 | End: 2023-08-12

## 2023-08-10 RX ORDER — TRAMADOL HYDROCHLORIDE 50 MG/1
50 TABLET ORAL EVERY 6 HOURS PRN
Status: DISPENSED | OUTPATIENT
Start: 2023-08-10 | End: 2023-08-12

## 2023-08-10 RX ORDER — POTASSIUM CHLORIDE 20 MEQ/1
40 TABLET, EXTENDED RELEASE ORAL PRN
Status: DISCONTINUED | OUTPATIENT
Start: 2023-08-10 | End: 2023-08-15 | Stop reason: HOSPADM

## 2023-08-10 RX ORDER — METOPROLOL TARTRATE 5 MG/5ML
5 INJECTION INTRAVENOUS EVERY 6 HOURS
Status: DISCONTINUED | OUTPATIENT
Start: 2023-08-10 | End: 2023-08-10

## 2023-08-10 RX ORDER — METOPROLOL SUCCINATE 25 MG/1
25 TABLET, EXTENDED RELEASE ORAL 2 TIMES DAILY
Status: DISCONTINUED | OUTPATIENT
Start: 2023-08-10 | End: 2023-08-11

## 2023-08-10 RX ORDER — POTASSIUM CHLORIDE 7.45 MG/ML
10 INJECTION INTRAVENOUS PRN
Status: DISCONTINUED | OUTPATIENT
Start: 2023-08-10 | End: 2023-08-15 | Stop reason: HOSPADM

## 2023-08-10 RX ORDER — MAGNESIUM SULFATE IN WATER 40 MG/ML
2000 INJECTION, SOLUTION INTRAVENOUS PRN
Status: DISCONTINUED | OUTPATIENT
Start: 2023-08-10 | End: 2023-08-15 | Stop reason: HOSPADM

## 2023-08-10 RX ADMIN — PANTOPRAZOLE SODIUM 40 MG: 40 TABLET, DELAYED RELEASE ORAL at 17:55

## 2023-08-10 RX ADMIN — TIMOLOL MALEATE 1 DROP: 5 SOLUTION OPHTHALMIC at 08:39

## 2023-08-10 RX ADMIN — AMOXICILLIN AND CLAVULANATE POTASSIUM 1 TABLET: 875; 125 TABLET, FILM COATED ORAL at 22:09

## 2023-08-10 RX ADMIN — DORZOLAMIDE HCL 1 DROP: 20 SOLUTION/ DROPS OPHTHALMIC at 22:22

## 2023-08-10 RX ADMIN — LATANOPROST 1 DROP: 50 SOLUTION OPHTHALMIC at 22:22

## 2023-08-10 RX ADMIN — ESCITALOPRAM OXALATE 40 MG: 10 TABLET ORAL at 08:38

## 2023-08-10 RX ADMIN — TRAMADOL HYDROCHLORIDE 50 MG: 50 TABLET ORAL at 22:18

## 2023-08-10 RX ADMIN — DORZOLAMIDE HCL 1 DROP: 20 SOLUTION/ DROPS OPHTHALMIC at 08:39

## 2023-08-10 RX ADMIN — ONDANSETRON 4 MG: 2 INJECTION INTRAMUSCULAR; INTRAVENOUS at 12:22

## 2023-08-10 RX ADMIN — FINASTERIDE 5 MG: 5 TABLET, FILM COATED ORAL at 08:39

## 2023-08-10 RX ADMIN — FUROSEMIDE 20 MG: 20 TABLET ORAL at 17:55

## 2023-08-10 RX ADMIN — PANTOPRAZOLE SODIUM 40 MG: 40 TABLET, DELAYED RELEASE ORAL at 05:54

## 2023-08-10 RX ADMIN — METOPROLOL TARTRATE 5 MG: 5 INJECTION INTRAVENOUS at 12:24

## 2023-08-10 RX ADMIN — METOPROLOL SUCCINATE 25 MG: 25 TABLET, EXTENDED RELEASE ORAL at 18:04

## 2023-08-10 RX ADMIN — AMOXICILLIN AND CLAVULANATE POTASSIUM 1 TABLET: 875; 125 TABLET, FILM COATED ORAL at 08:38

## 2023-08-10 RX ADMIN — LISINOPRIL 5 MG: 5 TABLET ORAL at 18:04

## 2023-08-10 RX ADMIN — SODIUM CHLORIDE, PRESERVATIVE FREE 10 ML: 5 INJECTION INTRAVENOUS at 22:09

## 2023-08-10 RX ADMIN — TAMSULOSIN HYDROCHLORIDE 0.8 MG: 0.4 CAPSULE ORAL at 08:38

## 2023-08-10 RX ADMIN — ENOXAPARIN SODIUM 40 MG: 100 INJECTION SUBCUTANEOUS at 08:38

## 2023-08-10 RX ADMIN — POTASSIUM CHLORIDE 40 MEQ: 1500 TABLET, EXTENDED RELEASE ORAL at 17:55

## 2023-08-10 RX ADMIN — TIMOLOL MALEATE 1 DROP: 5 SOLUTION OPHTHALMIC at 22:22

## 2023-08-10 RX ADMIN — ENOXAPARIN SODIUM 70 MG: 100 INJECTION SUBCUTANEOUS at 22:09

## 2023-08-10 RX ADMIN — POLYETHYLENE GLYCOL 3350 17 G: 17 POWDER, FOR SOLUTION ORAL at 08:39

## 2023-08-10 ASSESSMENT — PAIN SCALES - GENERAL: PAINLEVEL_OUTOF10: 6

## 2023-08-10 ASSESSMENT — PAIN DESCRIPTION - LOCATION: LOCATION: BACK

## 2023-08-10 NOTE — PROGRESS NOTES
[facility] Department   Phone: [phone]    Physical Therapy    [] Initial Evaluation            [x] Daily Treatment Note         [] Discharge Summary      Patient: Shawn Martin   : 1935   MRN: [id number]   Date of Service:  8/10/2023  Admitting Diagnosis: UTI (urinary tract infection)    Current Admission Summary: Chief complaint:         Chief Complaint   Patient presents with    [facility] d/t fall that happened last night. Pt states he does not remember falling. Pt alert and oriented during triage     Dizziness       Pt states he also endorses dizziness and overall weakness upon arrival to the ER. Also had 2 episodes of emesis PTA and upon arrival. Pt Lisbet Alcazar is a 81 y.o. male with history of HTN, dementia, GERD, glaucoma, BPH who came to ER from [address] [place] [address] with fall and dizziness. Patient has been constipated and has urinary frequency. Patient vomited twice. No CP, SOB, HA or abdominal pain. No fevers, chills or NS. No dysphagia, melena or hematochezia. Patient does not remember the fall. Otherwise complete ROS is negative unless listed above. Past Medical History:  has a past medical history of Acid reflux, Anxiety, Arthritis, BPH (benign prostatic hyperplasia), Constipation, Dementia ([address]), Esophagitis, Glaucoma, MRSA infection, and Neurocognitive disorder. Past Surgical History:  has a past surgical history that includes Knee cartilage surgery (Right); hernia repair (Left); Upper gastrointestinal endoscopy (N/A, 2023); and Esophagus dilation (N/A, 2023). Discharge Recommendations: Shawn Martin scored a 14/24 on the AM-PAC short mobility form. Current research shows that an AM-PAC score of 17 or less is typically not associated with a discharge to the patient's home setting.  Based on the patient's AM-PAC score and their current functional mobility deficits, it is 1042   Time Out     1124   Minutes     42     Timed Code Treatment Minutes:  42 minutes  Total Treatment Minutes:  42 minutes       Electronically Signed By: Tiny Andrade PT, DPT #[id number]

## 2023-08-10 NOTE — PROGRESS NOTES
Messaged  No morning labs are ordered. Waiting to hear back. No message/orders received. Charge nurse said to let day shift know.

## 2023-08-10 NOTE — PROGRESS NOTES
Facility/Department: [address] NURSING  Speech Language Pathology   Dysphagia Treatment Note    Patient: Elza Kessler   : 1935   MRN: [id number]      Evaluation Date: 8/10/2023      Admitting Dx: Dehydration [E86.0]  REYES (acute kidney injury) ([address]) [X68.2]  Complicated UTI (urinary tract infection) [N39.0]  Urinary tract infection without hematuria, site unspecified [N39.0]  Nausea and vomiting, unspecified vomiting type [R11.2]  Treatment Diagnosis: Oropharyngeal Dysphagia   Pain: Reported pain in back, RN notified                                              Diet and Treatment Recommendations 8/10/2023:  Diet Solids Recommendation:  Dysphagia I Puree  Liquid Consistency Recommendation: Thin liquids  Recommended form of Meds: Meds whole with water or Meds in puree    ** 1:1 feed assist; pt may benefit from dietitian consultation d/t reduced PO intake    EGD : #1 ring of the distal esophagus but it was a nonobstructing ring #2 hiatal hernia #3 gastritis    Compensatory strategies: Alternate solids/liquids , Upright as possible with all PO intake , Assist Feed , Small bites/sips , Eat/feed slowly, Remain upright 30-45 min     Assessment of Texture Tolerance:  Diet level prior to treatment: Dysphagia I Puree , Thin liquids   Tolerance of Current Diet Level: Pt unable to keep PO down; RN reports pt spitting up all liquids and solids; able to keep down portions of boost; < 5% of meal consumed     Impressions: Pt was positioned Upright in bed , awake and alert. Currently on room air. Trials of thin liquids and mildly (nectar) thick liquids  were provided to assess swallow function. Pt declined additional trials and had minimal liquid trials. Pt required feeding assistance from SLP. Pt presenting with no anterior, decreased oral prep and AP transit, and delayed swallow initiation. No overt signs or symptoms associated with aspiration or decline in respiratory status.  With puree and thin liquids, pt

## 2023-08-10 NOTE — CONSULTS
[facility]  Advanced CHF/Pulmonary Hypertension   Cardiac Evaluation      Chris Cortes  YOB: 1935    Requesting PHysician:  Terrie Weber      Chief Complaint   Patient presents with    Destiny Mirza ems from [facility] d/t fall that happened last night. Pt states he does not remember falling. Pt alert and oriented during triage     Dizziness     Pt states he also endorses dizziness and overall weakness upon arrival to the ER. Also had 2 episodes of emesis PTA and upon arrival. Pt [facility]        History of Present Illness:  Iveth Huitron is an 82 yo male who comes to the ED for evaluation of dizziness. He was lightheaded when standing and felt that he was going to fall. He fell the day before admission and hit his head. Symptoms improve with sitting. No chest pain or shortness of breath. He admits to nausea and vomiting. He lives at an assisted living facility. He has a history of HTN, dementia, GERD, glaucome, BPH. No fever chills. No dysphagia, melena, or hematochezia. On admission, he was found to have fecal impaction, possible UTI and aspiration pneumonia. Had upper endoscopy on 8/9 that was normal.  An echo was performed showing LVEF 40%, RVSP 44 mmHg. He has also been found to be in atrial fib with rapid rate today. We are consulted for LV dysfunction. His bNP increased from 1400 to 6500 after hydration. He is 5 liters positive after giving him lots of fluids. However, he is still not that short of breath. Heart rate \increased with afib. Blood pressure elevated on admission and throughout. Labs:  Sodium 148  K 3.4  Chloride 115  Bun 25  Cre 00.9  Bnp 6532 (1487 on admission)  Troponin 29, 22  H/H 11.5/34.9  Wbc 7.6    Echo:   Summary   Mild-to-moderately reduced global systolic function with an ejection   fraction estimated at 40%. Global hypokinesis. Definity imaging enhancing agent administered.    The right ventricle is

## 2023-08-10 NOTE — PROGRESS NOTES
[facility] Department   Phone: [phone]    Occupational Therapy    [] Initial Evaluation            [x] Daily Treatment Note         [] Discharge Summary      Patient: Elza Kessler   : 1935   MRN: [id number]   Date of Service:  8/10/2023    Admitting Diagnosis:  UTI (urinary tract infection)  Current Admission Summary:   Elza Kessler is a 81 y.o. male with history of HTN, dementia, GERD, glaucoma, BPH who came to ER from [address] [place] [address] with fall and dizziness. Patient has been constipated and has urinary frequency. Patient vomited twice. No CP, SOB, HA or abdominal pain. No fevers, chills or NS. No dysphagia, melena or hematochezia. Patient does not remember the fall. Otherwise complete ROS is negative unless listed above. Past Medical History:  has a past medical history of Acid reflux, Anxiety, Arthritis, BPH (benign prostatic hyperplasia), Constipation, Dementia ([address]), Esophagitis, Glaucoma, MRSA infection, and Neurocognitive disorder. Past Surgical History:  has a past surgical history that includes Knee cartilage surgery (Right); hernia repair (Left); Upper gastrointestinal endoscopy (N/A, 2023); and Esophagus dilation (N/A, 2023). Discharge Recommendations: Elza Kessler scored a 12/24 on the AM-PAC ADL Inpatient form. Current research shows that an AM-PAC score of 17 or less is typically not associated with a discharge to the patient's home setting. Based on the patient's AM-PAC score and their current ADL deficits, it is recommended that the patient have 3-5 sessions per week of Occupational Therapy at d/c to increase the patient's independence. Please see assessment section for further patient specific details. If patient discharges prior to next session this note will serve as a discharge summary. Please see below for the latest assessment towards goals.       DME Required For Discharge: DME to be determined pending patient

## 2023-08-10 NOTE — PLAN OF CARE
Problem: Discharge Planning  Goal: Discharge to home or other facility with appropriate resources  Outcome: Progressing     Problem: Skin/Tissue Integrity  Goal: Absence of new skin breakdown  Description: 1. Monitor for areas of redness and/or skin breakdown  2. Assess vascular access sites hourly  3. Every 4-6 hours minimum:  Change oxygen saturation probe site  4. Every 4-6 hours:  If on nasal continuous positive airway pressure, respiratory therapy assess nares and determine need for appliance change or resting period.   Outcome: Progressing     Problem: Safety - Adult  Goal: Free from fall injury  Outcome: Progressing     Problem: ABCDS Injury Assessment  Goal: Absence of physical injury  Outcome: Progressing     Problem: Respiratory - Adult  Goal: Achieves optimal ventilation and oxygenation  Outcome: Progressing     Problem: Genitourinary - Adult  Goal: Absence of urinary retention  Outcome: Progressing     Problem: Pain  Goal: Verbalizes/displays adequate comfort level or baseline comfort level  Outcome: Progressing

## 2023-08-10 NOTE — PROGRESS NOTES
Shift assessment completed. VSS, scheduled meds given/held per MAR orders. Pt is alert to self at this time but disoriented to situation, time and place. Was told he goes in and out of confusion. Pt has no complaints or needs at this time. Brakes locked, bed in lowest position, bed alarm engaged. All belongings and call light within reach.

## 2023-08-10 NOTE — CONSULTS
demonstrate no acute abnormality. SINUSES: The visualized paranasal sinuses and mastoid air cells demonstrate no acute abnormality. SOFT TISSUES/SKULL:  No acute abnormality of the visualized skull or soft tissues. No acute intracranial abnormality. CT CHEST WO CONTRAST    Result Date: 8/8/2023  EXAMINATION: CT OF THE CHEST WITHOUT CONTRAST 8/8/2023 3:07 pm TECHNIQUE: CT of the chest was performed without the administration of intravenous contrast. Multiplanar reformatted images are provided for review. Automated exposure control, iterative reconstruction, and/or weight based adjustment of the mA/kV was utilized to reduce the radiation dose to as low as reasonably achievable. COMPARISON: 01/07/2016 HISTORY: ORDERING SYSTEM PROVIDED HISTORY: pneumonia TECHNOLOGIST PROVIDED HISTORY: Reason for exam:->pneumonia Reason for Exam: pneumonia FINDINGS: Mediastinum: Atherosclerotic changes of the aorta. Negative for aneurysm. Heart size is enlarged. Mild coronary artery calcifications. Negative for pericardial effusion. Small hiatus hernia. Lungs/pleura: Bilateral pleural effusions. Right is larger than the left and it is moderate. Consolidation in the lower lobes bilaterally but much more prominent on the right. Consolidation also in the right middle lobe and the right upper lobe. Multiple air bronchograms. Ground-glass opacities at the right lung apex. Calcified apical pleural thickening bilaterally. The trachea and bronchi are patent. Upper Abdomen: Gastric wall appears to be thickened but is incompletely evaluated. Soft Tissues/Bones: Scoliosis. Moderate osteoarthritic change of the sternoclavicular joints. Mild glenohumeral arthropathy. Spondylosis. No lytic destructive lesions. 1. Diffuse right lung pneumonia. Follow-up radiographs to resolution are needed. 2. Bilateral pleural effusions.      XR CHEST PORTABLE    Result Date: 8/5/2023  EXAMINATION: ONE XRAY VIEW OF THE CHEST 8/5/2023 11:19

## 2023-08-10 NOTE — PROGRESS NOTES
Jossie Durbin the daughter does not want anything given to him that will make him sleepy/drowsy. So please try not to give anything!

## 2023-08-10 NOTE — PLAN OF CARE
Problem: Discharge Planning  Goal: Discharge to home or other facility with appropriate resources  Outcome: Progressing     Problem: Skin/Tissue Integrity  Goal: Absence of new skin breakdown  Description: 1. Monitor for areas of redness and/or skin breakdown  2. Assess vascular access sites hourly  3. Every 4-6 hours minimum:  Change oxygen saturation probe site  4. Every 4-6 hours:  If on nasal continuous positive airway pressure, respiratory therapy assess nares and determine need for appliance change or resting period.   Outcome: Progressing     Problem: Safety - Adult  Goal: Free from fall injury  Outcome: Progressing     Problem: Genitourinary - Adult  Goal: Absence of urinary retention  Outcome: Progressing     Problem: Pain  Goal: Verbalizes/displays adequate comfort level or baseline comfort level  Outcome: Progressing

## 2023-08-10 NOTE — DISCHARGE INSTR - COC
Continuity of Care Form    Patient Name: Shell Fajardo   :  1935  MRN:  [id number]    Admit date:  2023  Discharge date:  8/15/2023    Code Status Order: Limited   Advance Directives:   [address] [place] Documentation       Date/Time Healthcare Directive Type of Healthcare Directive Copy in [address] Agent's Name Healthcare Agent's Phone Number    [phone] Yes, patient has an advance directive for healthcare treatment Health care treatment directive -- -- Kateryna [facility] [phone]            Admitting Physician:  Mili Serrano MD  PCP: Jennifer Reyes MD    Discharging Nurse: [facility] Unit/Room#: 7TT-5593/9322-66  Discharging Unit Phone Number: ***    Emergency Contact:   Extended Emergency Contact Information  Primary Emergency Contact: Laine Phone: [phone]  Relation: Child    Past Surgical History:  Past Surgical History:   Procedure Laterality Date    ESOPHAGEAL DILATATION N/A 2023    ESOPHAGEAL DILATION Tiff Plank performed by Lopez Martínez MD at [address] Left     inguinal    KNEE CARTILAGE SURGERY Right     UPPER GASTROINTESTINAL ENDOSCOPY N/A 2023    EGD BIOPSY performed by Lopez Martínez MD at [address] [place] [address] ENDOSCOPY       Immunization History:   Immunization History   Administered Date(s) Administered    Influenza, FLUAD, (age 72 y+), Adjuvanted, 0.5mL 10/27/2020    Pneumococcal, PCV-13, PREVNAR 15, (age 6w+), IM, 0.5mL 2017    Pneumococcal, PPSV23, PNEUMOVAX 21, (age 2y+), SC/IM, 0.5mL 2014    TDaP, ADACEL (age 6y-58y), BOOSTRIX (age 10y+), IM, 0.5mL 2014    Tetanus 2002    Zoster Live (Zostavax) 2014       Active Problems:  Patient Active Problem List   Diagnosis Code    BPH with obstruction/lower urinary tract symptoms N40.1, N13.8    Gastroesophageal reflux disease with esophagitis K21.00    Hypertension I10    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 Insertion Date: 8/8/2023    Colostomy/Ileostomy/Ileal Conduit: No       Date of Last BM: 8/13/2023    Intake/Output Summary (Last 24 hours) at 8/10/2023 1048  Last data filed at 8/10/2023 0600  Gross per 24 hour   Intake --   Output 3000 ml   Net -3000 ml     I/O last 3 completed shifts: In: 435.8 [I.V.:100; IV Piggyback:335.8]  Out: 3450 [Urine:3450]    Safety Concerns: At Risk for Falls    Impairments/Disabilities:      None    Nutrition Therapy:  Current Nutrition Therapy:   - Oral Diet:  General    Routes of Feeding: Oral  Liquids: No Restrictions  Daily Fluid Restriction: no  Last Modified Barium Swallow with Video (Video Swallowing Test): not done    Treatments at the Time of Hospital Discharge:   Respiratory Treatments:   Oxygen Therapy:  is on oxygen at 1 L/min per nasal cannula.   Ventilator:    - No ventilator support    Rehab Therapies: Physical Therapy and Occupational Therapy  Weight Bearing Status/Restrictions: No weight bearing restrictions  Other Medical Equipment (for information only, NOT a DME order):  walker  Other Treatments: ***    Patient's personal belongings (please select all that are sent with patient):  Dentures upper and lower    RN SIGNATURE:  Electronically signed by Anshu Quintanilla RN on 8/15/23 at 2:01 PM EDT    CASE MANAGEMENT/SOCIAL WORK SECTION    Inpatient Status Date: 08/05/23    Readmission Risk Assessment Score:  Readmission Risk              Risk of Unplanned Readmission:  18           Discharging to Facility/ Agency   Name: [facility] [address]: [address] [place] [address]  Phone: [phone]  Fax: [phone]         / signature: Electronically signed by JOSUÉ Singh, LSW on 8/15/23 at 5:49 PM EDT    PHYSICIAN SECTION    Prognosis: Good    Condition at Discharge: Stable    Rehab Potential (if transferring to Rehab): Good    Recommended Labs or Other Treatments After Discharge: cbc,

## 2023-08-10 NOTE — PROGRESS NOTES
During assessment back of right knee felt lump/hard area. Not on other side. Not sure if due to fall from [facility]. Placed pillow under knees he wanted to keep them bent. Asked if he had any pain and he denied.

## 2023-08-11 ENCOUNTER — APPOINTMENT (OUTPATIENT)
Dept: ULTRASOUND IMAGING | Age: 88
DRG: 987 | End: 2023-08-11
Payer: MEDICARE

## 2023-08-11 LAB
ANION GAP SERPL CALCULATED.3IONS-SCNC: 7 MMOL/L (ref 3–16)
BUN SERPL-MCNC: 23 MG/DL (ref 7–20)
CALCIUM SERPL-MCNC: 9.2 MG/DL (ref 8.3–10.6)
CHLORIDE SERPL-SCNC: 112 MMOL/L (ref 99–110)
CO2 SERPL-SCNC: 28 MMOL/L (ref 21–32)
CREAT SERPL-MCNC: 1 MG/DL (ref 0.8–1.3)
FERRITIN SERPL IA-MCNC: 320.8 NG/ML (ref 30–400)
GFR SERPLBLD CREATININE-BSD FMLA CKD-EPI: >60 ML/MIN/{1.73_M2}
GLUCOSE SERPL-MCNC: 99 MG/DL (ref 70–99)
IRON SATN MFR SERPL: 15 % (ref 20–50)
IRON SERPL-MCNC: 31 UG/DL (ref 59–158)
MAGNESIUM SERPL-MCNC: 2.5 MG/DL (ref 1.8–2.4)
POTASSIUM SERPL-SCNC: 3.9 MMOL/L (ref 3.5–5.1)
SODIUM SERPL-SCNC: 147 MMOL/L (ref 136–145)
TIBC SERPL-MCNC: 213 UG/DL (ref 260–445)

## 2023-08-11 PROCEDURE — 83550 IRON BINDING TEST: CPT

## 2023-08-11 PROCEDURE — 6370000000 HC RX 637 (ALT 250 FOR IP): Performed by: INTERNAL MEDICINE

## 2023-08-11 PROCEDURE — 76705 ECHO EXAM OF ABDOMEN: CPT

## 2023-08-11 PROCEDURE — 1200000000 HC SEMI PRIVATE

## 2023-08-11 PROCEDURE — 99232 SBSQ HOSP IP/OBS MODERATE 35: CPT | Performed by: NURSE PRACTITIONER

## 2023-08-11 PROCEDURE — 80048 BASIC METABOLIC PNL TOTAL CA: CPT

## 2023-08-11 PROCEDURE — 2580000003 HC RX 258: Performed by: INTERNAL MEDICINE

## 2023-08-11 PROCEDURE — 2580000003 HC RX 258: Performed by: PHYSICIAN ASSISTANT

## 2023-08-11 PROCEDURE — 83735 ASSAY OF MAGNESIUM: CPT

## 2023-08-11 PROCEDURE — 82728 ASSAY OF FERRITIN: CPT

## 2023-08-11 PROCEDURE — 99233 SBSQ HOSP IP/OBS HIGH 50: CPT | Performed by: CLINICAL NURSE SPECIALIST

## 2023-08-11 PROCEDURE — 6360000002 HC RX W HCPCS: Performed by: INTERNAL MEDICINE

## 2023-08-11 PROCEDURE — 6370000000 HC RX 637 (ALT 250 FOR IP): Performed by: NURSE PRACTITIONER

## 2023-08-11 PROCEDURE — 83540 ASSAY OF IRON: CPT

## 2023-08-11 PROCEDURE — 36415 COLL VENOUS BLD VENIPUNCTURE: CPT

## 2023-08-11 RX ORDER — SODIUM CHLORIDE 450 MG/100ML
INJECTION, SOLUTION INTRAVENOUS CONTINUOUS
Status: ACTIVE | OUTPATIENT
Start: 2023-08-11 | End: 2023-08-12

## 2023-08-11 RX ORDER — POTASSIUM CHLORIDE 20 MEQ/1
20 TABLET, EXTENDED RELEASE ORAL ONCE
Status: COMPLETED | OUTPATIENT
Start: 2023-08-11 | End: 2023-08-11

## 2023-08-11 RX ORDER — METOPROLOL SUCCINATE 50 MG/1
50 TABLET, EXTENDED RELEASE ORAL DAILY
Status: DISCONTINUED | OUTPATIENT
Start: 2023-08-12 | End: 2023-08-11

## 2023-08-11 RX ADMIN — TIMOLOL MALEATE 1 DROP: 5 SOLUTION OPHTHALMIC at 22:13

## 2023-08-11 RX ADMIN — FINASTERIDE 5 MG: 5 TABLET, FILM COATED ORAL at 17:00

## 2023-08-11 RX ADMIN — METOPROLOL SUCCINATE 25 MG: 25 TABLET, EXTENDED RELEASE ORAL at 09:13

## 2023-08-11 RX ADMIN — ENOXAPARIN SODIUM 70 MG: 100 INJECTION SUBCUTANEOUS at 09:14

## 2023-08-11 RX ADMIN — METOPROLOL TARTRATE 25 MG: 25 TABLET, FILM COATED ORAL at 22:09

## 2023-08-11 RX ADMIN — PANTOPRAZOLE SODIUM 40 MG: 40 TABLET, DELAYED RELEASE ORAL at 17:00

## 2023-08-11 RX ADMIN — POTASSIUM CHLORIDE 20 MEQ: 1500 TABLET, EXTENDED RELEASE ORAL at 18:39

## 2023-08-11 RX ADMIN — TAMSULOSIN HYDROCHLORIDE 0.8 MG: 0.4 CAPSULE ORAL at 16:59

## 2023-08-11 RX ADMIN — LISINOPRIL 5 MG: 5 TABLET ORAL at 09:14

## 2023-08-11 RX ADMIN — DORZOLAMIDE HCL 1 DROP: 20 SOLUTION/ DROPS OPHTHALMIC at 09:30

## 2023-08-11 RX ADMIN — SODIUM CHLORIDE: 4.5 INJECTION, SOLUTION INTRAVENOUS at 17:32

## 2023-08-11 RX ADMIN — DORZOLAMIDE HCL 1 DROP: 20 SOLUTION/ DROPS OPHTHALMIC at 22:13

## 2023-08-11 RX ADMIN — TIMOLOL MALEATE 1 DROP: 5 SOLUTION OPHTHALMIC at 09:30

## 2023-08-11 RX ADMIN — SODIUM CHLORIDE, PRESERVATIVE FREE 10 ML: 5 INJECTION INTRAVENOUS at 09:33

## 2023-08-11 RX ADMIN — ENOXAPARIN SODIUM 70 MG: 100 INJECTION SUBCUTANEOUS at 22:09

## 2023-08-11 RX ADMIN — METOPROLOL TARTRATE 25 MG: 25 TABLET, FILM COATED ORAL at 17:00

## 2023-08-11 RX ADMIN — LATANOPROST 1 DROP: 50 SOLUTION OPHTHALMIC at 22:13

## 2023-08-11 ASSESSMENT — PAIN SCALES - GENERAL
PAINLEVEL_OUTOF10: 2
PAINLEVEL_OUTOF10: 7

## 2023-08-11 ASSESSMENT — PAIN - FUNCTIONAL ASSESSMENT: PAIN_FUNCTIONAL_ASSESSMENT: ACTIVITIES ARE NOT PREVENTED

## 2023-08-11 ASSESSMENT — PAIN SCALES - WONG BAKER: WONGBAKER_NUMERICALRESPONSE: 2

## 2023-08-11 ASSESSMENT — PAIN DESCRIPTION - DESCRIPTORS: DESCRIPTORS: ACHING;SORE

## 2023-08-11 ASSESSMENT — PAIN DESCRIPTION - ORIENTATION: ORIENTATION: UPPER

## 2023-08-11 ASSESSMENT — PAIN DESCRIPTION - LOCATION: LOCATION: BACK

## 2023-08-11 NOTE — PROGRESS NOTES
Oral Daily    amoxicillin-clavulanate  1 tablet Oral 2 times per day    escitalopram  40 mg Oral Daily    finasteride  5 mg Oral Daily    latanoprost  1 drop Both Eyes Nightly    pantoprazole  40 mg Oral BID AC    polyethylene glycol  17 g Oral Daily    tamsulosin  0.8 mg Oral Daily    sodium chloride flush  5-40 mL IntraVENous 2 times per day    dorzolamide  1 drop Both Eyes BID    timolol  1 drop Both Eyes BID      sodium chloride         Lab Data:  CBC:   Recent Labs     08/08/23  1340 08/10/23  0747   WBC 7.6 7.6   HGB 10.9* 11.5*    185     BMP:    Recent Labs     08/09/23  0610 08/10/23  0747 08/11/23  0810   * 147* 147*   K 3.4* 3.2* 3.9   CO2 21 25 28   BUN 25* 21* 23*   CREATININE 0.9 0.9 1.0     INR:  No results for input(s): INR in the last 72 hours. BNP:    Recent Labs     08/09/23  0610 08/10/23  0746   PROBNP 5,527* 6,532*         Diagnostics:  Echo 8/9/2023  Summary   Mild-to-moderately reduced global systolic function with an ejection   fraction estimated at 40%. Global hypokinesis. Definity imaging enhancing agent administered. The right ventricle is dilated and hypokinetic. Right ventricular systolic function appears to be reduced. Mild right atrial enlargement. Aortic valve appears sclerotic but opens adequately. No evidence of aortic   valve regurgitation. Mildly thickened mitral valve without evidence of stenosis. There is mild mitral regurgitation. There is moderate tricuspid regurgitation with a RVSP estimation of 44 mmHg. IVC size is dilated (>2.1 cm) and collapses < 50% with respiration   consistent with elevated RA pressure (15 mmHg). Grade I diastolic dysfunction with normal LV filling pressures. Assessment:    1. REYES  2. Hypernatremia  3. Chronic combined systolic and diastolic heart failure, on ace and BB; no aldosterone antagonist or sglt2 due to dehydration  4. New onset atrial fib  5. Possible aspiration pneumonia      Plan:     Will add iron studies and ferritin, if abnormal treat  Continue lisinopril 5 mg daily  Continue toprol  CHF nurse following for diet education, fluid restriction and daily weights  EP following  Continue lasix 20 mg po daily for a couple days     No workup for his mild LV dysfunction due to his advanced age and multiple medical problems per consult note    Discussed with hospitalist.  If sodium, does not improve will need to consider nephrology consult    NYHA IV    Discussed with patient who is agreeable with plan of care. Thank you for allowing me to participate in the care of your patient.     Zachariah Harkins, APRN - CNS, CNS

## 2023-08-11 NOTE — PROGRESS NOTES
[address] [facility]    8/11/2023 2:19 PM        Name: Ciera Parker . Admitted: 8/5/2023  Primary Care Provider: Kevon Cosme MD (Tel: [phone])      Subjective:  . Admitted with fall/ dizziness   Noted to have possible UTI/ Aspiration PNA  And fecal impaction. Had EGD Wednesday morning. Continues to have nausea-initially tells me it started the day before he came in, then said a month before. Denies abdominal pain. EGD negative for stricture. He remains in afib this am. No prior to history. Lives in Assisted living at [facility]. Daughter is very involved in his care. I spoke to daughter yesterday at length about plan of care.  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)  Reviewed interval ancillary notes    Current Medications  metoprolol tartrate (LOPRESSOR) tablet 25 mg, Q6H  potassium chloride (KLOR-CON M) extended release tablet 40 mEq, PRN   Or  potassium bicarb-citric acid (EFFER-K) effervescent tablet 40 mEq, PRN   Or  potassium chloride 10 mEq/100 mL IVPB (Peripheral Line), PRN  magnesium sulfate 2000 mg in 50 mL IVPB premix, PRN  enoxaparin (LOVENOX) injection 70 mg, BID  lisinopril (PRINIVIL;ZESTRIL) tablet 5 mg, Daily  furosemide (LASIX) tablet 20 mg, Daily  traMADol (ULTRAM) tablet 50 mg, Q6H PRN  amoxicillin-clavulanate (AUGMENTIN) 875-125 MG per tablet 1 tablet, 2 times per day  sennosides-docusate sodium (SENOKOT-S) 8.6-50 MG tablet 2 tablet, Daily PRN  hydrALAZINE (APRESOLINE) injection 10 mg, Q4H PRN  labetalol (NORMODYNE;TRANDATE) injection 10 mg, Q4H PRN  benzocaine-menthol (CEPACOL SORE THROAT) lozenge 1 lozenge, Q2H PRN  aluminum & magnesium hydroxide-simethicone (MAALOX) 200-200-20 MG/5ML suspension 30 mL, Q6H PRN  benzonatate (TESSALON) capsule 100 mg, TID PRN  bisacodyl (DULCOLAX) EC tablet 5 mg, Daily PRN  escitalopram (LEXAPRO)

## 2023-08-11 NOTE — PROGRESS NOTES
Pharmacy to check patient copay for Eliquis    Copay for patient will be: $0 /month. Pharmacy will continue to follow the decision on therapy and  the patient if appropriate.        Marina Galarza, PharmD  PGY-1 Pharmacy Resident  [id number]

## 2023-08-11 NOTE — PROGRESS NOTES
Nutrition Note    RECOMMENDATIONS  PO Diet: NPO; advance as tolerated  ONS: start Ensure TID once diet advances     NUTRITION ASSESSMENT   Pt is malnourished AEB inadequate oral intake, wt loss & muscle/fat tissue wasting identified during NFPE. Pt reports has lost appetite with episodes of nausea, resulting in wt loss. States wt fluctuates between 154-160 lb typically, & wt 1 year ago was 166 lb; unable to verify this in hx. Pt is NPO for ultrasound of gall bladder. Pt likes Ensure & agrees to receive TID once diet resumes. Will monitor for diet tolerance & adequate intake. Nutrition Related Findings: Lasix; -5.8L; trace BLE edema; + nausea;  Na 147  Wounds: None  Nutrition Education:  Education not indicated   Nutrition Goals: PO intake 50% or greater, Initiate PO diet     MALNUTRITION ASSESSMENT   Chronic Illness  Malnutrition Status: Severe malnutrition  Findings of the 6 clinical characteristics of malnutrition:  Energy Intake:  75% or less estimated energy requirements for 1 month or longer  Weight Loss:  Mild weight loss (specify amount and time period)     Body Fat Loss:  Severe body fat loss Orbital, Buccal region, Fat Overlying Ribs   Muscle Mass Loss:  Severe muscle mass loss Temples (temporalis), Clavicles (pectoralis & deltoids)  Fluid Accumulation:  No significant fluid accumulation     Strength:  Not Performed      NUTRITION DIAGNOSIS   Severe malnutrition related to inadequate protein-energy intake as evidenced by nausea, poor intake prior to admission, Criteria as identified in malnutrition assessment      CURRENT NUTRITION THERAPIES  Diet NPO     PO Intake: NPO   PO Supplement Intake:NPO    ANTHROPOMETRICS  Current Height: 5' 11\" (180.3 cm)  Current Weight - Scale: 150 lb 11.2 oz (68.4 kg)    Ideal Body Weight (IBW): 172 lbs  (78 kg)    Usual Bodyweight 166 lb (75.3 kg) (154-160 lb recently)       BMI: 20.9      COMPARATIVE STANDARDS  Total Energy Requirements (kcals/day): 1700- 2040

## 2023-08-11 NOTE — PROGRESS NOTES
for Atrial Fibrillation Stroke Risk   Risk   Factors  Component Value   C CHF Yes 1   H HTN Yes 1   A2 Age >= 76 Yes,  (81 y.o.) 2   D DM No 0   S2 Prior Stroke/TIA No 0   V Vascular Disease No 0   A Age 77-78 No,  (81 y.o.) 0   Sc Sex male 0    [id number]  Score  4   Score last updated 8/11/23 11:06 AM EDT      Chronic systolic and Diastolic Heart Failure  - neg 1050 fluid balance 24H  - EF- 40%, prior EF- 60% (11/2023)  - Pro-BNP- 6532  - no prior ischemic work up  - on BB, ACE, Lasix   - Monitor I&Os  - Daily Weights  - HF following     Hypertension  - Goal <140/80  - increase BB  - continue Lisinopril, Lasix     Hypernatremia  - Na- 147- 147- 148   - Salt/fluid restriction for CHF   - Hopsitalist following     Dysphagia  - EGD 8/10- dialated distal esophageal ring   - on PPI  - SLP and GI following     Nausea/Constipation  - improved nausea this morning  - NPO except meds  - ultrasound of gallbladder today  - on Miralax    Multiple medical conditions with risk of decompensation. Allergies: Allergies   Allergen Reactions    Codeine      \"LOCKED BOWELS\"    Promethazine Hcl     Compazine [Prochlorperazine Maleate] Anxiety    Valium [Diazepam] Anxiety     Extreme anxiety; pt states he believed he had too strong a dose in the past and that was what caused his anxiety. Pt states \"they tell me I'm allergic to this but I don't think I am\"       Home Meds:  Prior to Visit Medications    Medication Sig Taking?  Authorizing Provider   benzonatate (TESSALON) 100 MG capsule Take 1 capsule by mouth 3 times daily as needed for Cough Yes Historical Provider, MD   escitalopram (LEXAPRO) 20 MG tablet Take 2 tablets by mouth daily Yes Historical Provider, MD   bisacodyl (DULCOLAX) 5 MG EC tablet Take 1 tablet by mouth daily as needed for Constipation Yes Historical Provider, MD   Calcium Carbonate Antacid (CALCIUM CARBONATE PO) Take 1 tablet by mouth daily Yes Historical Provider, MD   Colloidal Oatmeal (EUCERIN BABY ECZEMA function parameters were normal for the patient&'s age. Automated functional imaging was performed and the global LV  longitudinal peak systolic strain was -85%. - Aortic valve: Trivial regurgitation.  - Right ventricle: Systolic function was normal by objective  interpretation. TAPSE: 2.5cm. Tricuspid annular systolic velocity:  55VC/C. Automated functional imaging was performed and a global RV  longitudinal peak systolic strain value of -25% was noted  - Pulmonary arteries: Systolic pressure was at the upper limits of  normal, in the range of 30mm Hg to 35mm Hg assuming that the right  atrial pressure was 0-5 mmHg.     Stress Test: none    Cath: none     Problem List:   Patient Active Problem List    Diagnosis Date Noted    Glaucoma 02/16/2023    Refused influenza vaccine 02/16/2023    General weakness 02/16/2023    Hypernatremia 02/17/2022    Hypokalemia 02/17/2022    Severe protein-calorie malnutrition ([address]) 02/17/2022    Failure to thrive in adult 02/17/2022    Age-related macular degeneration 44/30/7113    Systolic and diastolic CHF, chronic ([address]) 08/10/2023    Hypervolemia 08/10/2023    UTI (urinary tract infection) 08/05/2023    REYES (acute kidney injury) ([address]) 08/05/2023    Bilateral lower extremity edema 36/11/7901    Complicated UTI (urinary tract infection) 08/05/2023    Other secondary kyphosis, 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    Gait difficulty 07/20/2021    Cardiac arrhythmia 09/04/2019    Chronic midline low back pain without sciatica 06/24/2019    Frailty syndrome in geriatric patient 02/21/2019    Urinary incontinence 02/04/2019    Chronic idiopathic constipation 02/04/2019    Late onset Alzheimer's disease without behavioral disturbance ([address]) 04/10/2017    Primary open angle glaucoma of both eyes, mild stage 07/18/2016    S/P TURP (status post transurethral resection of prostate) 07/18/2016    Intrinsic eczema 07/18/2016    Essential hypertension, benign 11/20/2013    Terminal esophageal web 06/18/2013

## 2023-08-11 NOTE — PLAN OF CARE
Problem: Discharge Planning  Goal: Discharge to home or other facility with appropriate resources  8/11/2023 1323 by Angeles Zhong RN  Outcome: Not Progressing     Problem: Genitourinary - Adult  Goal: Absence of urinary retention  8/11/2023 1323 by Angeles Zhong RN  Outcome: Not Progressing     Problem: Nutrition Deficit:  Goal: Optimize nutritional status  Outcome: Not Progressing     Problem: Skin/Tissue Integrity  Goal: Absence of new skin breakdown  Description: 1. Monitor for areas of redness and/or skin breakdown  2. Assess vascular access sites hourly  3. Every 4-6 hours minimum:  Change oxygen saturation probe site  4. Every 4-6 hours:  If on nasal continuous positive airway pressure, respiratory therapy assess nares and determine need for appliance change or resting period.   8/11/2023 1323 by Angeles Zhong RN  Outcome: Progressing     Problem: Safety - Adult  Goal: Free from fall injury  8/11/2023 1323 by Angeles Zhong RN  Outcome: Progressing     Problem: ABCDS Injury Assessment  Goal: Absence of physical injury  8/11/2023 1323 by Angeles Zhong RN  Outcome: Progressing     Problem: Neurosensory - Adult  Goal: Achieves stable or improved neurological status  Outcome: Progressing     Problem: Neurosensory - Adult  Goal: Absence of seizures  Outcome: Progressing     Problem: Neurosensory - Adult  Goal: Remains free of injury related to seizures activity  Outcome: Progressing     Problem: Respiratory - Adult  Goal: Achieves optimal ventilation and oxygenation  8/11/2023 1323 by Angeles Zhong RN  Outcome: Progressing     Problem: Skin/Tissue Integrity - Adult  Goal: Skin integrity remains intact  8/11/2023 1323 by Angeles Zhong RN  Outcome: Progressing     Problem: Musculoskeletal - Adult  Goal: Return mobility to safest level of function  8/11/2023 1323 by Angeles Zhong RN  Outcome: Progressing     Problem: Musculoskeletal - Adult  Goal: Return ADL status to a

## 2023-08-11 NOTE — PROGRESS NOTES
SLP NOTES        Attempt  Dominga Urbina  1935    SLP attempted to complete dysphagia treatment follow up. Pt's chart reviewed and consultation completed with pt's RN. Pt's RN reports that the pt is currently NPO to complete an ultrasound of his gallbladder. Will re-attempt as schedule allows and pt appropriate. Elina Granados M.S. Adeline #SP. [address] [facility]

## 2023-08-11 NOTE — PLAN OF CARE
Problem: Discharge Planning  Goal: Discharge to home or other facility with appropriate resources  8/11/2023 0124 by Joy Gerard RN  Outcome: Progressing  8/10/2023 1439 by Tiffany Sevilla RN  Outcome: Progressing     Problem: Skin/Tissue Integrity  Goal: Absence of new skin breakdown  Description: 1. Monitor for areas of redness and/or skin breakdown  2. Assess vascular access sites hourly  3. Every 4-6 hours minimum:  Change oxygen saturation probe site  4. Every 4-6 hours:  If on nasal continuous positive airway pressure, respiratory therapy assess nares and determine need for appliance change or resting period.   8/11/2023 0124 by Joy Gerard RN  Outcome: Progressing  8/10/2023 1439 by Tiffany Sevilla RN  Outcome: Progressing     Problem: Safety - Adult  Goal: Free from fall injury  8/11/2023 0124 by Joy Gerard RN  Outcome: Progressing  8/10/2023 1439 by Tiffany Sevilla RN  Outcome: Progressing     Problem: ABCDS Injury Assessment  Goal: Absence of physical injury  8/11/2023 0124 by Joy Gerard RN  Outcome: Progressing  8/10/2023 1439 by Tiffany Sevilla RN  Outcome: Progressing     Problem: Neurosensory - Adult  Goal: Achieves stable or improved neurological status  8/10/2023 1439 by Tiffany Sevilla RN  Outcome: Progressing  Goal: Absence of seizures  8/10/2023 1439 by Tiffany Sevilla RN  Outcome: Progressing  Goal: Remains free of injury related to seizures activity  8/10/2023 1439 by Tiffany Sevilla RN  Outcome: Progressing     Problem: Respiratory - Adult  Goal: Achieves optimal ventilation and oxygenation  8/11/2023 0124 by Joy Gerard RN  Outcome: Progressing  8/10/2023 1439 by Tiffany Sevilla RN  Outcome: Progressing     Problem: Skin/Tissue Integrity - Adult  Goal: Skin integrity remains intact  8/11/2023 0124 by Joy Gerard RN  Outcome: Progressing  8/10/2023 1439 by Tiffany Sevilla RN  Outcome: Progressing  Goal: Oral mucous membranes remain

## 2023-08-12 PROBLEM — K80.10 CALCULUS OF GALLBLADDER WITH CHRONIC CHOLECYSTITIS WITHOUT OBSTRUCTION: Status: ACTIVE | Noted: 2023-08-12

## 2023-08-12 PROBLEM — I50.42 CHRONIC COMBINED SYSTOLIC AND DIASTOLIC CONGESTIVE HEART FAILURE (HCC): Status: ACTIVE | Noted: 2023-08-12

## 2023-08-12 LAB
ANION GAP SERPL CALCULATED.3IONS-SCNC: 9 MMOL/L (ref 3–16)
BUN SERPL-MCNC: 29 MG/DL (ref 7–20)
CALCIUM SERPL-MCNC: 9.4 MG/DL (ref 8.3–10.6)
CHLORIDE SERPL-SCNC: 115 MMOL/L (ref 99–110)
CO2 SERPL-SCNC: 26 MMOL/L (ref 21–32)
CREAT SERPL-MCNC: 1.1 MG/DL (ref 0.8–1.3)
GFR SERPLBLD CREATININE-BSD FMLA CKD-EPI: >60 ML/MIN/{1.73_M2}
GLUCOSE SERPL-MCNC: 106 MG/DL (ref 70–99)
POTASSIUM SERPL-SCNC: 4 MMOL/L (ref 3.5–5.1)
SODIUM SERPL-SCNC: 150 MMOL/L (ref 136–145)

## 2023-08-12 PROCEDURE — 36415 COLL VENOUS BLD VENIPUNCTURE: CPT

## 2023-08-12 PROCEDURE — 6360000002 HC RX W HCPCS: Performed by: NURSE PRACTITIONER

## 2023-08-12 PROCEDURE — 80048 BASIC METABOLIC PNL TOTAL CA: CPT

## 2023-08-12 PROCEDURE — 99222 1ST HOSP IP/OBS MODERATE 55: CPT | Performed by: SURGERY

## 2023-08-12 PROCEDURE — 6360000002 HC RX W HCPCS: Performed by: INTERNAL MEDICINE

## 2023-08-12 PROCEDURE — 6370000000 HC RX 637 (ALT 250 FOR IP): Performed by: INTERNAL MEDICINE

## 2023-08-12 PROCEDURE — 6370000000 HC RX 637 (ALT 250 FOR IP): Performed by: NURSE PRACTITIONER

## 2023-08-12 PROCEDURE — 2580000003 HC RX 258: Performed by: PHYSICIAN ASSISTANT

## 2023-08-12 PROCEDURE — 1200000000 HC SEMI PRIVATE

## 2023-08-12 PROCEDURE — 6370000000 HC RX 637 (ALT 250 FOR IP): Performed by: PHYSICIAN ASSISTANT

## 2023-08-12 PROCEDURE — 99233 SBSQ HOSP IP/OBS HIGH 50: CPT | Performed by: NURSE PRACTITIONER

## 2023-08-12 RX ORDER — LISINOPRIL 10 MG/1
10 TABLET ORAL DAILY
Status: DISCONTINUED | OUTPATIENT
Start: 2023-08-12 | End: 2023-08-13

## 2023-08-12 RX ORDER — ENEMA 19; 7 G/133ML; G/133ML
1 ENEMA RECTAL ONCE
Status: DISCONTINUED | OUTPATIENT
Start: 2023-08-12 | End: 2023-08-15 | Stop reason: HOSPADM

## 2023-08-12 RX ORDER — DEXTROSE MONOHYDRATE 50 MG/ML
INJECTION, SOLUTION INTRAVENOUS CONTINUOUS
Status: DISCONTINUED | OUTPATIENT
Start: 2023-08-12 | End: 2023-08-15 | Stop reason: HOSPADM

## 2023-08-12 RX ADMIN — HYDRALAZINE HYDROCHLORIDE 10 MG: 20 INJECTION INTRAMUSCULAR; INTRAVENOUS at 06:12

## 2023-08-12 RX ADMIN — ACETAMINOPHEN 650 MG: 325 TABLET ORAL at 09:02

## 2023-08-12 RX ADMIN — DEXTROSE MONOHYDRATE: 50 INJECTION, SOLUTION INTRAVENOUS at 07:00

## 2023-08-12 RX ADMIN — PANTOPRAZOLE SODIUM 40 MG: 40 TABLET, DELAYED RELEASE ORAL at 17:41

## 2023-08-12 RX ADMIN — TIMOLOL MALEATE 1 DROP: 5 SOLUTION OPHTHALMIC at 21:13

## 2023-08-12 RX ADMIN — FINASTERIDE 5 MG: 5 TABLET, FILM COATED ORAL at 08:55

## 2023-08-12 RX ADMIN — PANTOPRAZOLE SODIUM 40 MG: 40 TABLET, DELAYED RELEASE ORAL at 05:57

## 2023-08-12 RX ADMIN — POLYETHYLENE GLYCOL 3350 17 G: 17 POWDER, FOR SOLUTION ORAL at 08:56

## 2023-08-12 RX ADMIN — DEXTROSE MONOHYDRATE: 50 INJECTION, SOLUTION INTRAVENOUS at 07:03

## 2023-08-12 RX ADMIN — DORZOLAMIDE HCL 1 DROP: 20 SOLUTION/ DROPS OPHTHALMIC at 09:02

## 2023-08-12 RX ADMIN — METOPROLOL TARTRATE 25 MG: 25 TABLET, FILM COATED ORAL at 08:55

## 2023-08-12 RX ADMIN — ESCITALOPRAM OXALATE 40 MG: 10 TABLET ORAL at 08:55

## 2023-08-12 RX ADMIN — METOPROLOL TARTRATE 25 MG: 25 TABLET, FILM COATED ORAL at 21:13

## 2023-08-12 RX ADMIN — METOPROLOL TARTRATE 25 MG: 25 TABLET, FILM COATED ORAL at 05:57

## 2023-08-12 RX ADMIN — LISINOPRIL 10 MG: 10 TABLET ORAL at 08:55

## 2023-08-12 RX ADMIN — FUROSEMIDE 20 MG: 20 TABLET ORAL at 08:55

## 2023-08-12 RX ADMIN — LATANOPROST 1 DROP: 50 SOLUTION OPHTHALMIC at 21:13

## 2023-08-12 RX ADMIN — ENOXAPARIN SODIUM 70 MG: 100 INJECTION SUBCUTANEOUS at 08:56

## 2023-08-12 RX ADMIN — DORZOLAMIDE HCL 1 DROP: 20 SOLUTION/ DROPS OPHTHALMIC at 21:13

## 2023-08-12 RX ADMIN — TAMSULOSIN HYDROCHLORIDE 0.8 MG: 0.4 CAPSULE ORAL at 08:55

## 2023-08-12 RX ADMIN — METOPROLOL TARTRATE 25 MG: 25 TABLET, FILM COATED ORAL at 17:41

## 2023-08-12 RX ADMIN — TIMOLOL MALEATE 1 DROP: 5 SOLUTION OPHTHALMIC at 09:02

## 2023-08-12 RX ADMIN — ENOXAPARIN SODIUM 70 MG: 100 INJECTION SUBCUTANEOUS at 21:14

## 2023-08-12 ASSESSMENT — PAIN DESCRIPTION - DESCRIPTORS
DESCRIPTORS: ACHING
DESCRIPTORS: ACHING;DULL;DISCOMFORT

## 2023-08-12 ASSESSMENT — PAIN DESCRIPTION - PAIN TYPE: TYPE: ACUTE PAIN

## 2023-08-12 ASSESSMENT — PAIN DESCRIPTION - FREQUENCY: FREQUENCY: INTERMITTENT

## 2023-08-12 ASSESSMENT — PAIN DESCRIPTION - ONSET: ONSET: ON-GOING

## 2023-08-12 ASSESSMENT — PAIN DESCRIPTION - LOCATION
LOCATION: KNEE;BACK
LOCATION: BACK

## 2023-08-12 ASSESSMENT — PAIN SCALES - GENERAL
PAINLEVEL_OUTOF10: 0
PAINLEVEL_OUTOF10: 8
PAINLEVEL_OUTOF10: 6

## 2023-08-12 ASSESSMENT — PAIN - FUNCTIONAL ASSESSMENT: PAIN_FUNCTIONAL_ASSESSMENT: ACTIVITIES ARE NOT PREVENTED

## 2023-08-12 ASSESSMENT — PAIN DESCRIPTION - ORIENTATION: ORIENTATION: LOWER

## 2023-08-12 NOTE — PLAN OF CARE
Problem: Discharge Planning  Goal: Discharge to home or other facility with appropriate resources  8/12/2023 0019 by Lamar Tucker RN  Outcome: Progressing     Problem: Skin/Tissue Integrity  Goal: Absence of new skin breakdown  Description: 1. Monitor for areas of redness and/or skin breakdown  2. Assess vascular access sites hourly  3. Every 4-6 hours minimum:  Change oxygen saturation probe site  4. Every 4-6 hours:  If on nasal continuous positive airway pressure, respiratory therapy assess nares and determine need for appliance change or resting period.   8/12/2023 0019 by Lamar Tucker RN  Outcome: Progressing     Problem: ABCDS Injury Assessment  Goal: Absence of physical injury  8/12/2023 0019 by Lamar Tucker RN  Outcome: Progressing     Problem: Respiratory - Adult  Goal: Achieves optimal ventilation and oxygenation  8/12/2023 0019 by Lamar Tucker RN  Outcome: Progressing     Problem: Pain  Goal: Verbalizes/displays adequate comfort level or baseline comfort level  8/12/2023 0019 by Lamar Tucker RN  Outcome: Progressing     Problem: Discharge Planning  Goal: Discharge to home or other facility with appropriate resources  8/12/2023 0019 by Lamar Tucker RN  Outcome: Progressing  8/11/2023 1323 by Chuckie Aguirre RN  Outcome: Not Progressing     Problem: Genitourinary - Adult  Goal: Absence of urinary retention  8/11/2023 1323 by Chuckie Aguirre RN  Outcome: Not Progressing     Problem: Nutrition Deficit:  Goal: Optimize nutritional status  8/11/2023 1323 by Chuckie Aguirre RN  Outcome: Not Progressing

## 2023-08-12 NOTE — PROGRESS NOTES
[address] [facility]    8/12/2023 8:04 AM        Name: Jackelyn Smith . Admitted: 8/5/2023  Primary Care Provider: Queen Snehal MD (Tel: [phone])      Subjective:  . Admitted with fall/ dizziness   Noted to have possible UTI/ Aspiration PNA  And fecal impaction. Had EGD Wednesday morning. Continues to have nausea-tolerates boost. Denies abdominal pain. EGD negative for stricture. Has converted to NSR. Lives in Assisted living at [facility]. Daughter is very involved in his care. I spoke to daughter Thursday at length about plan of care.  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)  Reviewed interval ancillary notes    Current Medications  dextrose 5 % solution, Continuous  lisinopril (PRINIVIL;ZESTRIL) tablet 10 mg, Daily  metoprolol tartrate (LOPRESSOR) tablet 25 mg, Q6H  potassium chloride (KLOR-CON M) extended release tablet 40 mEq, PRN   Or  potassium bicarb-citric acid (EFFER-K) effervescent tablet 40 mEq, PRN   Or  potassium chloride 10 mEq/100 mL IVPB (Peripheral Line), PRN  magnesium sulfate 2000 mg in 50 mL IVPB premix, PRN  enoxaparin (LOVENOX) injection 70 mg, BID  furosemide (LASIX) tablet 20 mg, Daily  traMADol (ULTRAM) tablet 50 mg, Q6H PRN  sennosides-docusate sodium (SENOKOT-S) 8.6-50 MG tablet 2 tablet, Daily PRN  hydrALAZINE (APRESOLINE) injection 10 mg, Q4H PRN  labetalol (NORMODYNE;TRANDATE) injection 10 mg, Q4H PRN  benzocaine-menthol (CEPACOL SORE THROAT) lozenge 1 lozenge, Q2H PRN  aluminum & magnesium hydroxide-simethicone (MAALOX) 200-200-20 MG/5ML suspension 30 mL, Q6H PRN  benzonatate (TESSALON) capsule 100 mg, TID PRN  bisacodyl (DULCOLAX) EC tablet 5 mg, Daily PRN  escitalopram (LEXAPRO) tablet 40 mg, Daily  finasteride (PROSCAR) tablet 5 mg, Daily  latanoprost (XALATAN) 0.005 % ophthalmic solution 1 drop, Nightly  melatonin tablet WBC 7.6   HGB 11.5*          BMP:    Recent Labs     08/10/23  0747 08/11/23  0810 08/12/23  0427   * 147* 150*   K 3.2* 3.9 4.0   * 112* 115*   CO2 25 28 26   BUN 21* 23* 29*   CREATININE 0.9 1.0 1.1   GLUCOSE 122* 99 106*       Hepatic:   No results for input(s): AST, ALT, ALB, BILITOT, ALKPHOS in the last 72 hours. Urine culture:    Routine <50,000 CFU/ml mixed skin/urogenital dorothy. No further workup      CT head:  No acute intracranial abnormality. CT abd pelvis :  1. Patchy consolidation at the right lung base is suspicious for pneumonia. 2. Cholelithiasis. 3. Large amount of stool in the rectum. 4. Status post left inguinal hernia repair with recurrent small-moderate  fat-containing hernia extending into the scrotum. Small fat-containing right  inguinal hernia. Chest xray:  No radiographic evidence of acute pulmonary disease. CT chest  1. Diffuse right lung pneumonia. Follow-up radiographs to resolution are  needed. 2. Bilateral pleural effusions. Problem List  Principal Problem:    UTI (urinary tract infection)  Active Problems:    Severe malnutrition (HCC)    BPH with obstruction/lower urinary tract symptoms    Essential hypertension, benign    Late onset Alzheimer's disease without behavioral disturbance (HCC)    REYES (acute kidney injury) ([address])    Bilateral lower extremity edema    Complicated UTI (urinary tract infection)    Chronic combined systolic and diastolic heart failure (HCC)    Hypervolemia  Resolved Problems:    * No resolved hospital problems. *       Assessment & Plan:   Suspected aspiration PNA: now on augmentin. Continue speech therapy, aspiration precautions. Today is his last day of abx. Afib with RVR-new diagnosis. EP on board. Currently on metoprolol 25 mg q 6 hours. Possible cardioversion with STEPHIE on Monday. 3.   Persistent nausea-has gallstones but no abdominal pain. RUQ negative for cholecystitis. Will consult surgery to make sure this

## 2023-08-12 NOTE — PROGRESS NOTES
Urology Progress Note  [facility]     Patient: Dionna Sevilla MRN: [id number]  Room/Bed: 4Q-9425/9707-89   YOB: 1935  Age/Sex: 81 y.o.male  Admission Date: 8/5/2023     Date of Service:  8/12/2023    ASSESSMENT/PLAN     1. REYES (acute kidney injury) ([address])    2. Dehydration    3. Nausea and vomiting, unspecified vomiting type    4. Urinary tract infection without hematuria, site unspecified    5. Dysphagia, unspecified type        Admitted with suspected aspiration PNA  UTI per UA although urine cultures showing negative  Urinary Retention on proscar and flomax, normal size prostate gland  -Had a lou, failed his voiding trial, then had a traumatic straight cath, now has a coude  -Urine is draining CYU in the lou.   -Large stool burden noted on CT     Recommendations:  Continue Proscar and Flomax  Continue lou x1 week- voiding trial in SNF- ANY updated - lou draining CYU  Outpatient follow up if failed voiding trial to consider cystoscopy    All patient questions were answered. He understands the plan as listed above. SUBJECTIVE     Chief Complaint:   Chief Complaint   Patient presents with    Sara Wallis ems from [facility] d/t fall that happened last night. Pt states he does not remember falling. Pt alert and oriented during triage     Dizziness     Pt states he also endorses dizziness and overall weakness upon arrival to the ER. Also had 2 episodes of emesis PTA and upon arrival. Pt DNRCC       24 Hour Events: Today patient reports no new concerns. Otherwise symptoms are overall improved and pain is adequately controlled. Denies nausea/vomiting. No other genitourinary symptoms.     OBJECTIVE     Hospital Problem List:  Principal Problem:    UTI (urinary tract infection)  Active Problems:    Severe malnutrition (HCC)    BPH with obstruction/lower urinary tract symptoms    Essential hypertension, benign    Late onset Alzheimer's disease without SUPINE XRAY VIEW(S) OF THE ABDOMEN 8/10/2023 11:39 am COMPARISON: 08/05/2023 CT abdomen/pelvis HISTORY: ORDERING SYSTEM PROVIDED HISTORY: nausea and vomiting TECHNOLOGIST PROVIDED HISTORY: Reason for exam:->nausea and vomiting Reason for Exam: nausea, vomiting FINDINGS: Gas-filled loops of colon are seen in the left hemiabdomen without abnormal dilation. No definite dilation of small bowel loops. No pneumoperitoneum within limitations of supine technique. Redemonstrated dextroscoliosis of the spine. Gas-filled loops of colon are seen in the left hemiabdomen without abnormal dilation. No definitively dilated loops of small bowel are identified. Consider repeat imaging if symptoms persist.     CT Head W/O Contrast    Result Date: 8/5/2023  EXAMINATION: CT OF THE HEAD WITHOUT CONTRAST  8/5/2023 11:23 am TECHNIQUE: CT of the head was performed without the administration of intravenous contrast. Automated exposure control, iterative reconstruction, and/or weight based adjustment of the mA/kV was utilized to reduce the radiation dose to as low as reasonably achievable. COMPARISON: None. HISTORY: ORDERING SYSTEM PROVIDED HISTORY: trauma TECHNOLOGIST PROVIDED HISTORY: Reason for exam:->trauma Has a \"code stroke\" or \"stroke alert\" been called? ->No Decision Support Exception - unselect if not a suspected or confirmed emergency medical condition->Emergency Medical Condition (MA) Reason for Exam: Trauma, Fall ([place] ems from [facility] Assignment Editor [facility] d/t fall that happened last night. Pt states he does not remember falling. Pt alert and oriented during triage ). Dizziness (Pt states he also endorses dizziness and overall weakness upon arrival to the ER. Also had 2 episodes of emesis PTA and upon arrival. Pt [facility] FINDINGS: BRAIN/VENTRICLES: There is no acute intracranial hemorrhage, mass effect or midline shift. No abnormal extra-axial fluid collection.   The gray-white differentiation is maintained without

## 2023-08-12 NOTE — CONSULTS
MD cR Crowder MD Olympia Piety, MD                                  Office: [phone]                 Fax: [phone]          [employer]                     NEPHROLOGY CONSULTATION NOTE:     PATIENT NAME: Ciera Berkowitz  : 1935  MRN: [id number]      Name:  Ciera Berkowitz Date/Time of Admission: 2023 10:53 AM    CSN: [id number] Attending Provider: Leticia Bond MD   Room/Bed: [address] [place], [place] 431134/0219-61 : 1935 Age: 81 y.o. Reason for Nephrology consult :  Evaluation of patient with worsening creatinine and elevated electrolytes. History of Presenting complaint:       Ciera Berkowitz 81 y.o. Has been admitted with multiple complaints of generalized weakness nausea and vomiting. Surgical service evaluating. Possible cholecystitis. Appreciate assistance from urology team with catheter placement. Borderline hypotension. Urine output improving after IV    Nephrology consult for evaluation of creatinine of 1.1 and a BUN of 29. Noted to have worsening electrolyte imbalance. - Sodium is up to 150. - Hypokalemia has improved after replacement. WBC count is 7.6. Medications reviewed and appropriate. Medications reviewed. Medical records reviewed. Past Medical History :         Past Medical History:   Diagnosis Date    Acid reflux     Anxiety     Arthritis     BPH (benign prostatic hyperplasia)     Constipation     Dementia (HCC)     Esophagitis     Glaucoma     MRSA infection 16    urine    Neurocognitive disorder        Medications  Which i have  Reviewed, also have reviewed home medications  Prior to Admission medications    Medication Sig Start Date End Date Taking?  Authorizing Provider   benzonatate (TESSALON) 100 MG capsule Take 1 capsule by mouth 3 times daily as needed for Cough   Yes Historical Provider, MD   escitalopram (LEXAPRO) 20 MG tablet Take 2 tablets by mouth daily   Yes Historical output. Hypotension. Hypovolemia. Multiple electrolyte imbalance. Hypernatremia. Hypokalemia. Nausea and vomiting. Possible cholecystitis. PLAN       Acute kidney injury-mild to moderate-with decreased urine output.  - BUN is 29 and a creatinine of 1.0.  - Etiology multifactorial.  - Likely prerenal from volume depletion.  - Has been having bladder outlet obstruction-appreciate assistance from urology. Multiple electrolyte imbalance. Hypokalemia-improved with replacement. Hypernatremia-due to volume hypovolemia and free water deficiency. - Continue IV fluids D5W at 50 mL/h.  - Monitor closely for fluid overload. Hypotension-improving.  - May have to keep lisinopril on hold. -.  Hypovolemia-continue the IV fluids. Medications reviewed. All nephrotoxic medications have been discontinued. Hold Ace inhibitors till renal recovery is complete. Antibiotic doses are reviewed and appropriate. Work up for acute renal failure has been ordered which include:  Renal Panel study  CBC  Urine Analysis   Urine Electrolytes   U Protein : creatinine ratio  Urine for eosinophil smear exam.    Renal Ultrasound Scan     Daily weight   Strict I and O       Electrolytes reviewed. Monitor urine output and report if less than 50ml/hr. Renal functions and electrolytes need close monitoring due toWorsening Kidney function. Repeat chest xray in the morning. Treatment Plan discussed withtreating team and RN. Treatment plan discussed with patient., High risk. , and critically ill -time spent 35 mins. Thanks for the consult             Electronically Signed:  Mariano Mccall MD 8/12/2023          Arterial Blood Gasses  No results for input(s): PH, PCO2, PO2 in the last 72 hours.     Invalid input(s): Sam Mcneal    UA:No results for input(s): NITRITE, COLORU, PHUR, LABCAST, WBCUA, RBCUA, MUCUS, TRICHOMONAS, YEAST, BACTERIA, CLARITYU, SPECGRAV, LEUKOCYTESUR, [address], BILIRUBINUR,

## 2023-08-12 NOTE — PROGRESS NOTES
[address] [facility]   Cardiology Progress Note     Date: 8/12/2023  Admit Date: 8/5/2023     Reason for consultation:     Chief Complaint   Patient presents with    Devra Osler ems from [facility] d/t fall that happened last night. Pt states he does not remember falling. Pt alert and oriented during triage     Dizziness     Pt states he also endorses dizziness and overall weakness upon arrival to the ER. Also had 2 episodes of emesis PTA and upon arrival. Pt [facility]       History of Present Illness: History obtained from patient and medical record. Pelon Steen is a 81 y.o. male who comes to the ED for evaluation of dizziness. He was lightheaded when standing and felt that he was going to fall. He fell the day before admission and hit his head. Symptoms improve with sitting. No chest pain or shortness of breath. He admits to nausea and vomiting. He lives at an assisted living facility. He has a history of HTN, dementia, GERD, glaucome, BPH. No fever chills. No dysphagia, melena, or hematochezia. On admission, he was found to have fecal impaction, possible UTI and aspiration pneumonia. Had upper endoscopy on 8/9 that was normal.  An echo was performed showing LVEF 40%, RVSP 44 mmHg. He has also been found to be in atrial fib with rapid rate today. We are consulted for LV dysfunction. His bNP increased from 1400 to 6500 after hydration. He is 5 liters positive after giving him lots of fluids. However, he is still not that short of breath. Heart rate \increased with afib. Blood pressure elevated on admission and throughout. (per consult note)    Interval Hx: Today, he is feeling nauseated. Has discomfort from throat down to epigastric area. Thinks it worsens with eating. Still with sob. Patient seen and examined. Clinical notes reviewed. Telemetry reviewed. No new complaints today. No major events overnight.    Denies having chest pain, palpitations,

## 2023-08-12 NOTE — PROGRESS NOTES
Shift assessment completed. VSS, scheduled meds given/held per MAR orders crushed in applesauce. Pt is A&Ox4 slight confusion at times. Pt has no complaints or needs at this time. Brakes locked, bed in lowest position, bed alarm engaged. All belongings and call light within reach.

## 2023-08-12 NOTE — PROGRESS NOTES
Patient transferred to unit from 3A. VSS, pt is in the bed with call light in reach, will continue to monitor.

## 2023-08-12 NOTE — PROGRESS NOTES
Report called to [facility] on 4t, awaiting transport, all belongings packed up. Daughter Nasreenld Been notified.

## 2023-08-12 NOTE — CONSULTS
PRN  sennosides-docusate sodium (SENOKOT-S) 8.6-50 MG tablet 2 tablet, 2 tablet, Oral, Daily PRN  hydrALAZINE (APRESOLINE) injection 10 mg, 10 mg, IntraVENous, Q4H PRN  labetalol (NORMODYNE;TRANDATE) injection 10 mg, 10 mg, IntraVENous, Q4H PRN  benzocaine-menthol (CEPACOL SORE THROAT) lozenge 1 lozenge, 1 lozenge, Oral, Q2H PRN  aluminum & magnesium hydroxide-simethicone (MAALOX) 200-200-20 MG/5ML suspension 30 mL, 30 mL, Oral, Q6H PRN  benzonatate (TESSALON) capsule 100 mg, 100 mg, Oral, TID PRN  bisacodyl (DULCOLAX) EC tablet 5 mg, 5 mg, Oral, Daily PRN  escitalopram (LEXAPRO) tablet 40 mg, 40 mg, Oral, Daily  finasteride (PROSCAR) tablet 5 mg, 5 mg, Oral, Daily  latanoprost (XALATAN) 0.005 % ophthalmic solution 1 drop, 1 drop, Both Eyes, Nightly  melatonin tablet 3 mg, 3 mg, Oral, Nightly PRN  pantoprazole (PROTONIX) tablet 40 mg, 40 mg, Oral, BID AC  polyethylene glycol (GLYCOLAX) packet 17 g, 17 g, Oral, Daily  polyvinyl alcohol (LIQUIFILM TEARS) 1.4 % ophthalmic solution 1 drop, 1 drop, Ophthalmic, PRN  tamsulosin (FLOMAX) capsule 0.8 mg, 0.8 mg, Oral, Daily  sodium chloride flush 0.9 % injection 5-40 mL, 5-40 mL, IntraVENous, 2 times per day  sodium chloride flush 0.9 % injection 5-40 mL, 5-40 mL, IntraVENous, PRN  0.9 % sodium chloride infusion, , IntraVENous, PRN  ondansetron (ZOFRAN-ODT) disintegrating tablet 4 mg, 4 mg, Oral, Q8H PRN **OR** ondansetron (ZOFRAN) injection 4 mg, 4 mg, IntraVENous, Q6H PRN  acetaminophen (TYLENOL) tablet 650 mg, 650 mg, Oral, Q6H PRN **OR** acetaminophen (TYLENOL) suppository 650 mg, 650 mg, Rectal, Q6H PRN  dorzolamide (TRUSOPT) 2 % ophthalmic solution 1 drop, 1 drop, Both Eyes, BID  timolol (TIMOPTIC) 0.5 % ophthalmic solution 1 drop, 1 drop, Both Eyes, BID  Prior to Admission medications    Medication Sig Start Date End Date Taking?  Authorizing Provider   benzonatate (TESSALON) 100 MG capsule Take 1 capsule by mouth 3 times daily as needed for Cough   Yes Historical Hepatic: No results for input(s): AST, ALT, ALB, BILITOT, ALKPHOS in the last 72 hours. Mag:      Recent Labs     08/10/23  0747 08/11/23  1902   MG 2.10 2.50*      Phos:   No results for input(s): PHOS in the last 72 hours. INR: No results for input(s): INR in the last 72 hours. LIPASE: No results for input(s): LIPASE in the last 72 hours. AMYLASE: No results for input(s): AMYLASE in the last 72 hours. Radiology Review:      US GALLBLADDER RUQ    Result Date: 8/11/2023  EXAMINATION: RIGHT UPPER QUADRANT ULTRASOUND 8/11/2023 3:06 pm COMPARISON: 08/05/2023 HISTORY: ORDERING SYSTEM PROVIDED HISTORY: nausea TECHNOLOGIST PROVIDED HISTORY: Reason for exam:->nausea Reason for Exam: uti FINDINGS: LIVER:  The liver demonstrates normal echogenicity without evidence of intrahepatic biliary ductal dilatation. BILIARY SYSTEM:  Cholelithiasis without evidence of pericholecystic fluid or wall thickening. Negative sonographic Ji's sign. Common bile duct is within normal limits measuring 0.5 cm. RIGHT KIDNEY: The right kidney is grossly unremarkable without evidence of hydronephrosis. The right kidney measures 10.4 x 4.4 x 4.9 cm with a cortex measuring 1 cm PANCREAS:  Pancreas not well visualized. OTHER: No evidence of right upper quadrant ascites. Cholelithiasis without evidence of acute cholecystitis. Nonvisualization of the pancreas secondary to overlying bowel gas. EXAMINATION:  CT OF THE ABDOMEN AND PELVIS WITHOUT CONTRAST 8/5/2023 3:43 pm     TECHNIQUE:  CT of the abdomen and pelvis was performed without the administration of  intravenous contrast. Multiplanar reformatted images are provided for review. Automated exposure control, iterative reconstruction, and/or weight based  adjustment of the mA/kV was utilized to reduce the radiation dose to as low  as reasonably achievable. COMPARISON:  None available. HISTORY:  Acute abdominal pain and vomiting.      FINDINGS:  Image quality

## 2023-08-13 ENCOUNTER — APPOINTMENT (OUTPATIENT)
Dept: GENERAL RADIOLOGY | Age: 88
DRG: 987 | End: 2023-08-13
Payer: MEDICARE

## 2023-08-13 LAB
ALBUMIN SERPL-MCNC: 3.2 G/DL (ref 3.4–5)
ALBUMIN/GLOB SERPL: 0.9 {RATIO} (ref 1.1–2.2)
ALP SERPL-CCNC: 102 U/L (ref 40–129)
ALT SERPL-CCNC: 45 U/L (ref 10–40)
ANION GAP SERPL CALCULATED.3IONS-SCNC: 10 MMOL/L (ref 3–16)
AST SERPL-CCNC: 32 U/L (ref 15–37)
BASOPHILS # BLD: 0 K/UL (ref 0–0.2)
BASOPHILS NFR BLD: 0.4 %
BILIRUB SERPL-MCNC: 0.6 MG/DL (ref 0–1)
BUN SERPL-MCNC: 22 MG/DL (ref 7–20)
CALCIUM SERPL-MCNC: 9.4 MG/DL (ref 8.3–10.6)
CHLORIDE SERPL-SCNC: 111 MMOL/L (ref 99–110)
CO2 SERPL-SCNC: 26 MMOL/L (ref 21–32)
CREAT SERPL-MCNC: 0.8 MG/DL (ref 0.8–1.3)
DEPRECATED RDW RBC AUTO: 14.8 % (ref 12.4–15.4)
EOSINOPHIL # BLD: 0.2 K/UL (ref 0–0.6)
EOSINOPHIL NFR BLD: 2.7 %
GFR SERPLBLD CREATININE-BSD FMLA CKD-EPI: >60 ML/MIN/{1.73_M2}
GLUCOSE SERPL-MCNC: 122 MG/DL (ref 70–99)
HCT VFR BLD AUTO: 39.5 % (ref 40.5–52.5)
HGB BLD-MCNC: 12.8 G/DL (ref 13.5–17.5)
LYMPHOCYTES # BLD: 1.2 K/UL (ref 1–5.1)
LYMPHOCYTES NFR BLD: 16.3 %
MAGNESIUM SERPL-MCNC: 2.1 MG/DL (ref 1.8–2.4)
MCH RBC QN AUTO: 29.8 PG (ref 26–34)
MCHC RBC AUTO-ENTMCNC: 32.5 G/DL (ref 31–36)
MCV RBC AUTO: 91.6 FL (ref 80–100)
MONOCYTES # BLD: 0.4 K/UL (ref 0–1.3)
MONOCYTES NFR BLD: 5.2 %
NEUTROPHILS # BLD: 5.4 K/UL (ref 1.7–7.7)
NEUTROPHILS NFR BLD: 75.4 %
NT-PROBNP SERPL-MCNC: 2782 PG/ML (ref 0–449)
PLATELET # BLD AUTO: 239 K/UL (ref 135–450)
PMV BLD AUTO: 11.4 FL (ref 5–10.5)
POTASSIUM SERPL-SCNC: 3.7 MMOL/L (ref 3.5–5.1)
PROT SERPL-MCNC: 6.7 G/DL (ref 6.4–8.2)
RBC # BLD AUTO: 4.31 M/UL (ref 4.2–5.9)
SODIUM SERPL-SCNC: 147 MMOL/L (ref 136–145)
WBC # BLD AUTO: 7.2 K/UL (ref 4–11)

## 2023-08-13 PROCEDURE — 6370000000 HC RX 637 (ALT 250 FOR IP): Performed by: PHYSICIAN ASSISTANT

## 2023-08-13 PROCEDURE — 6370000000 HC RX 637 (ALT 250 FOR IP): Performed by: NURSE PRACTITIONER

## 2023-08-13 PROCEDURE — 36415 COLL VENOUS BLD VENIPUNCTURE: CPT

## 2023-08-13 PROCEDURE — 83735 ASSAY OF MAGNESIUM: CPT

## 2023-08-13 PROCEDURE — 1200000000 HC SEMI PRIVATE

## 2023-08-13 PROCEDURE — 99233 SBSQ HOSP IP/OBS HIGH 50: CPT | Performed by: NURSE PRACTITIONER

## 2023-08-13 PROCEDURE — 2580000003 HC RX 258: Performed by: INTERNAL MEDICINE

## 2023-08-13 PROCEDURE — 80053 COMPREHEN METABOLIC PANEL: CPT

## 2023-08-13 PROCEDURE — 83880 ASSAY OF NATRIURETIC PEPTIDE: CPT

## 2023-08-13 PROCEDURE — 71045 X-RAY EXAM CHEST 1 VIEW: CPT

## 2023-08-13 PROCEDURE — 6370000000 HC RX 637 (ALT 250 FOR IP): Performed by: INTERNAL MEDICINE

## 2023-08-13 PROCEDURE — 85025 COMPLETE CBC W/AUTO DIFF WBC: CPT

## 2023-08-13 PROCEDURE — 6360000002 HC RX W HCPCS: Performed by: NURSE PRACTITIONER

## 2023-08-13 PROCEDURE — 6360000002 HC RX W HCPCS: Performed by: INTERNAL MEDICINE

## 2023-08-13 PROCEDURE — 99232 SBSQ HOSP IP/OBS MODERATE 35: CPT | Performed by: SURGERY

## 2023-08-13 RX ORDER — LISINOPRIL 20 MG/1
20 TABLET ORAL DAILY
Status: DISCONTINUED | OUTPATIENT
Start: 2023-08-14 | End: 2023-08-15 | Stop reason: HOSPADM

## 2023-08-13 RX ORDER — POTASSIUM CHLORIDE 20 MEQ/1
20 TABLET, EXTENDED RELEASE ORAL ONCE
Status: COMPLETED | OUTPATIENT
Start: 2023-08-13 | End: 2023-08-13

## 2023-08-13 RX ADMIN — METOPROLOL TARTRATE 37.5 MG: 25 TABLET, FILM COATED ORAL at 22:54

## 2023-08-13 RX ADMIN — SODIUM CHLORIDE, PRESERVATIVE FREE 10 ML: 5 INJECTION INTRAVENOUS at 09:56

## 2023-08-13 RX ADMIN — HYDRALAZINE HYDROCHLORIDE 10 MG: 20 INJECTION INTRAMUSCULAR; INTRAVENOUS at 06:42

## 2023-08-13 RX ADMIN — DORZOLAMIDE HCL 1 DROP: 20 SOLUTION/ DROPS OPHTHALMIC at 09:56

## 2023-08-13 RX ADMIN — ENOXAPARIN SODIUM 70 MG: 100 INJECTION SUBCUTANEOUS at 22:55

## 2023-08-13 RX ADMIN — METOPROLOL TARTRATE 25 MG: 25 TABLET, FILM COATED ORAL at 09:55

## 2023-08-13 RX ADMIN — POLYETHYLENE GLYCOL 3350 17 G: 17 POWDER, FOR SOLUTION ORAL at 09:54

## 2023-08-13 RX ADMIN — ENOXAPARIN SODIUM 70 MG: 100 INJECTION SUBCUTANEOUS at 09:54

## 2023-08-13 RX ADMIN — POTASSIUM CHLORIDE 20 MEQ: 1500 TABLET, EXTENDED RELEASE ORAL at 12:06

## 2023-08-13 RX ADMIN — DORZOLAMIDE HCL 1 DROP: 20 SOLUTION/ DROPS OPHTHALMIC at 22:55

## 2023-08-13 RX ADMIN — ONDANSETRON 4 MG: 2 INJECTION INTRAMUSCULAR; INTRAVENOUS at 09:55

## 2023-08-13 RX ADMIN — PANTOPRAZOLE SODIUM 40 MG: 40 TABLET, DELAYED RELEASE ORAL at 05:56

## 2023-08-13 RX ADMIN — PANTOPRAZOLE SODIUM 40 MG: 40 TABLET, DELAYED RELEASE ORAL at 16:32

## 2023-08-13 RX ADMIN — ESCITALOPRAM OXALATE 40 MG: 10 TABLET ORAL at 09:55

## 2023-08-13 RX ADMIN — HYDRALAZINE HYDROCHLORIDE 10 MG: 20 INJECTION INTRAMUSCULAR; INTRAVENOUS at 01:15

## 2023-08-13 RX ADMIN — FINASTERIDE 5 MG: 5 TABLET, FILM COATED ORAL at 09:55

## 2023-08-13 RX ADMIN — LATANOPROST 1 DROP: 50 SOLUTION OPHTHALMIC at 22:55

## 2023-08-13 RX ADMIN — LISINOPRIL 10 MG: 10 TABLET ORAL at 09:57

## 2023-08-13 RX ADMIN — TAMSULOSIN HYDROCHLORIDE 0.8 MG: 0.4 CAPSULE ORAL at 09:55

## 2023-08-13 RX ADMIN — TIMOLOL MALEATE 1 DROP: 5 SOLUTION OPHTHALMIC at 22:55

## 2023-08-13 RX ADMIN — FUROSEMIDE 20 MG: 20 TABLET ORAL at 09:55

## 2023-08-13 RX ADMIN — METOPROLOL TARTRATE 37.5 MG: 25 TABLET, FILM COATED ORAL at 16:32

## 2023-08-13 RX ADMIN — TIMOLOL MALEATE 1 DROP: 5 SOLUTION OPHTHALMIC at 09:56

## 2023-08-13 RX ADMIN — METOPROLOL TARTRATE 25 MG: 25 TABLET, FILM COATED ORAL at 05:56

## 2023-08-13 ASSESSMENT — PAIN SCALES - GENERAL: PAINLEVEL_OUTOF10: 0

## 2023-08-13 NOTE — PLAN OF CARE
Problem: Discharge Planning  Goal: Discharge to home or other facility with appropriate resources  Outcome: Progressing     Problem: Skin/Tissue Integrity  Goal: Absence of new skin breakdown  Description: 1. Monitor for areas of redness and/or skin breakdown  2. Assess vascular access sites hourly  3. Every 4-6 hours minimum:  Change oxygen saturation probe site  4. Every 4-6 hours:  If on nasal continuous positive airway pressure, respiratory therapy assess nares and determine need for appliance change or resting period.   Outcome: Progressing     Problem: Safety - Adult  Goal: Free from fall injury  8/12/2023 2245 by Franco Anaya RN  Outcome: Progressing

## 2023-08-13 NOTE — PROGRESS NOTES
Urology Progress Note  [facility]     Patient: Humberto Gibson MRN: [id number]  Room/Bed: 7YV-7980/9606-02   YOB: 1935  Age/Sex: 81 y.o.male  Admission Date: 8/5/2023     Date of Service:  8/13/2023    ASSESSMENT/PLAN     1. REYES (acute kidney injury) ([address])    2. Dehydration    3. Nausea and vomiting, unspecified vomiting type    4. Urinary tract infection without hematuria, site unspecified    5. Dysphagia, unspecified type      Admitted with suspected aspiration PNA  UTI per UA although urine cultures showing negative  Urinary Retention on proscar and flomax, normal size prostate gland  -Had a lou, failed his voiding trial, then had a traumatic straight cath, now has a coude  -Urine is draining CYU in the lou.   -Large stool burden noted on CT    Recommendations:  Continue Proscar and Flomax  Continue lou x1 week- voiding trial in SNF- ANY updated - lou draining CYU  Outpatient follow up if failed voiding trial to consider cystoscopy    All patient questions were answered. He understands the plan as listed above. SUBJECTIVE     Chief Complaint:   Chief Complaint   Patient presents with    Kalin Mishra ems from [facility] d/t fall that happened last night. Pt states he does not remember falling. Pt alert and oriented during triage     Dizziness     Pt states he also endorses dizziness and overall weakness upon arrival to the ER. Also had 2 episodes of emesis PTA and upon arrival. Pt DNRCC       24 Hour Events: Today patient reports tolerating lou. Otherwise symptoms are overall improved and pain is adequately controlled. Denies nausea/vomiting. No other genitourinary symptoms.     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 There is no evidence of hydrocephalus. There are chronic age-appropriate atrophic changes. ORBITS: The visualized portion of the orbits demonstrate no acute abnormality. SINUSES: The visualized paranasal sinuses and mastoid air cells demonstrate no acute abnormality. SOFT TISSUES/SKULL:  No acute abnormality of the visualized skull or soft tissues. No acute intracranial abnormality. CT CHEST WO CONTRAST    Result Date: 8/8/2023  EXAMINATION: CT OF THE CHEST WITHOUT CONTRAST 8/8/2023 3:07 pm TECHNIQUE: CT of the chest was performed without the administration of intravenous contrast. Multiplanar reformatted images are provided for review. Automated exposure control, iterative reconstruction, and/or weight based adjustment of the mA/kV was utilized to reduce the radiation dose to as low as reasonably achievable. COMPARISON: 01/07/2016 HISTORY: ORDERING SYSTEM PROVIDED HISTORY: pneumonia TECHNOLOGIST PROVIDED HISTORY: Reason for exam:->pneumonia Reason for Exam: pneumonia FINDINGS: Mediastinum: Atherosclerotic changes of the aorta. Negative for aneurysm. Heart size is enlarged. Mild coronary artery calcifications. Negative for pericardial effusion. Small hiatus hernia. Lungs/pleura: Bilateral pleural effusions. Right is larger than the left and it is moderate. Consolidation in the lower lobes bilaterally but much more prominent on the right. Consolidation also in the right middle lobe and the right upper lobe. Multiple air bronchograms. Ground-glass opacities at the right lung apex. Calcified apical pleural thickening bilaterally. The trachea and bronchi are patent. Upper Abdomen: Gastric wall appears to be thickened but is incompletely evaluated. Soft Tissues/Bones: Scoliosis. Moderate osteoarthritic change of the sternoclavicular joints. Mild glenohumeral arthropathy. Spondylosis. No lytic destructive lesions.      1. Diffuse

## 2023-08-13 NOTE — PROGRESS NOTES
Kathryne Palm, MD Libbie Boeck, MD Gunnar Bers, MD                                  Office: [phone]                 Fax: [phone]          Boxed                     NEPHROLOGY NOTE:     PATIENT NAME: Cipriano Chen  : 1935  MRN: [id number]      Name:  Cipriano Chen Date/Time of Admission: 2023 10:53 AM    CSN: [id number] Attending Provider: Tia Fernandez MD   Room/Bed: [address] [place], [place] [address]64170/8452-74 : 1935 Age: 81 y.o. Reason for Nephrology consult :  Evaluation of patient with worsening creatinine and elevated electrolytes. Doing better. Less hypotensive. Good urine output. Blood pressure is elevated. Medications reviewed and appropriate. Antibiotic dose reviewed. Cipriano Chen 81 y.o. Has been admitted with multiple complaints of generalized weakness nausea and vomiting. Surgical service evaluating. Possible cholecystitis. Appreciate assistance from urology team with catheter placement. Borderline hypotension. Urine output improving after IV    Nephrology consult for evaluation of creatinine of 1.1 and a BUN of 29. Noted to have worsening electrolyte imbalance. - Sodium is up to 150. - Hypokalemia has improved after replacement. WBC count is 7.6. Medications reviewed and appropriate. Medications reviewed. Medical records reviewed. Past Medical History :         Past Medical History:   Diagnosis Date    Acid reflux     Anxiety     Arthritis     BPH (benign prostatic hyperplasia)     Constipation     Dementia (HCC)     Esophagitis     Glaucoma     MRSA infection 16    urine    Neurocognitive disorder        Medications  Which i have  Reviewed, also have reviewed home medications  Prior to Admission medications    Medication Sig Start Date End Date Taking?  Authorizing Provider   benzonatate (TESSALON) 100 MG capsule Take 1 capsule by mouth 3 times daily as needed for

## 2023-08-13 NOTE — PROGRESS NOTES
[facility]        PATIENT NAME: Chris Cortes     TODAY'S DATE: 8/13/2023    CC: nausea    SUBJECTIVE:    Pt has had feelings of sick to his stomach and nausea return, feels back to where he started.  Not eating today  No CP or SOB     Current Medications:   Current Facility-Administered Medications: [START ON 8/14/2023] lisinopril (PRINIVIL;ZESTRIL) tablet 20 mg, 20 mg, Oral, Daily  metoprolol tartrate (LOPRESSOR) tablet 37.5 mg, 37.5 mg, Oral, Q6H  dextrose 5 % solution, , IntraVENous, Continuous  sodium phosphate (FLEET) rectal enema 1 enema, 1 enema, Rectal, Once  potassium chloride (KLOR-CON M) extended release tablet 40 mEq, 40 mEq, Oral, PRN **OR** potassium bicarb-citric acid (EFFER-K) effervescent tablet 40 mEq, 40 mEq, Oral, PRN **OR** potassium chloride 10 mEq/100 mL IVPB (Peripheral Line), 10 mEq, IntraVENous, PRN  magnesium sulfate 2000 mg in 50 mL IVPB premix, 2,000 mg, IntraVENous, PRN  enoxaparin (LOVENOX) injection 70 mg, 70 mg, SubCUTAneous, BID  furosemide (LASIX) tablet 20 mg, 20 mg, Oral, Daily  sennosides-docusate sodium (SENOKOT-S) 8.6-50 MG tablet 2 tablet, 2 tablet, Oral, Daily PRN  hydrALAZINE (APRESOLINE) injection 10 mg, 10 mg, IntraVENous, Q4H PRN  labetalol (NORMODYNE;TRANDATE) injection 10 mg, 10 mg, IntraVENous, Q4H PRN  benzocaine-menthol (CEPACOL SORE THROAT) lozenge 1 lozenge, 1 lozenge, Oral, Q2H PRN  aluminum & magnesium hydroxide-simethicone (MAALOX) 200-200-20 MG/5ML suspension 30 mL, 30 mL, Oral, Q6H PRN  benzonatate (TESSALON) capsule 100 mg, 100 mg, Oral, TID PRN  bisacodyl (DULCOLAX) EC tablet 5 mg, 5 mg, Oral, Daily PRN  escitalopram (LEXAPRO) tablet 40 mg, 40 mg, Oral, Daily  finasteride (PROSCAR) tablet 5 mg, 5 mg, Oral, Daily  latanoprost (XALATAN) 0.005 % ophthalmic solution 1 drop, 1 drop, Both Eyes, Nightly  melatonin tablet 3 mg, 3 mg, Oral, Nightly PRN  pantoprazole (PROTONIX) tablet 40 mg, 40 mg, Oral, BID AC  polyethylene

## 2023-08-13 NOTE — PLAN OF CARE
Problem: Discharge Planning  Goal: Discharge to home or other facility with appropriate resources  8/13/2023 1132 by Taegan Christensen RN  Outcome: Progressing  8/12/2023 2245 by Sonja Olvera RN  Outcome: Progressing     Problem: Skin/Tissue Integrity  Goal: Absence of new skin breakdown  Description: 1. Monitor for areas of redness and/or skin breakdown  2. Assess vascular access sites hourly  3. Every 4-6 hours minimum:  Change oxygen saturation probe site  4. Every 4-6 hours:  If on nasal continuous positive airway pressure, respiratory therapy assess nares and determine need for appliance change or resting period.   8/13/2023 1132 by Teagan Christensen RN  Outcome: Progressing  8/12/2023 2245 by Sonja Olvera RN  Outcome: Progressing     Problem: Safety - Adult  Goal: Free from fall injury  8/13/2023 1132 by Teagan Christensen RN  Outcome: Progressing  8/12/2023 2245 by Sonja Olvera RN  Outcome: Progressing     Problem: ABCDS Injury Assessment  Goal: Absence of physical injury  Outcome: Progressing     Problem: Neurosensory - Adult  Goal: Achieves stable or improved neurological status  Outcome: Progressing  Goal: Absence of seizures  Outcome: Progressing  Goal: Remains free of injury related to seizures activity  Outcome: Progressing     Problem: Respiratory - Adult  Goal: Achieves optimal ventilation and oxygenation  Outcome: Progressing     Problem: Skin/Tissue Integrity - Adult  Goal: Skin integrity remains intact  Outcome: Progressing  Goal: Oral mucous membranes remain intact  Outcome: Progressing     Problem: Musculoskeletal - Adult  Goal: Return mobility to safest level of function  Outcome: Progressing  Goal: Return ADL status to a safe level of function  Outcome: Progressing     Problem: Genitourinary - Adult  Goal: Absence of urinary retention  Outcome: Progressing     Problem: Pain  Goal: Verbalizes/displays adequate comfort level or baseline comfort level  Outcome: Progressing

## 2023-08-13 NOTE — PROGRESS NOTES
[address] [facility]    8/13/2023 10:11 AM        Name: Anastacio Rose . Admitted: 8/5/2023  Primary Care Provider: Alexandra Torres MD (Tel: [phone])      Subjective:  . Admitted with fall/ dizziness   Noted to have possible UTI/ Aspiration PNA  And fecal impaction. Had EGD Wednesday morning. EGD negative for stricture. Has gallstone but no acute cholecystitis. He did develop afib but has subsequently converted to NSR the evening of 8/11. This am he is nauseated, has small amount of emesis in basin. Lives in Assisted living at [place]. Daughter is very involved in his care. I spoke to daughter Thursday at length about plan of care.  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)  Reviewed interval ancillary notes    Current Medications  [START ON 8/14/2023] lisinopril (PRINIVIL;ZESTRIL) tablet 20 mg, Daily  dextrose 5 % solution, Continuous  sodium phosphate (FLEET) rectal enema 1 enema, Once  metoprolol tartrate (LOPRESSOR) tablet 25 mg, Q6H  potassium chloride (KLOR-CON M) extended release tablet 40 mEq, PRN   Or  potassium bicarb-citric acid (EFFER-K) effervescent tablet 40 mEq, PRN   Or  potassium chloride 10 mEq/100 mL IVPB (Peripheral Line), PRN  magnesium sulfate 2000 mg in 50 mL IVPB premix, PRN  enoxaparin (LOVENOX) injection 70 mg, BID  furosemide (LASIX) tablet 20 mg, Daily  sennosides-docusate sodium (SENOKOT-S) 8.6-50 MG tablet 2 tablet, Daily PRN  hydrALAZINE (APRESOLINE) injection 10 mg, Q4H PRN  labetalol (NORMODYNE;TRANDATE) injection 10 mg, Q4H PRN  benzocaine-menthol (CEPACOL SORE THROAT) lozenge 1 lozenge, Q2H PRN  aluminum & magnesium hydroxide-simethicone (MAALOX) 200-200-20 MG/5ML suspension 30 mL, Q6H PRN  benzonatate (TESSALON) capsule 100 mg, TID PRN  bisacodyl (DULCOLAX) EC tablet 5 mg, Daily PRN  escitalopram (LEXAPRO) tablet 40 mg, on board. Currently on metoprolol 25 mg q 6 hours. Now in NSR. 3.   Cholelithiasis-denies abdominal pain but continues to complain of nausea. Small amount of emesis this am. RUQ negative for cholecystitis. Discussed with Dr Aleksandra Hayes for lap faith tomorrow if electrolytes improved. Discussed with daughter yesterday. 4.   Urinary retention-on proscar and flomax. Had lou, failed voiding trial then had traumatic straight cath. Now has coude. Urology consulted-voiding trial at [facility]. 5.   Acute sCHF-new diagnosis although last echo was 10 years ago. No pulmonary edema on initial CXR. Getting 20 of po lasix. Cards on board. 6.   Dysphagia-EGD negative for stricture but showed gastritis. Continue PPI. Needs modified barium swallow  7. Hypernatremia-on D5W at 50/hr. Nephro on board. Repeat labs pending    Patient is not to receive pain meds, benzos, or muscle relaxers.    Diet: Diet NPO Exceptions are: Sips of Water with Meds  Code:Limited  DVT PPXlovenox       Roseline Baldwin PA-C   8/13/2023 10:11 AM

## 2023-08-13 NOTE — PROGRESS NOTES
[facility]   Cardiology Progress Note     Date: 8/13/2023  Admit Date: 8/5/2023     Reason for consultation:     Chief Complaint   Patient presents with    Kalin Mishra ems from [facility] d/t fall that happened last night. Pt states he does not remember falling. Pt alert and oriented during triage     Dizziness     Pt states he also endorses dizziness and overall weakness upon arrival to the ER. Also had 2 episodes of emesis PTA and upon arrival. Pt [facility]       History of Present Illness: History obtained from patient and medical record. Humberto Gibson is a 81 y.o. male who comes to the ED for evaluation of dizziness. He was lightheaded when standing and felt that he was going to fall. He fell the day before admission and hit his head. Symptoms improve with sitting. No chest pain or shortness of breath. He admits to nausea and vomiting. He lives at an assisted living facility. He has a history of HTN, dementia, GERD, glaucome, BPH. No fever chills. No dysphagia, melena, or hematochezia. On admission, he was found to have fecal impaction, possible UTI and aspiration pneumonia. Had upper endoscopy on 8/9 that was normal.  An echo was performed showing LVEF 40%, RVSP 44 mmHg. He has also been found to be in atrial fib with rapid rate today. We are consulted for LV dysfunction. His bNP increased from 1400 to 6500 after hydration. He is 5 liters positive after giving him lots of fluids. However, he is still not that short of breath. Heart rate \increased with afib. Blood pressure elevated on admission and throughout. (per consult note)    Interval Hx: Today, he continues to feel nauseated. Has discomfort from throat down to epigastric area. That worsens with eating. Patient seen and examined. Clinical notes reviewed. Telemetry reviewed. No new complaints today. No major events overnight.    Denies having chest pain, palpitations, shortness of Severe malnutrition ([address]) 02/17/2022    Failure to thrive in adult 02/17/2022    Age-related macular degeneration 05/04/2021    Chronic combined systolic and diastolic congestive heart failure ([address]) 08/12/2023    Calculus of gallbladder with chronic cholecystitis without obstruction 08/12/2023    Chronic combined systolic and diastolic heart failure ([address]) 08/10/2023    Hypervolemia 08/10/2023    UTI (urinary tract infection) 08/05/2023    REYES (acute kidney injury) ([address]) 08/05/2023    Bilateral lower extremity edema 60/73/7999    Complicated UTI (urinary tract infection) 08/05/2023    Other secondary kyphosis, 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    Gait difficulty 07/20/2021    Cardiac arrhythmia 09/04/2019    Chronic midline low back pain without sciatica 06/24/2019    Frailty syndrome in geriatric patient 02/21/2019    Urinary incontinence 02/04/2019    Chronic idiopathic constipation 02/04/2019    Late onset Alzheimer's disease without behavioral disturbance ([address]) 04/10/2017    Primary open angle glaucoma of both eyes, mild stage 07/18/2016    S/P TURP (status post transurethral resection of prostate) 07/18/2016    Intrinsic eczema 07/18/2016    Essential hypertension, benign 11/20/2013    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 on PO lasix 20mg    Atrial fibrillation   ~ new onset.  pt converted to SR Friday  ~ currently on therapeutic lovenox with plans for eliquis at dc  ~ evaluated by EP    NSVT  ~ 38 beats of asymptomatic

## 2023-08-14 ENCOUNTER — APPOINTMENT (OUTPATIENT)
Dept: GENERAL RADIOLOGY | Age: 88
DRG: 987 | End: 2023-08-14
Payer: MEDICARE

## 2023-08-14 ENCOUNTER — ANESTHESIA EVENT (OUTPATIENT)
Dept: OPERATING ROOM | Age: 88
DRG: 987 | End: 2023-08-14
Payer: MEDICARE

## 2023-08-14 ENCOUNTER — ANESTHESIA (OUTPATIENT)
Dept: OPERATING ROOM | Age: 88
DRG: 987 | End: 2023-08-14
Payer: MEDICARE

## 2023-08-14 LAB
ANION GAP SERPL CALCULATED.3IONS-SCNC: 9 MMOL/L (ref 3–16)
BASOPHILS # BLD: 0 K/UL (ref 0–0.2)
BASOPHILS NFR BLD: 0.5 %
BUN SERPL-MCNC: 19 MG/DL (ref 7–20)
CALCIUM SERPL-MCNC: 8.8 MG/DL (ref 8.3–10.6)
CHLORIDE SERPL-SCNC: 107 MMOL/L (ref 99–110)
CO2 SERPL-SCNC: 26 MMOL/L (ref 21–32)
CREAT SERPL-MCNC: 0.8 MG/DL (ref 0.8–1.3)
DEPRECATED RDW RBC AUTO: 14.3 % (ref 12.4–15.4)
EKG ATRIAL RATE: 65 BPM
EKG DIAGNOSIS: NORMAL
EKG P AXIS: 59 DEGREES
EKG P-R INTERVAL: 124 MS
EKG Q-T INTERVAL: 428 MS
EKG QRS DURATION: 78 MS
EKG QTC CALCULATION (BAZETT): 445 MS
EKG R AXIS: -36 DEGREES
EKG T AXIS: 35 DEGREES
EKG VENTRICULAR RATE: 65 BPM
EOSINOPHIL # BLD: 0.2 K/UL (ref 0–0.6)
EOSINOPHIL NFR BLD: 3.5 %
GFR SERPLBLD CREATININE-BSD FMLA CKD-EPI: >60 ML/MIN/{1.73_M2}
GLUCOSE SERPL-MCNC: 99 MG/DL (ref 70–99)
HCT VFR BLD AUTO: 37.8 % (ref 40.5–52.5)
HGB BLD-MCNC: 12.3 G/DL (ref 13.5–17.5)
LYMPHOCYTES # BLD: 1 K/UL (ref 1–5.1)
LYMPHOCYTES NFR BLD: 14.4 %
MAGNESIUM SERPL-MCNC: 2.2 MG/DL (ref 1.8–2.4)
MCH RBC QN AUTO: 29.8 PG (ref 26–34)
MCHC RBC AUTO-ENTMCNC: 32.5 G/DL (ref 31–36)
MCV RBC AUTO: 91.9 FL (ref 80–100)
MONOCYTES # BLD: 0.4 K/UL (ref 0–1.3)
MONOCYTES NFR BLD: 5.8 %
NEUTROPHILS # BLD: 5.2 K/UL (ref 1.7–7.7)
NEUTROPHILS NFR BLD: 75.8 %
PLATELET # BLD AUTO: 225 K/UL (ref 135–450)
PMV BLD AUTO: 11.2 FL (ref 5–10.5)
POTASSIUM SERPL-SCNC: 3.4 MMOL/L (ref 3.5–5.1)
RBC # BLD AUTO: 4.11 M/UL (ref 4.2–5.9)
SODIUM SERPL-SCNC: 142 MMOL/L (ref 136–145)
WBC # BLD AUTO: 6.8 K/UL (ref 4–11)

## 2023-08-14 PROCEDURE — APPNB30 APP NON BILLABLE TIME 0-30 MINS: Performed by: NURSE PRACTITIONER

## 2023-08-14 PROCEDURE — 6360000002 HC RX W HCPCS: Performed by: NURSE ANESTHETIST, CERTIFIED REGISTERED

## 2023-08-14 PROCEDURE — 6370000000 HC RX 637 (ALT 250 FOR IP): Performed by: SURGERY

## 2023-08-14 PROCEDURE — C9399 UNCLASSIFIED DRUGS OR BIOLOG: HCPCS | Performed by: NURSE ANESTHETIST, CERTIFIED REGISTERED

## 2023-08-14 PROCEDURE — 36415 COLL VENOUS BLD VENIPUNCTURE: CPT

## 2023-08-14 PROCEDURE — 88304 TISSUE EXAM BY PATHOLOGIST: CPT

## 2023-08-14 PROCEDURE — 74300 X-RAY BILE DUCTS/PANCREAS: CPT

## 2023-08-14 PROCEDURE — 99232 SBSQ HOSP IP/OBS MODERATE 35: CPT | Performed by: NURSE PRACTITIONER

## 2023-08-14 PROCEDURE — 2580000003 HC RX 258: Performed by: NURSE ANESTHETIST, CERTIFIED REGISTERED

## 2023-08-14 PROCEDURE — 3700000000 HC ANESTHESIA ATTENDED CARE: Performed by: SURGERY

## 2023-08-14 PROCEDURE — 3600000014 HC SURGERY LEVEL 4 ADDTL 15MIN: Performed by: SURGERY

## 2023-08-14 PROCEDURE — 1200000000 HC SEMI PRIVATE

## 2023-08-14 PROCEDURE — 93010 ELECTROCARDIOGRAM REPORT: CPT | Performed by: INTERNAL MEDICINE

## 2023-08-14 PROCEDURE — 2720000010 HC SURG SUPPLY STERILE: Performed by: SURGERY

## 2023-08-14 PROCEDURE — APPSS15 APP SPLIT SHARED TIME 0-15 MINUTES: Performed by: NURSE PRACTITIONER

## 2023-08-14 PROCEDURE — 7100000000 HC PACU RECOVERY - FIRST 15 MIN: Performed by: SURGERY

## 2023-08-14 PROCEDURE — 7100000001 HC PACU RECOVERY - ADDTL 15 MIN: Performed by: SURGERY

## 2023-08-14 PROCEDURE — BF131ZZ FLUOROSCOPY OF GALLBLADDER AND BILE DUCTS USING LOW OSMOLAR CONTRAST: ICD-10-PCS | Performed by: SURGERY

## 2023-08-14 PROCEDURE — 47563 LAPARO CHOLECYSTECTOMY/GRAPH: CPT | Performed by: SURGERY

## 2023-08-14 PROCEDURE — 2580000003 HC RX 258: Performed by: SURGERY

## 2023-08-14 PROCEDURE — A4217 STERILE WATER/SALINE, 500 ML: HCPCS | Performed by: SURGERY

## 2023-08-14 PROCEDURE — 6360000002 HC RX W HCPCS: Performed by: SURGERY

## 2023-08-14 PROCEDURE — 0FT44ZZ RESECTION OF GALLBLADDER, PERCUTANEOUS ENDOSCOPIC APPROACH: ICD-10-PCS | Performed by: SURGERY

## 2023-08-14 PROCEDURE — 2500000003 HC RX 250 WO HCPCS: Performed by: NURSE ANESTHETIST, CERTIFIED REGISTERED

## 2023-08-14 PROCEDURE — 3700000001 HC ADD 15 MINUTES (ANESTHESIA): Performed by: SURGERY

## 2023-08-14 PROCEDURE — 85025 COMPLETE CBC W/AUTO DIFF WBC: CPT

## 2023-08-14 PROCEDURE — 2709999900 HC NON-CHARGEABLE SUPPLY: Performed by: SURGERY

## 2023-08-14 PROCEDURE — 6370000000 HC RX 637 (ALT 250 FOR IP): Performed by: INTERNAL MEDICINE

## 2023-08-14 PROCEDURE — 2500000003 HC RX 250 WO HCPCS: Performed by: SURGERY

## 2023-08-14 PROCEDURE — 80048 BASIC METABOLIC PNL TOTAL CA: CPT

## 2023-08-14 PROCEDURE — 6370000000 HC RX 637 (ALT 250 FOR IP): Performed by: NURSE PRACTITIONER

## 2023-08-14 PROCEDURE — 6360000002 HC RX W HCPCS: Performed by: NURSE PRACTITIONER

## 2023-08-14 PROCEDURE — 83735 ASSAY OF MAGNESIUM: CPT

## 2023-08-14 PROCEDURE — 6360000004 HC RX CONTRAST MEDICATION: Performed by: SURGERY

## 2023-08-14 PROCEDURE — 3600000004 HC SURGERY LEVEL 4 BASE: Performed by: SURGERY

## 2023-08-14 PROCEDURE — 6370000000 HC RX 637 (ALT 250 FOR IP): Performed by: PHYSICIAN ASSISTANT

## 2023-08-14 PROCEDURE — 93005 ELECTROCARDIOGRAM TRACING: CPT | Performed by: NURSE PRACTITIONER

## 2023-08-14 RX ORDER — DEXAMETHASONE SODIUM PHOSPHATE 4 MG/ML
INJECTION, SOLUTION INTRA-ARTICULAR; INTRALESIONAL; INTRAMUSCULAR; INTRAVENOUS; SOFT TISSUE PRN
Status: DISCONTINUED | OUTPATIENT
Start: 2023-08-14 | End: 2023-08-14 | Stop reason: SDUPTHER

## 2023-08-14 RX ORDER — EPHEDRINE SULFATE/0.9% NACL/PF 50 MG/5 ML
SYRINGE (ML) INTRAVENOUS PRN
Status: DISCONTINUED | OUTPATIENT
Start: 2023-08-14 | End: 2023-08-14 | Stop reason: SDUPTHER

## 2023-08-14 RX ORDER — LIDOCAINE HYDROCHLORIDE 20 MG/ML
INJECTION, SOLUTION INFILTRATION; PERINEURAL PRN
Status: DISCONTINUED | OUTPATIENT
Start: 2023-08-14 | End: 2023-08-14 | Stop reason: SDUPTHER

## 2023-08-14 RX ORDER — ROCURONIUM BROMIDE 10 MG/ML
INJECTION, SOLUTION INTRAVENOUS PRN
Status: DISCONTINUED | OUTPATIENT
Start: 2023-08-14 | End: 2023-08-14 | Stop reason: SDUPTHER

## 2023-08-14 RX ORDER — HYDROMORPHONE HYDROCHLORIDE 2 MG/ML
0.5 INJECTION, SOLUTION INTRAMUSCULAR; INTRAVENOUS; SUBCUTANEOUS EVERY 5 MIN PRN
Status: DISCONTINUED | OUTPATIENT
Start: 2023-08-14 | End: 2023-08-14 | Stop reason: HOSPADM

## 2023-08-14 RX ORDER — SODIUM CHLORIDE 9 MG/ML
INJECTION, SOLUTION INTRAVENOUS PRN
Status: DISCONTINUED | OUTPATIENT
Start: 2023-08-14 | End: 2023-08-14 | Stop reason: HOSPADM

## 2023-08-14 RX ORDER — BUPIVACAINE HYDROCHLORIDE AND EPINEPHRINE 5; 5 MG/ML; UG/ML
INJECTION, SOLUTION EPIDURAL; INTRACAUDAL; PERINEURAL
Status: COMPLETED | OUTPATIENT
Start: 2023-08-14 | End: 2023-08-14

## 2023-08-14 RX ORDER — CEFAZOLIN SODIUM 1 G/3ML
INJECTION, POWDER, FOR SOLUTION INTRAMUSCULAR; INTRAVENOUS PRN
Status: DISCONTINUED | OUTPATIENT
Start: 2023-08-14 | End: 2023-08-14 | Stop reason: SDUPTHER

## 2023-08-14 RX ORDER — MAGNESIUM HYDROXIDE 1200 MG/15ML
LIQUID ORAL CONTINUOUS PRN
Status: COMPLETED | OUTPATIENT
Start: 2023-08-14 | End: 2023-08-14

## 2023-08-14 RX ORDER — SPIRONOLACTONE 25 MG/1
12.5 TABLET ORAL DAILY
Status: DISCONTINUED | OUTPATIENT
Start: 2023-08-15 | End: 2023-08-15 | Stop reason: HOSPADM

## 2023-08-14 RX ORDER — SODIUM CHLORIDE 0.9 % (FLUSH) 0.9 %
5-40 SYRINGE (ML) INJECTION EVERY 12 HOURS SCHEDULED
Status: DISCONTINUED | OUTPATIENT
Start: 2023-08-14 | End: 2023-08-14 | Stop reason: HOSPADM

## 2023-08-14 RX ORDER — SODIUM CHLORIDE 9 MG/ML
INJECTION, SOLUTION INTRAVENOUS CONTINUOUS PRN
Status: DISCONTINUED | OUTPATIENT
Start: 2023-08-14 | End: 2023-08-14 | Stop reason: SDUPTHER

## 2023-08-14 RX ORDER — ONDANSETRON 2 MG/ML
4 INJECTION INTRAMUSCULAR; INTRAVENOUS
Status: DISCONTINUED | OUTPATIENT
Start: 2023-08-14 | End: 2023-08-14 | Stop reason: HOSPADM

## 2023-08-14 RX ORDER — SODIUM CHLORIDE 0.9 % (FLUSH) 0.9 %
5-40 SYRINGE (ML) INJECTION PRN
Status: DISCONTINUED | OUTPATIENT
Start: 2023-08-14 | End: 2023-08-14 | Stop reason: HOSPADM

## 2023-08-14 RX ORDER — PROPOFOL 10 MG/ML
INJECTION, EMULSION INTRAVENOUS PRN
Status: DISCONTINUED | OUTPATIENT
Start: 2023-08-14 | End: 2023-08-14 | Stop reason: SDUPTHER

## 2023-08-14 RX ORDER — OXYCODONE HYDROCHLORIDE AND ACETAMINOPHEN 5; 325 MG/1; MG/1
1 TABLET ORAL EVERY 8 HOURS PRN
Status: DISCONTINUED | OUTPATIENT
Start: 2023-08-14 | End: 2023-08-15 | Stop reason: HOSPADM

## 2023-08-14 RX ORDER — ACETAMINOPHEN 500 MG
1000 TABLET ORAL EVERY 6 HOURS PRN
Status: DISCONTINUED | OUTPATIENT
Start: 2023-08-14 | End: 2023-08-15 | Stop reason: HOSPADM

## 2023-08-14 RX ORDER — ONDANSETRON 2 MG/ML
INJECTION INTRAMUSCULAR; INTRAVENOUS PRN
Status: DISCONTINUED | OUTPATIENT
Start: 2023-08-14 | End: 2023-08-14 | Stop reason: SDUPTHER

## 2023-08-14 RX ORDER — FENTANYL CITRATE 50 UG/ML
INJECTION, SOLUTION INTRAMUSCULAR; INTRAVENOUS PRN
Status: DISCONTINUED | OUTPATIENT
Start: 2023-08-14 | End: 2023-08-14 | Stop reason: SDUPTHER

## 2023-08-14 RX ORDER — POTASSIUM CHLORIDE 7.45 MG/ML
10 INJECTION INTRAVENOUS
Status: COMPLETED | OUTPATIENT
Start: 2023-08-14 | End: 2023-08-14

## 2023-08-14 RX ORDER — MEPERIDINE HYDROCHLORIDE 25 MG/ML
12.5 INJECTION INTRAMUSCULAR; INTRAVENOUS; SUBCUTANEOUS EVERY 5 MIN PRN
Status: DISCONTINUED | OUTPATIENT
Start: 2023-08-14 | End: 2023-08-14 | Stop reason: HOSPADM

## 2023-08-14 RX ORDER — MORPHINE SULFATE 2 MG/ML
2 INJECTION, SOLUTION INTRAMUSCULAR; INTRAVENOUS EVERY 4 HOURS PRN
Status: DISCONTINUED | OUTPATIENT
Start: 2023-08-14 | End: 2023-08-15 | Stop reason: HOSPADM

## 2023-08-14 RX ADMIN — SUGAMMADEX 200 MG: 100 INJECTION, SOLUTION INTRAVENOUS at 14:35

## 2023-08-14 RX ADMIN — DORZOLAMIDE HCL 1 DROP: 20 SOLUTION/ DROPS OPHTHALMIC at 21:17

## 2023-08-14 RX ADMIN — METOPROLOL TARTRATE 37.5 MG: 25 TABLET, FILM COATED ORAL at 21:16

## 2023-08-14 RX ADMIN — DORZOLAMIDE HCL 1 DROP: 20 SOLUTION/ DROPS OPHTHALMIC at 10:11

## 2023-08-14 RX ADMIN — FENTANYL CITRATE 50 MCG: 50 INJECTION, SOLUTION INTRAMUSCULAR; INTRAVENOUS at 14:04

## 2023-08-14 RX ADMIN — Medication 10 MG: at 14:18

## 2023-08-14 RX ADMIN — ONDANSETRON 4 MG: 2 INJECTION INTRAMUSCULAR; INTRAVENOUS at 16:24

## 2023-08-14 RX ADMIN — METOPROLOL TARTRATE 37.5 MG: 25 TABLET, FILM COATED ORAL at 16:22

## 2023-08-14 RX ADMIN — ONDANSETRON 4 MG: 2 INJECTION INTRAMUSCULAR; INTRAVENOUS at 14:25

## 2023-08-14 RX ADMIN — Medication 10 MEQ: at 10:00

## 2023-08-14 RX ADMIN — HYDRALAZINE HYDROCHLORIDE 10 MG: 20 INJECTION INTRAMUSCULAR; INTRAVENOUS at 16:46

## 2023-08-14 RX ADMIN — Medication 10 MEQ: at 09:45

## 2023-08-14 RX ADMIN — TIMOLOL MALEATE 1 DROP: 5 SOLUTION OPHTHALMIC at 10:11

## 2023-08-14 RX ADMIN — DEXAMETHASONE SODIUM PHOSPHATE 4 MG: 4 INJECTION, SOLUTION INTRAMUSCULAR; INTRAVENOUS at 14:25

## 2023-08-14 RX ADMIN — PANTOPRAZOLE SODIUM 40 MG: 40 TABLET, DELAYED RELEASE ORAL at 16:22

## 2023-08-14 RX ADMIN — METOPROLOL TARTRATE 37.5 MG: 25 TABLET, FILM COATED ORAL at 09:39

## 2023-08-14 RX ADMIN — LIDOCAINE HYDROCHLORIDE 60 MG: 20 INJECTION, SOLUTION INFILTRATION; PERINEURAL at 14:04

## 2023-08-14 RX ADMIN — Medication 10 MG: at 14:33

## 2023-08-14 RX ADMIN — ROCURONIUM BROMIDE 40 MG: 10 INJECTION INTRAVENOUS at 14:05

## 2023-08-14 RX ADMIN — LISINOPRIL 20 MG: 20 TABLET ORAL at 09:38

## 2023-08-14 RX ADMIN — CEFAZOLIN 2000 MG: 2 INJECTION, POWDER, FOR SOLUTION INTRAMUSCULAR; INTRAVENOUS at 13:45

## 2023-08-14 RX ADMIN — SODIUM CHLORIDE: 9 INJECTION, SOLUTION INTRAVENOUS at 13:48

## 2023-08-14 RX ADMIN — LATANOPROST 1 DROP: 50 SOLUTION OPHTHALMIC at 21:18

## 2023-08-14 RX ADMIN — SODIUM CHLORIDE, PRESERVATIVE FREE 10 ML: 5 INJECTION INTRAVENOUS at 21:19

## 2023-08-14 RX ADMIN — SODIUM CHLORIDE: 9 INJECTION, SOLUTION INTRAVENOUS at 14:24

## 2023-08-14 RX ADMIN — CEFAZOLIN 2 G: 1 INJECTION, POWDER, FOR SOLUTION INTRAVENOUS at 14:09

## 2023-08-14 RX ADMIN — PROPOFOL 80 MG: 10 INJECTION, EMULSION INTRAVENOUS at 14:04

## 2023-08-14 RX ADMIN — CEFAZOLIN 2000 MG: 2 INJECTION, POWDER, FOR SOLUTION INTRAMUSCULAR; INTRAVENOUS at 07:01

## 2023-08-14 RX ADMIN — TIMOLOL MALEATE 1 DROP: 5 SOLUTION OPHTHALMIC at 21:17

## 2023-08-14 ASSESSMENT — PAIN - FUNCTIONAL ASSESSMENT: PAIN_FUNCTIONAL_ASSESSMENT: 0-10

## 2023-08-14 NOTE — PROGRESS NOTES
Pt ready for transfer to room [facility]. 4T called. Pt's belongings taken up with the pt.

## 2023-08-14 NOTE — PLAN OF CARE
Problem: Discharge Planning  Goal: Discharge to home or other facility with appropriate resources  Outcome: Progressing     Problem: Skin/Tissue Integrity  Goal: Absence of new skin breakdown  Description: 1. Monitor for areas of redness and/or skin breakdown  2. Assess vascular access sites hourly  3. Every 4-6 hours minimum:  Change oxygen saturation probe site  4. Every 4-6 hours:  If on nasal continuous positive airway pressure, respiratory therapy assess nares and determine need for appliance change or resting period.   Outcome: Progressing     Problem: Safety - Adult  Goal: Free from fall injury  Outcome: Progressing     Problem: ABCDS Injury Assessment  Goal: Absence of physical injury  Outcome: Progressing     Problem: Neurosensory - Adult  Goal: Achieves stable or improved neurological status  Outcome: Progressing  Goal: Absence of seizures  Outcome: Progressing  Goal: Remains free of injury related to seizures activity  Outcome: Progressing     Problem: Respiratory - Adult  Goal: Achieves optimal ventilation and oxygenation  Outcome: Progressing     Problem: Skin/Tissue Integrity - Adult  Goal: Skin integrity remains intact  Outcome: Progressing  Goal: Oral mucous membranes remain intact  Outcome: Progressing     Problem: Musculoskeletal - Adult  Goal: Return mobility to safest level of function  Outcome: Progressing  Goal: Return ADL status to a safe level of function  Outcome: Progressing     Problem: Genitourinary - Adult  Goal: Absence of urinary retention  Outcome: Progressing     Problem: Pain  Goal: Verbalizes/displays adequate comfort level or baseline comfort level  Outcome: Progressing     Problem: Nutrition Deficit:  Goal: Optimize nutritional status  Outcome: Progressing

## 2023-08-14 NOTE — PLAN OF CARE
Problem: Discharge Planning  Goal: Discharge to home or other facility with appropriate resources  8/14/2023 1003 by Dario Thibodeaux RN  Outcome: Progressing  8/14/2023 0627 by Maggy Cunningham RN  Outcome: Progressing     Problem: Skin/Tissue Integrity  Goal: Absence of new skin breakdown  Description: 1. Monitor for areas of redness and/or skin breakdown  2. Assess vascular access sites hourly  3. Every 4-6 hours minimum:  Change oxygen saturation probe site  4. Every 4-6 hours:  If on nasal continuous positive airway pressure, respiratory therapy assess nares and determine need for appliance change or resting period.   8/14/2023 1003 by Dario Thibodeaux RN  Outcome: Progressing  8/14/2023 0627 by Maggy Cunningham RN  Outcome: Progressing     Problem: Safety - Adult  Goal: Free from fall injury  8/14/2023 1003 by Dario Thibodeaux RN  Outcome: Progressing  8/14/2023 0627 by Maggy Cunningham RN  Outcome: Progressing     Problem: ABCDS Injury Assessment  Goal: Absence of physical injury  8/14/2023 1003 by Dario Thibodeaux RN  Outcome: Progressing  8/14/2023 0627 by Maggy Cunningham RN  Outcome: Progressing     Problem: Neurosensory - Adult  Goal: Achieves stable or improved neurological status  8/14/2023 1003 by Dario Thibodeaux RN  Outcome: Progressing  8/14/2023 0627 by Maggy Cunningham RN  Outcome: Progressing  Goal: Absence of seizures  8/14/2023 1003 by Dario Thibodeaux RN  Outcome: Progressing  8/14/2023 0627 by Maggy Cunningham RN  Outcome: Progressing  Goal: Remains free of injury related to seizures activity  8/14/2023 1003 by Dario Thibodeaux RN  Outcome: Progressing  8/14/2023 0627 by Maggy Cunningham RN  Outcome: Progressing     Problem: Respiratory - Adult  Goal: Achieves optimal ventilation and oxygenation  8/14/2023 1003 by Dario Thibodeaux RN  Outcome: Progressing  8/14/2023 0627 by Maggy Cunningham RN  Outcome: Progressing     Problem: Skin/Tissue Integrity - Adult  Goal: Skin integrity remains intact  8/14/2023 1003 by Nelly Mcduffie RN  Outcome: Progressing  8/14/2023 0627 by Khalif Bautista RN  Outcome: Progressing  Goal: Oral mucous membranes remain intact  8/14/2023 1003 by Nelly Mcduffie RN  Outcome: Progressing  8/14/2023 0627 by Khalif Bautista RN  Outcome: Progressing     Problem: Musculoskeletal - Adult  Goal: Return mobility to safest level of function  8/14/2023 1003 by Nelly Mcduffie RN  Outcome: Progressing  8/14/2023 0627 by Khalif Bautista RN  Outcome: Progressing  Goal: Return ADL status to a safe level of function  8/14/2023 1003 by Nelly Mcduffie RN  Outcome: Progressing  8/14/2023 0627 by Khalif Bautista RN  Outcome: Progressing     Problem: Genitourinary - Adult  Goal: Absence of urinary retention  8/14/2023 1003 by Nelly Mcduffie RN  Outcome: Progressing  8/14/2023 0627 by Khalif Bautista RN  Outcome: Progressing     Problem: Pain  Goal: Verbalizes/displays adequate comfort level or baseline comfort level  8/14/2023 1003 by Nelly Mcduffie RN  Outcome: Progressing  8/14/2023 0627 by Khalif Bautista RN  Outcome: Progressing     Problem: Nutrition Deficit:  Goal: Optimize nutritional status  8/14/2023 1003 by Nelly Mcduffie RN  Outcome: Progressing  8/14/2023 0627 by Khalif Bautista RN  Outcome: Progressing

## 2023-08-14 NOTE — PROGRESS NOTES
Shift assessment complete, VSS, A&Ox4, medications given per MAR. Patient denies having any pain, nausea or vomiting. All safety precautions in place, call light, and belongings within reach. Patient remains NPO at this time. The care plan and education has been reviewed and mutually agreed upon with the patient.

## 2023-08-14 NOTE — PROGRESS NOTES
[address] [facility]  HEART FAILURE  Progress Note      Admit Date 8/5/2023     Reason for Consult:      Reason for Consultation/Chief Complaint: dizziness    HPI:    Cam Roberts is a 81 y.o. male with PMH HTN, dementia, GERD BPH, new dx CMP admitted with dizziness and fall. On admission, he was found to have fecal impaction, possible UTI and aspiration pneumonia. Had upper endoscopy on 8/9 that was normal. His bNP increased from 1400 to 6500 after hydration. He is 5 liters positive after giving him lots of fluids. However, he is still not that short of breath. New onset AF/RVR      Subjective:  Patient is being seen for CHF. There were no acute overnight cardiac events. Today Mr. Brenda Heart denies chest pain, shortness of breath, palpitations, or dizziness and feels well. Review of Systems - General ROS: negative  Respiratory ROS: no cough, shortness of breath, or wheezing  Cardiovascular ROS: no chest pain or dyspnea on exertion  Gastrointestinal ROS: no abdominal pain, change in bowel habits, or black or bloody stools  Musculoskeletal ROS: negative  Neurological ROS: no TIA or stroke symptoms     Baseline Weight: 150 hosp scale   Wt Readings from Last 3 Encounters:   08/14/23 150 lb 4.2 oz (68.2 kg)   02/16/23 149 lb 6.4 oz (67.8 kg)   07/29/21 168 lb (76.2 kg)         Cardiac Testing:   ECHO:  8/9/23   Summary   Mild-to-moderately reduced global systolic function with an ejection   fraction estimated at 40%. Global hypokinesis. Definity imaging enhancing agent administered. The right ventricle is dilated and hypokinetic. Right ventricular systolic function appears to be reduced. Mild right atrial enlargement. Aortic valve appears sclerotic but opens adequately. No evidence of aortic   valve regurgitation. Mildly thickened mitral valve without evidence of stenosis. There is mild mitral regurgitation. There is moderate tricuspid regurgitation with a RVSP estimation of 44 mmHg.    IVC

## 2023-08-14 NOTE — BRIEF OP NOTE
Brief Postoperative Note      Patient: Dimitrios Nix  YOB: 1935  MRN: [id number]    Date of Procedure: 8/14/2023    Pre-Op Diagnosis Codes:     * Cholecystitis [K81.9]    Post-Op Diagnosis: Same       Procedure(s):  CHOLECYSTECTOMY LAPAROSCOPIC CHOLANGIOGRAM    Surgeon(s):  Eleanor Coronel MD    Assistant:  Surgical Assistant: Bia Corporal    Anesthesia: General    Estimated Blood Loss (mL): Minimal    Complications: None    Specimens:   ID Type Source Tests Collected by Time Destination   A : A- GALLBLADDER AND CONTENTS Tissue Tissue SURGICAL PATHOLOGY Eleanor Coronel MD 8/14/2023 1422        Implants:  * No implants in log *      Drains:   Urinary Catheter 08/08/23 Coude (Active)   $ Urethral catheter insertion $ Not inserted for procedure 08/08/23 0526   Catheter Indications Urinary retention (acute or chronic), continuous bladder irrigation or bladder outlet obstruction 08/14/23 0930   Site Assessment No urethral drainage 08/14/23 0930   Urine Color Paulina 08/14/23 0930   Urine Appearance Clear 08/14/23 0930   Collection Container Standard 08/12/23 0603   Securement Method Securing device (Describe) 08/12/23 0603   Catheter Care  Other (comment) 08/14/23 0556   Catheter Best Practices  Drainage tube clipped to bed 08/12/23 0603   Status Draining 08/14/23 0930   Output (mL) 100 mL 08/14/23 0930       [REMOVED] Urinary Catheter 08/05/23 Masterson (Removed)   Catheter Indications Urinary retention (acute or chronic), continuous bladder irrigation or bladder outlet obstruction 08/07/23 0822   Site Assessment Other (Comment) 08/07/23 0822   Urine Color Yellow 08/07/23 0822   Urine Appearance Hazy 08/07/23 0822   Collection Container Standard 08/07/23 0546   Securement Method Securing device (Describe) 08/07/23 0822   Catheter Care  Perineal wipes 08/07/23 0815   Catheter Best Practices  Drainage tube clipped to bed;Catheter secured to thigh; Tamper seal intact 08/07/23 0822   Status

## 2023-08-14 NOTE — ANESTHESIA PRE PROCEDURE
Department of Anesthesiology  Preprocedure Note       Name:  Humberto Gibson   Age:  81 y.o.  :  1935                                          MRN:  [id number]         Date:  2023      Surgeon: Leny Gordon):  Emma Dale MD    Procedure: Procedure(s):  CHOLECYSTECTOMY LAPAROSCOPIC CHOLANGIOGRAM    Medications prior to admission:   Prior to Admission medications    Medication Sig Start Date End Date Taking?  Authorizing Provider   omeprazole (PRILOSEC) 20 MG delayed release capsule TAKE 1 CAPSULE BY MOUTH TWICE A DAY 23   Rhea Maldonado MD   benzonatate (TESSALON) 100 MG capsule Take 1 capsule by mouth 3 times daily as needed for Cough    Historical Provider, MD   escitalopram (LEXAPRO) 20 MG tablet Take 2 tablets by mouth daily    Historical Provider, MD   bisacodyl (DULCOLAX) 5 MG EC tablet Take 1 tablet by mouth daily as needed for Constipation    Historical Provider, MD   Calcium Carbonate Antacid (CALCIUM CARBONATE PO) Take 1 tablet by mouth daily    Historical Provider, MD   Colloidal Oatmeal (EUCERIN BABY ECZEMA RELIEF) 1 % CREA Apply topically as needed    Historical Provider, MD   melatonin 3 MG TABS tablet Take 1 tablet by mouth nightly as needed    Historical Provider, MD   polyethylene glycol (GLYCOLAX) 17 g packet Take 1 packet by mouth daily as needed for Constipation    Historical Provider, MD   aluminum & magnesium hydroxide-simethicone (MAALOX) 200-200-20 MG/5ML SUSP suspension Take 30 mLs by mouth every 6 hours as needed for Indigestion    Historical Provider, MD   acetaminophen (TYLENOL) 500 MG tablet Take 2 tablets by mouth every 8 hours as needed for Pain    Historical Provider, MD   tamsulosin (FLOMAX) 0.4 MG capsule TAKE 2 CAPSULES BY MOUTH EVERY DAY 23   Rhea Maldonado MD   finasteride (PROSCAR) 5 MG tablet TAKE 1 TABLET BY MOUTH EVERY DAY 23   Rhea Maldonado MD   Incontinence Supply Disposable MISC Adult incontinence brief size XL Use as directed

## 2023-08-14 NOTE — PROGRESS NOTES
Pt arrived from OR to PACU bay 3. Report received from OR staff. Pt arouses to voice. Surgical glue in place to abdomen. Pt on 3 L NC, NSR, VSS. Will continue to monitor.

## 2023-08-14 NOTE — PROGRESS NOTES
[facility]    8/14/2023 10:36 AM        Name: Elie Phipps . Admitted: 8/5/2023  Primary Care Provider: Myrtle Holman MD (Tel: [phone])      Subjective:  . Admitted with fall/ dizziness   Noted to have possible UTI/ Aspiration PNA  And fecal impaction. Had EGD Wednesday morning. EGD negative for stricture. Has gallstones but no acute cholecystitis. He did develop afib but has subsequently converted to NSR the evening of 8/11. He has been intermittently nauseated with poor po intake. Going to OR for lap faith today. He is agreeable. Still complaints of nausea today. Lives in Assisted living at [place]. Daughter is very involved in his care. I spoke to daughter Thursday at length about plan of care.  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)  Reviewed interval ancillary notes    Current Medications  potassium chloride 10 mEq/100 mL IVPB (Peripheral Line), Q1H  lisinopril (PRINIVIL;ZESTRIL) tablet 20 mg, Daily  metoprolol tartrate (LOPRESSOR) tablet 37.5 mg, Q6H  dextrose 5 % solution, Continuous  sodium phosphate (FLEET) rectal enema 1 enema, Once  potassium chloride (KLOR-CON M) extended release tablet 40 mEq, PRN   Or  potassium bicarb-citric acid (EFFER-K) effervescent tablet 40 mEq, PRN   Or  potassium chloride 10 mEq/100 mL IVPB (Peripheral Line), PRN  magnesium sulfate 2000 mg in 50 mL IVPB premix, PRN  [Held by provider] enoxaparin (LOVENOX) injection 70 mg, BID  furosemide (LASIX) tablet 20 mg, Daily  sennosides-docusate sodium (SENOKOT-S) 8.6-50 MG tablet 2 tablet, Daily PRN  hydrALAZINE (APRESOLINE) injection 10 mg, Q4H PRN  labetalol (NORMODYNE;TRANDATE) injection 10 mg, Q4H PRN  benzocaine-menthol (CEPACOL SORE THROAT) lozenge 1 lozenge, Q2H PRN  aluminum & magnesium hydroxide-simethicone (MAALOX) 200-200-20 MG/5ML suspension 30 mL, Q6H sounds  Musculoskeletal: No cyanosis in digits, neck supple  Neurology: CN 2-12 grossly intact. No speech or motor deficits  Psych: Normal affect. Alert and oriented in time, place and person  Skin: Warm, dry, normal turgor    Labs and Tests:  CBC:   Recent Labs     08/13/23  0957 08/14/23  0738   WBC 7.2 6.8   HGB 12.8* 12.3*    225       BMP:    Recent Labs     08/12/23  0427 08/13/23  0957 08/14/23  0738   * 147* 142   K 4.0 3.7 3.4*   * 111* 107   CO2 26 26 26   BUN 29* 22* 19   CREATININE 1.1 0.8 0.8   GLUCOSE 106* 122* 99       Hepatic:   Recent Labs     08/13/23  0957   AST 32   ALT 45*   BILITOT 0.6   ALKPHOS 102         Urine culture:    Routine <50,000 CFU/ml mixed skin/urogenital dorothy. No further workup      CT head:  No acute intracranial abnormality. CT abd pelvis :  1. Patchy consolidation at the right lung base is suspicious for pneumonia. 2. Cholelithiasis. 3. Large amount of stool in the rectum. 4. Status post left inguinal hernia repair with recurrent small-moderate  fat-containing hernia extending into the scrotum. Small fat-containing right  inguinal hernia. Chest xray:  No radiographic evidence of acute pulmonary disease. CT chest  1. Diffuse right lung pneumonia. Follow-up radiographs to resolution are  needed. 2. Bilateral pleural effusions.     Problem List  Principal Problem:    UTI (urinary tract infection)  Active Problems:    Severe malnutrition (HCC)    BPH with obstruction/lower urinary tract symptoms    Essential hypertension, benign    Late onset Alzheimer's disease without behavioral disturbance (HCC)    REYES (acute kidney injury) ([address])    Bilateral lower extremity edema    Complicated UTI (urinary tract infection)    Chronic combined systolic and diastolic heart failure (HCC)    Hypervolemia    Chronic combined systolic and diastolic congestive heart failure (HCC)    Calculus of gallbladder with chronic cholecystitis without obstruction  Resolved

## 2023-08-14 NOTE — PROGRESS NOTES
Kathryne Palm, MD Libbie Boeck, MD Gunnar Bers, MD                                  Office: [phone]                 Fax: [phone]          [facility]                     NEPHROLOGY INPATIENT PROGRESS NOTE:     PATIENT NAME: Cipriano Chen  : 1935  MRN: [id number]      Name:  Cipriano Chen Date/Time of Admission: 2023 10:53 AM    CSN: [id number] Attending Provider: Ann Em MD   Room/Bed: ASC OR/NONE : 1935 Age: 81 y.o. Subjective / interval history / nephrology update / medical decision making:   Patient was seen comfortably in bed,   no active complaints/distress,   Renal labs noted. Doing better. Less hypotensive. Good urine output. Blood pressure is elevated. Medications reviewed and appropriate. Stable from renal    Hypernatremia is improving  Hypokalemia mild          Medical decision making- high complexity. Multiple complex health problems. Thank you for allowing me to participate in this patient's care. Please do not hesitate to contact me for any questions/concerns. We will follow along with you. Maxim Steward MD  [facility] [address]   Phone: [phone] or [facility] [employer]  Fax: [phone]    Severally ill, at risk of impending organ failure needing  higher level of care/monitoring. Time spent that included face-to-face meeting/discussion with patient, patient's family -as available, and treatment team (including primary/referring team and other consultants; included coordination of care with the treatment team; and review of patient's electronic medical records and ordering appropriates tests. Abelino Buchanan 81 y.o. Has been admitted with multiple complaints of generalized weakness nausea and vomiting. Surgical service evaluating. Possible cholecystitis. Appreciate assistance from urology team with catheter placement. Borderline hypotension.   Urine output improving after trauma TECHNOLOGIST PROVIDED HISTORY: Reason for exam:->trauma Has a \"code stroke\" or \"stroke alert\" been called? ->No Decision Support Exception - unselect if not a suspected or confirmed emergency medical condition->Emergency Medical Condition (MA) Reason for Exam: Trauma, Fall ([place] ems from [facility] d/t fall that happened last night. Pt states he does not remember falling. Pt alert and oriented during triage ). Dizziness (Pt states he also endorses dizziness and overall weakness upon arrival to the ER. Also had 2 episodes of emesis PTA and upon arrival. Pt [facility]). FINDINGS: BRAIN/VENTRICLES: There is no acute intracranial hemorrhage, mass effect or midline shift. No abnormal extra-axial fluid collection. The gray-white differentiation is maintained without evidence of an acute infarct. There is no evidence of hydrocephalus. There are chronic age-appropriate atrophic changes. ORBITS: The visualized portion of the orbits demonstrate no acute abnormality. SINUSES: The visualized paranasal sinuses and mastoid air cells demonstrate no acute abnormality. SOFT TISSUES/SKULL:  No acute abnormality of the visualized skull or soft tissues. No acute intracranial abnormality. CT CHEST WO CONTRAST    Result Date: 8/8/2023  EXAMINATION: CT OF THE CHEST WITHOUT CONTRAST 8/8/2023 3:07 pm TECHNIQUE: CT of the chest was performed without the administration of intravenous contrast. Multiplanar reformatted images are provided for review. Automated exposure control, iterative reconstruction, and/or weight based adjustment of the mA/kV was utilized to reduce the radiation dose to as low as reasonably achievable. COMPARISON: 01/07/2016 HISTORY: ORDERING SYSTEM PROVIDED HISTORY: pneumonia TECHNOLOGIST PROVIDED HISTORY: Reason for exam:->pneumonia Reason for Exam: pneumonia FINDINGS: Mediastinum: Atherosclerotic changes of the aorta. Negative for aneurysm. Heart size is enlarged.   Mild coronary artery

## 2023-08-14 NOTE — PROGRESS NOTES
[facility]   Electrophysiology Progress Note     Date: 8/14/2023  Admit Date: 8/5/2023     Reason for consultation: Atrial Fibrillation with RVR    Chief Complaint:   Chief Complaint   Patient presents with    Jonah Sanders ems from [facility] d/t fall that happened last night. Pt states he does not remember falling. Pt alert and oriented during triage     Dizziness     Pt states he also endorses dizziness and overall weakness upon arrival to the ER. Also had 2 episodes of emesis PTA and upon arrival. Pt [facility]       History of Present Illness: History obtained from patient and medical record. Jan Brown is a 81 y.o. male with a history of late onset Alzheimer's disease, glaucoma, GERD, BPH and urinary incontinence. Interval Hx: Today he is sleeping in bed asking when his surgery is. He denies chest pain, palpitations, shortness of breath or dizziness. He still reports intermittent nausea and reports he is having his gallbladder removed today. He lives in an assisted living facility, and states he uses a walker and denies any falls in the last year. He does not recall having a fall prior to admission. He was seen and examined. Clinical notes reviewed. Telemetry reviewed, sinus rhythm with PVCs this morning. Assessment and Plan:     New Onset Paroxysmal Atrial Fibrillation/Atrial Flutter   - new onset documented 8/10  - no prior documented history  - converted to sinus rhythm 8/11 around 3p  -  continue Metoprolol 37.5 mg every 6h  - TSH-  2.07 (2/23)  - K- 3.4, Mg- 2.2, Cr- 0.8,  ALT- 45, AST- 32  - therapeutic Lovenox for high [id number], can change to Eliqis 5 mg BID at time of discharge. We discussed the risks of bleeding, he denies any history of GIB,hematuria or frequent falls. Lovenox is currently on hold for lap cholecystectomy.  May resume when okay per General Surgery    - will need to follow up with EP after discharge as new patient    [id number] for infiltrate. Right  pleural effusion. CT Chest WO Contrast 8/8/23  FINDINGS:  Mediastinum: Atherosclerotic changes of the aorta. Negative for aneurysm. Heart size is enlarged. Mild coronary artery calcifications. Negative for  pericardial effusion. Small hiatus hernia. Lungs/pleura: Bilateral pleural effusions. Right is larger than the left and  it is moderate. Consolidation in the lower lobes bilaterally but much more  prominent on the right. Consolidation also in the right middle lobe and the  right upper lobe. Multiple air bronchograms. Ground-glass opacities at the  right lung apex. Calcified apical pleural thickening bilaterally. The  trachea and bronchi are patent. Upper Abdomen: Gastric wall appears to be thickened but is incompletely  evaluated. Soft Tissues/Bones: Scoliosis. Moderate osteoarthritic change of the  sternoclavicular joints. Mild glenohumeral arthropathy. Spondylosis. No  lytic destructive lesions. IMPRESSION:  1. Diffuse right lung pneumonia. Follow-up radiographs to resolution are  needed. 2. Bilateral pleural effusions.        Problem List:   Patient Active Problem List    Diagnosis Date Noted    Glaucoma 02/16/2023    Refused influenza vaccine 02/16/2023    General weakness 02/16/2023    Hypernatremia 02/17/2022    Hypokalemia 02/17/2022    Severe malnutrition ([address]) 02/17/2022    Failure to thrive in adult 02/17/2022    Age-related macular degeneration 05/04/2021    Chronic combined systolic and diastolic congestive heart failure ([address]) 08/12/2023    Calculus of gallbladder with chronic cholecystitis without obstruction 08/12/2023    Chronic combined systolic and diastolic heart failure ([address]) 08/10/2023    Hypervolemia 08/10/2023    UTI (urinary tract infection) 08/05/2023    REYES (acute kidney injury) ([address]) 08/05/2023    Bilateral lower extremity edema 64/78/5328    Complicated UTI (urinary tract infection) 08/05/2023    Other secondary kyphosis,

## 2023-08-14 NOTE — PROGRESS NOTES
SLP ALL NOTES  HOLD  Chris Cortes  1935    Attempted to see pt for SLP dysphagia therapy follow-up. Per discussion with RN, pt being held NPO at this time for potential procedure. Will hold therapy and re-attempt to follow-up as pt is appropriate to participate.      Thanks,  David , [id number]  Speech Language Pathologist

## 2023-08-14 NOTE — PROGRESS NOTES
Physical Therapy    Pt is off the floor laparoscopic cholecystectomy today with Dr. Vee Escalera. Will follow-up post-op.     Marlene Talbot [place], DPT [id number]

## 2023-08-14 NOTE — ANESTHESIA POSTPROCEDURE EVALUATION
Department of Anesthesiology  Postprocedure Note    Patient: Margarita Zhao  MRN: [id number]  YOB: 1935  Date of evaluation: 8/14/2023      Procedure Summary     Date: 08/14/23 Room / Location: [address]    Anesthesia Start: 6949 Anesthesia Stop: 1112    Procedure: CHOLECYSTECTOMY LAPAROSCOPIC CHOLANGIOGRAM (Abdomen) Diagnosis:       Cholecystitis      (Cholecystitis [K81.9])    Surgeons: Sage Gaitan MD Responsible Provider: Vitaliy Tee MD    Anesthesia Type: general ASA Status: 3          Anesthesia Type: No value filed.     Gatito Phase I: Gatito Score: 10    Gatito Phase II:        Anesthesia Post Evaluation    Patient location during evaluation: PACU  Patient participation: complete - patient participated  Level of consciousness: awake and alert  Airway patency: patent  Nausea & Vomiting: no vomiting and no nausea  Complications: no  Cardiovascular status: hemodynamically stable  Respiratory status: acceptable  Hydration status: stable  Pain management: adequate

## 2023-08-14 NOTE — PROGRESS NOTES
Patient arrived from PACU, bedside report complete, VSS, patient arouses to voice. Incisions to abdomen CDI, WNL. Patient denies pain or further needs at this time.

## 2023-08-15 VITALS
WEIGHT: 151.24 LBS | SYSTOLIC BLOOD PRESSURE: 109 MMHG | BODY MASS INDEX: 21.17 KG/M2 | RESPIRATION RATE: 17 BRPM | TEMPERATURE: 98.2 F | OXYGEN SATURATION: 95 % | DIASTOLIC BLOOD PRESSURE: 51 MMHG | HEIGHT: 71 IN | HEART RATE: 62 BPM

## 2023-08-15 PROCEDURE — 99024 POSTOP FOLLOW-UP VISIT: CPT | Performed by: SURGERY

## 2023-08-15 PROCEDURE — 92526 ORAL FUNCTION THERAPY: CPT

## 2023-08-15 PROCEDURE — 6370000000 HC RX 637 (ALT 250 FOR IP): Performed by: SURGERY

## 2023-08-15 PROCEDURE — 6370000000 HC RX 637 (ALT 250 FOR IP): Performed by: NURSE PRACTITIONER

## 2023-08-15 PROCEDURE — APPSS15 APP SPLIT SHARED TIME 0-15 MINUTES: Performed by: NURSE PRACTITIONER

## 2023-08-15 PROCEDURE — 99231 SBSQ HOSP IP/OBS SF/LOW 25: CPT | Performed by: NURSE PRACTITIONER

## 2023-08-15 PROCEDURE — APPNB30 APP NON BILLABLE TIME 0-30 MINS: Performed by: NURSE PRACTITIONER

## 2023-08-15 PROCEDURE — 99232 SBSQ HOSP IP/OBS MODERATE 35: CPT | Performed by: NURSE PRACTITIONER

## 2023-08-15 RX ORDER — METOPROLOL SUCCINATE 50 MG/1
50 TABLET, EXTENDED RELEASE ORAL NIGHTLY
Qty: 30 TABLET | Refills: 3 | Status: SHIPPED | OUTPATIENT
Start: 2023-08-15

## 2023-08-15 RX ORDER — FUROSEMIDE 20 MG/1
20 TABLET ORAL DAILY
Qty: 60 TABLET | Refills: 1 | Status: SHIPPED | OUTPATIENT
Start: 2023-08-16

## 2023-08-15 RX ORDER — HYDROCODONE BITARTRATE AND ACETAMINOPHEN 5; 325 MG/1; MG/1
1 TABLET ORAL EVERY 6 HOURS PRN
Qty: 10 TABLET | Refills: 0 | Status: SHIPPED | OUTPATIENT
Start: 2023-08-15 | End: 2023-08-15 | Stop reason: HOSPADM

## 2023-08-15 RX ORDER — LISINOPRIL 20 MG/1
20 TABLET ORAL DAILY
Qty: 30 TABLET | Refills: 3 | Status: SHIPPED | OUTPATIENT
Start: 2023-08-16

## 2023-08-15 RX ORDER — SPIRONOLACTONE 25 MG/1
12.5 TABLET ORAL DAILY
Qty: 30 TABLET | Refills: 1 | Status: SHIPPED | OUTPATIENT
Start: 2023-08-16

## 2023-08-15 RX ORDER — METOPROLOL SUCCINATE 50 MG/1
50 TABLET, EXTENDED RELEASE ORAL NIGHTLY
Status: DISCONTINUED | OUTPATIENT
Start: 2023-08-15 | End: 2023-08-15 | Stop reason: HOSPADM

## 2023-08-15 RX ADMIN — SPIRONOLACTONE 12.5 MG: 25 TABLET ORAL at 09:25

## 2023-08-15 RX ADMIN — METOPROLOL TARTRATE 37.5 MG: 25 TABLET, FILM COATED ORAL at 05:03

## 2023-08-15 RX ADMIN — DORZOLAMIDE HCL 1 DROP: 20 SOLUTION/ DROPS OPHTHALMIC at 09:26

## 2023-08-15 RX ADMIN — LISINOPRIL 20 MG: 20 TABLET ORAL at 09:25

## 2023-08-15 RX ADMIN — TAMSULOSIN HYDROCHLORIDE 0.8 MG: 0.4 CAPSULE ORAL at 09:24

## 2023-08-15 RX ADMIN — PANTOPRAZOLE SODIUM 40 MG: 40 TABLET, DELAYED RELEASE ORAL at 15:30

## 2023-08-15 RX ADMIN — TIMOLOL MALEATE 1 DROP: 5 SOLUTION OPHTHALMIC at 09:25

## 2023-08-15 RX ADMIN — APIXABAN 5 MG: 5 TABLET, FILM COATED ORAL at 15:30

## 2023-08-15 RX ADMIN — OXYCODONE HYDROCHLORIDE AND ACETAMINOPHEN 1 TABLET: 5; 325 TABLET ORAL at 20:01

## 2023-08-15 RX ADMIN — ESCITALOPRAM OXALATE 40 MG: 10 TABLET ORAL at 09:24

## 2023-08-15 RX ADMIN — POLYETHYLENE GLYCOL 3350 17 G: 17 POWDER, FOR SOLUTION ORAL at 09:21

## 2023-08-15 RX ADMIN — METOPROLOL TARTRATE 37.5 MG: 25 TABLET, FILM COATED ORAL at 09:24

## 2023-08-15 RX ADMIN — FUROSEMIDE 20 MG: 20 TABLET ORAL at 09:24

## 2023-08-15 RX ADMIN — FINASTERIDE 5 MG: 5 TABLET, FILM COATED ORAL at 09:24

## 2023-08-15 RX ADMIN — PANTOPRAZOLE SODIUM 40 MG: 40 TABLET, DELAYED RELEASE ORAL at 05:03

## 2023-08-15 ASSESSMENT — PAIN SCALES - GENERAL: PAINLEVEL_OUTOF10: 6

## 2023-08-15 NOTE — PROGRESS NOTES
[facility]    8/15/2023 7:49 AM        Name: Anabella Ugadle . Admitted: 8/5/2023  Primary Care Provider: Guerita Hua MD (Tel: [phone])      Subjective:      POD # 1 from faith  Seen this am after eating Italian toast for breakfast.  Reports that he feel well and reports that his swallowing is improved. Admitted with fall/ dizziness   Noted to have possible UTI/ Aspiration PNA  And fecal impaction. Had EGD  negative for stricture. He did develop afib but has subsequently converted to NSR the evening of 8/11  Has occasional VT , non symptomatic.   He is followed by cardiology and EP         Current Medications  spironolactone (ALDACTONE) tablet 12.5 mg, Daily  morphine (PF) injection 2 mg, Q4H PRN  acetaminophen (TYLENOL) tablet 1,000 mg, Q6H PRN  oxyCODONE-acetaminophen (PERCOCET) 5-325 MG per tablet 1 tablet, Q8H PRN  lisinopril (PRINIVIL;ZESTRIL) tablet 20 mg, Daily  metoprolol tartrate (LOPRESSOR) tablet 37.5 mg, Q6H  dextrose 5 % solution, Continuous  sodium phosphate (FLEET) rectal enema 1 enema, Once  potassium chloride (KLOR-CON M) extended release tablet 40 mEq, PRN   Or  potassium bicarb-citric acid (EFFER-K) effervescent tablet 40 mEq, PRN   Or  potassium chloride 10 mEq/100 mL IVPB (Peripheral Line), PRN  magnesium sulfate 2000 mg in 50 mL IVPB premix, PRN  [Held by provider] enoxaparin (LOVENOX) injection 70 mg, BID  furosemide (LASIX) tablet 20 mg, Daily  sennosides-docusate sodium (SENOKOT-S) 8.6-50 MG tablet 2 tablet, Daily PRN  hydrALAZINE (APRESOLINE) injection 10 mg, Q4H PRN  labetalol (NORMODYNE;TRANDATE) injection 10 mg, Q4H PRN  benzocaine-menthol (CEPACOL SORE THROAT) lozenge 1 lozenge, Q2H PRN  aluminum & magnesium hydroxide-simethicone (MAALOX) 200-200-20 MG/5ML suspension 30 mL, Q6H PRN  benzonatate (TESSALON) capsule 100 mg, TID PRN  bisacodyl (DULCOLAX) EC tablet 5 mg, Daily PRN  escitalopram (LEXAPRO) tablet 40 mg,

## 2023-08-15 NOTE — PLAN OF CARE
Problem: Discharge Planning  Goal: Discharge to home or other facility with appropriate resources  8/14/2023 2227 by Faisal Larios RN  Outcome: Progressing     Problem: Skin/Tissue Integrity  Goal: Absence of new skin breakdown  Description: 1. Monitor for areas of redness and/or skin breakdown  2. Assess vascular access sites hourly  3. Every 4-6 hours minimum:  Change oxygen saturation probe site  4. Every 4-6 hours:  If on nasal continuous positive airway pressure, respiratory therapy assess nares and determine need for appliance change or resting period.   8/14/2023 2227 by Faisal Larios RN  Outcome: Progressing     Problem: Safety - Adult  Goal: Free from fall injury  8/14/2023 2227 by Faisal Larios RN  Outcome: Progressing     Problem: ABCDS Injury Assessment  Goal: Absence of physical injury  8/14/2023 2227 by Faisal Larios RN  Outcome: Progressing     Problem: Neurosensory - Adult  Goal: Achieves stable or improved neurological status  8/15/2023 1028 by Todd Ocasio RN  Outcome: Progressing  8/14/2023 2227 by Faisal Larios RN  Outcome: Progressing  Goal: Absence of seizures  8/15/2023 1028 by Todd Ocasio RN  Outcome: Progressing  8/14/2023 2227 by Faisal Larios RN  Outcome: Progressing  Goal: Remains free of injury related to seizures activity  8/15/2023 1028 by Todd Ocasio RN  Outcome: Progressing  8/14/2023 2227 by Faisal Larios RN  Outcome: Progressing     Problem: Respiratory - Adult  Goal: Achieves optimal ventilation and oxygenation  8/15/2023 1028 by Todd Ocasio RN  Outcome: Progressing  8/14/2023 2227 by Faisal Larios RN  Outcome: Progressing     Problem: Skin/Tissue Integrity - Adult  Goal: Skin integrity remains intact  8/15/2023 1028 by Todd Ocasio RN  Outcome: Progressing  8/14/2023 2227 by Faisal Larios RN  Outcome: Progressing  Goal: Oral mucous membranes remain intact  8/15/2023 1028 by Todd Ocasio RN  Outcome: Progressing  8/14/2023 2227 by Faisal Larios RN  Outcome:

## 2023-08-15 NOTE — DISCHARGE INSTRUCTIONS
[facility]    Discharge Instructions      Activity  - activity as tolerated, no driving while on analgesics, and no heavy lifting for 6 weeks; it is OK to be up walking around--walking up and down stairs is also OK. Do what is comfortable: stop and rest if you feel tired. Diet  - regular diet  - Drink plenty of fluids     Pain  - You may use narcotic pain medication as prescribed for breakthrough pain  - You can use OTC Tylenol or Ibuprofen for 1-2 weeks (may need to adjust the dose as directed on the bottle if enteric coated ibuprofen chosen)  - Avoid taking narcotic pain medication on an empty stomach which may result in nausea, if pain medication is causing nausea try decreasing the dose  - Narcotic pain medication is not necessary, please contact the office if pain is not controlled  - Narcotic pain medication will not be refilled on weekends and after hours  - Please contact the office Monday-Friday 8a-4p if a refill is requested    Nausea  - Avoid taking narcotic pain medication on an empty stomach which may result in nausea  - If pain medication is causing nausea you may try decreasing the dose  - Nausea can be common for 24 hours after surgery--if nausea uncontrolled or worsening, contact the office or go to the ED    Constipation  - Pain medication can be constipating  - Often, it helps to take a stool softener with the goal of at least one soft bowel movement daily with minimal straining  - You may also need to increase water and fiber intake--may supplement with Benefiber and increasing your activity will also be helpful  - If a stool softener is needed, the following OTC medications are recommend: Colace 100 mg by mouth twice daily, Miralax 17 gm by mouth 1-3 times daily with glass of water, dulcolax suppositories, and Fleets enemas    Wound Care  - keep wound clean and dry  - If incisional bleeding is noted, hold firm pressure for 15-20 minutes.  If remains

## 2023-08-15 NOTE — DISCHARGE SUMMARY
General appearance. Alert. Looks comfortable. HEENT. Sclera clear. Moist mucus membranes. Cardiovascular. Regular rate and rhythm, normal S1, S2. No murmur. Respiratory. Not using accessory muscles. Clear to auscultation bilaterally, no wheeze. Gastrointestinal. Abdomen soft, non-tender, not distended, normal bowel sounds, operative lap faith incisions are unremarkable   Neurology. Facial symmetry. No speech deficits. Moving all extremities equally. Extremities. No edema in lower extremities. Skin. Warm, dry, normal turgor    Condition at time of discharge stable     Medication instructions provided to patient at discharge.      Medication List        START taking these medications      apixaban 5 MG Tabs tablet  Commonly known as: ELIQUIS  Take 1 tablet by mouth 2 times daily     furosemide 20 MG tablet  Commonly known as: LASIX  Take 1 tablet by mouth daily  Start taking on: August 16, 2023     lisinopril 20 MG tablet  Commonly known as: PRINIVIL;ZESTRIL  Take 1 tablet by mouth daily  Start taking on: August 16, 2023     metoprolol succinate 50 MG extended release tablet  Commonly known as: TOPROL XL  Take 1 tablet by mouth at bedtime     spironolactone 25 MG tablet  Commonly known as: ALDACTONE  Take 0.5 tablets by mouth daily  Start taking on: August 16, 2023            CONTINUE taking these medications      acetaminophen 500 MG tablet  Commonly known as: TYLENOL     aluminum & magnesium hydroxide-simethicone 200-200-20 MG/5ML Susp suspension  Commonly known as: MAALOX     benzonatate 100 MG capsule  Commonly known as: TESSALON     CALCIUM CARBONATE PO     * bisacodyl 5 MG EC tablet  Commonly known as: DULCOLAX     * [facility] Bisacodyl 5 MG EC tablet  Generic drug: bisacodyl  TAKE 1 TABLET BY MOUTH EVERY DAY AS NEEDED FOR CONSTIPATION TAKE IF NO BOWEL MOVEMENT BY 3RD DAY     DORZOLAMIDE HCL-TIMOLOL MAL OP     escitalopram 20 MG tablet  Commonly known as: LEXAPRO     Eucerin Baby Eczema Relief 1 % Crea  Generic drug: Colloidal Oatmeal     finasteride 5 MG tablet  Commonly known as: PROSCAR  TAKE 1 TABLET BY MOUTH EVERY DAY     Incontinence Supply Disposable Misc  Adult incontinence brief size XL Use as directed     latanoprost 0.005 % ophthalmic solution  Commonly known as: XALATAN     melatonin 3 MG Tabs tablet     omeprazole 20 MG delayed release capsule  Commonly known as: PRILOSEC  TAKE 1 CAPSULE BY MOUTH TWICE A DAY     polyethylene glycol 17 g packet  Commonly known as: GLYCOLAX     tamsulosin 0.4 MG capsule  Commonly known as: FLOMAX  TAKE 2 CAPSULES BY MOUTH EVERY DAY           * This list has 2 medication(s) that are the same as other medications prescribed for you. Read the directions carefully, and ask your doctor or other care provider to review them with you. STOP taking these medications      cyclobenzaprine 5 MG tablet  Commonly known as: FLEXERIL     docusate sodium 100 MG capsule  Commonly known as: COLACE               Where to Get Your Medications        You can get these medications from any pharmacy    Bring a paper prescription for each of these medications  apixaban 5 MG Tabs tablet  furosemide 20 MG tablet  lisinopril 20 MG tablet  metoprolol succinate 50 MG extended release tablet  spironolactone 25 MG tablet         Discharge recommendations given to patient. Follow Up. in 1 week   Disposition. SNF  Activity. activity as tolerated  Diet: ADULT ORAL NUTRITION SUPPLEMENT; Breakfast, Lunch, Dinner; Standard High Calorie/High Protein Oral Supplement  ADULT DIET; Easy to Chew      Spent 35 minutes in discharge process.     Signed:  ENEDINA Hilario CNP     8/15/2023 1:48 PM

## 2023-08-15 NOTE — PROGRESS NOTES
MD Chanelle Garcia MD Farrel Si, MD                                  Office: [phone]                 Fax: [phone]          [employer]                     NEPHROLOGY INPATIENT PROGRESS NOTE:     PATIENT NAME: Jan Brown  : 1935  MRN: [id number]      Name:  Jan Brown Date/Time of Admission: 2023 10:53 AM    CSN: [id number] Attending Provider: Garrison Oreilly MD   Room/Bed: [address] [place] [address] : 1935 Age: 81 y.o. Subjective / interval history / nephrology update / medical decision making:   Patient was seen comfortably in bed during rounds   no active complaints/distress reported to me   Renal labs noted. Doing better. Less hypotensive. Good urine output. Blood pressure is elevated. - better   Medications reviewed and appropriate. Agree w/ adding Aldactone - to helpo w/ K   Continue Lisinopril and Lasix     Mag WNL    Stable from renal for dc w/ current meds    Hypernatremia is improving  Hypokalemia mild          Medical decision making- high complexity. Multiple complex health problems. Thank you for allowing me to participate in this patient's care. Please do not hesitate to contact me for any questions/concerns. We will follow along with you. Aleah Spears MD  [facility] [address]   Phone: [phone] or [facility] [employer]  Fax: [phone]    Severally ill, at risk of impending organ failure needing  higher level of care/monitoring. Time spent that included face-to-face meeting/discussion with patient, patient's family -as available, and treatment team (including primary/referring team and other consultants; included coordination of care with the treatment team; and review of patient's electronic medical records and ordering appropriates tests. Kassandra Fernandez 81 y.o. Has been admitted with multiple complaints of generalized weakness nausea and vomiting.   Surgical service 1 cm PANCREAS:  Pancreas not well visualized. OTHER: No evidence of right upper quadrant ascites. Cholelithiasis without evidence of acute cholecystitis. Nonvisualization of the pancreas secondary to overlying bowel gas. XR CHEST PORTABLE    Result Date: 8/5/2023  EXAMINATION: ONE XRAY VIEW OF THE CHEST 8/5/2023 11:19 am COMPARISON: Chest x-ray dated 04/27/2016. HISTORY: ORDERING SYSTEM PROVIDED HISTORY: dizziness TECHNOLOGIST PROVIDED HISTORY: Reason for exam:->dizziness FINDINGS: HEART/MEDIASTINUM: The cardiomediastinal silhouette is stable. PLEURA/LUNGS: There are no focal consolidations or pleural effusions. There is no appreciable pneumothorax. BONES/SOFT TISSUE: No acute abnormality. No radiographic evidence of acute pulmonary disease. Imaging Results.   Chest X Ray reviwed by me          Electronically Signed:  Lamar Brink MD 8/15/2023

## 2023-08-15 NOTE — DISCHARGE INSTR - DIET

## 2023-08-15 NOTE — PROGRESS NOTES
Nutrition Note    RECOMMENDATIONS  PO Diet: Regular as tolerated  ONS: Ensure TID     NUTRITION ASSESSMENT   Diet advanced to regular s/p lap faith/cholangiogram on 8/14. Pt sleeping soundly upon visit, unable to arouse. Observed 100% bkf tray eaten. Will add Ensure TID as per previous conversation with pt. Will monitor for acceptance of supplements & consistently adequate po intake. Nutrition Related Findings: k+ 3.4; LBM 8/11 per EMR; trace edemaq BLE  Wounds: None  Nutrition Education:  Education not indicated   Nutrition Goals: PO intake 50% or greater, Initiate PO diet     MALNUTRITION ASSESSMENT   Chronic Illness  Malnutrition Status: Severe malnutrition    NUTRITION DIAGNOSIS   Severe malnutrition related to inadequate protein-energy intake, cognitive or neurological impairment (hx alzheimers) as evidenced by nausea, poor intake prior to admission, Criteria as identified in malnutrition assessment      CURRENT NUTRITION THERAPIES  ADULT DIET; Regular  ADULT ORAL NUTRITION SUPPLEMENT; Breakfast, Lunch, Dinner; Standard High Calorie/High Protein Oral Supplement     PO Intake: % (x 1 meal)   PO Supplement Intake:None Ordered    ANTHROPOMETRICS  Current Height: 5' 11\" (180.3 cm)  Current Weight - Scale: 151 lb 3.8 oz (68.6 kg)    Ideal Body Weight (IBW): 172 lbs  (78 kg)    Usual Bodyweight 166 lb (75.3 kg) (154-160 lb recently)       BMI: 21.1      COMPARATIVE STANDARDS  Total Energy Requirements (kcals/day): 1700- 2040     Protein (g):  82- 136g       Fluid (mL/day):  1 ml/kcal\    The patient will be monitored per nutrition standards of care. Consult dietitian if additional nutrition interventions are needed prior to RD reassessment.      Sherine St RD, LD    Contact: [phone]

## 2023-08-15 NOTE — PROGRESS NOTES
Shift assessment completed. Neuro WNL. Denies any pain at this time. AM meds administered per MAR. The care plan and education has been reviewed and mutually agreed upon with the patient. Standard safety measures in place. 5:48 PM  Pt daughter Boni Mabry) was notified at this time about pt's dc and transportation to [facility].     5:51 PM  Called report to [facility] facility at this time; talked to [employer] (nurse). All questions answered. The verbalized understanding. Advised to call back directly if there are further questions, callback number was given.

## 2023-08-15 NOTE — PROGRESS NOTES
Facility/Department: [address] ORTHO/NEURO NURSING  Speech Language Pathology   Dysphagia Treatment Note    Patient: Radha Bello   : 1935   MRN: [id number]      Evaluation Date: 8/15/2023      Admitting Dx: Dehydration [E86.0]  REYES (acute kidney injury) ([address]) [F62.4]  Complicated UTI (urinary tract infection) [N39.0]  Urinary tract infection without hematuria, site unspecified [N39.0]  Nausea and vomiting, unspecified vomiting type [R11.2]  Treatment Diagnosis: Oropharyngeal Dysphagia   Pain: Did not state                                              Diet and Treatment Recommendations 8/15/2023:  Diet Solids Recommendation:  Easy to chew  Liquid Consistency Recommendation: Thin liquids  Recommended form of Meds: Meds whole with water or Meds in puree           Compensatory strategies: Alternate solids/liquids , Upright as possible with all PO intake , Small bites/sips , Eat/feed slowly, Remain upright 30-45 min     Assessment of Texture Tolerance:  Diet level prior to treatment: Regular texture diet , Thin liquids   Tolerance of Current Diet Level:Per chart, no noted difficulty with current diet level     Impressions: Pt was positioned Upright in bed , awake and alert. Currently on room air. Pt with noted increased awareness this date. Pt is missing many teeth, reported having upper plates but does not know where they are. Pt reports no dysphagia after procedure previous date. Trials of thin liquids, puree , and soft solids were provided to assess swallow function. Pt with prolonged mastication of soft solids, reported unable to chew them and spit them out. Pt with adequate laryngeal elevation per texture. One instance of delayed cough noted after presentation of thin liquids by straw. No other overt clinical s/s of aspiration noted. Pt educated pt on Easy [employer] and pt agreed to diet recommendation.  Pt demonstrates increased risk for aspiration due to intermittent confusion, cognitive state, and prior hx of dysphagia. Based on today's assessment recommend Easy to chew  with Thin liquids , Meds whole with water or Meds in puree . Dysphagia Goals:   Pt will functionally tolerate recommended diet with no overt clinical s/s of aspiration (Ongoing 08/15/23)  Pt will functionally tolerate ongoing assessment of swallow function with diet to be determined as indicated (Ongoing 08/15/23)  Pt will demonstrate understanding of aspiration risk and precautions via education/demonstration with occasional prompting (Ongoing 08/15/23)  Pt will advance to least restrictive diet as indicated (Ongoing 08/15/23)    Plan:   3-5 times per week during acute care stay. Patient/Family Education:  Provided education regarding role of SLP, recommendations and general speech pathology plan of care. [x] Pt verbalized understanding and agreement   [x] Pt requires ongoing learning   [] No evidence of comprehension     Discharge Recommendations:    Discharge recommendations to be determined pending ongoing follow-up during acute care stay    Treatment time:  Timed Code Treatment Minutes: 0  Total Treatment time: 15 minutes    If patient discharges prior to next session this note will serve as a discharge summary. Signature: Brian Baca.,  Clinician    The speech-language pathologist was present, directed the patient's care, made skilled judgment and was responsible for assessment and treatment.     Allegra Mckeon M.A., [facility]  Speech-Language Pathologist

## 2023-08-15 NOTE — PLAN OF CARE
Problem: Discharge Planning  Goal: Discharge to home or other facility with appropriate resources  8/14/2023 2227 by Maryan Chase RN  Outcome: Progressing  8/14/2023 1003 by Gopal Garvey RN  Outcome: Progressing     Problem: Skin/Tissue Integrity  Goal: Absence of new skin breakdown  Description: 1. Monitor for areas of redness and/or skin breakdown  2. Assess vascular access sites hourly  3. Every 4-6 hours minimum:  Change oxygen saturation probe site  4. Every 4-6 hours:  If on nasal continuous positive airway pressure, respiratory therapy assess nares and determine need for appliance change or resting period.   8/14/2023 2227 by Maryan Chase RN  Outcome: Progressing  8/14/2023 1003 by Gopal Garvey RN  Outcome: Progressing     Problem: Safety - Adult  Goal: Free from fall injury  8/14/2023 2227 by Maryan Chase RN  Outcome: Progressing  8/14/2023 1003 by Gopal Garvey RN  Outcome: Progressing     Problem: ABCDS Injury Assessment  Goal: Absence of physical injury  8/14/2023 2227 by Maryan Chase RN  Outcome: Progressing  8/14/2023 1003 by Gopal Garvey RN  Outcome: Progressing     Problem: Neurosensory - Adult  Goal: Achieves stable or improved neurological status  8/14/2023 2227 by Maryan Chase RN  Outcome: Progressing  8/14/2023 1003 by Gopal Garvey RN  Outcome: Progressing  Goal: Absence of seizures  8/14/2023 2227 by Maryan Chase RN  Outcome: Progressing  8/14/2023 1003 by Gopal Garvey RN  Outcome: Progressing  Goal: Remains free of injury related to seizures activity  8/14/2023 2227 by Maryan Chase RN  Outcome: Progressing  8/14/2023 1003 by Gopal Garvey RN  Outcome: Progressing     Problem: Respiratory - Adult  Goal: Achieves optimal ventilation and oxygenation  8/14/2023 2227 by Maryan Chase RN  Outcome: Progressing  8/14/2023 1003 by Gopal Garvey RN  Outcome: Progressing     Problem: Skin/Tissue Integrity - Adult  Goal: Skin integrity remains intact  8/14/2023 2227

## 2023-08-15 NOTE — PROGRESS NOTES
Urology Progress Note  [facility]     Patient: Consuelo Pelayo MRN: [id number]  Room/Bed: 6YP-0579/9017-77   YOB: 1935  Age/Sex: 81 y.o.male  Admission Date: 8/5/2023     Date of Service:  8/15/2023    ASSESSMENT/PLAN     1. REYES (acute kidney injury) ([address])    2. Dehydration    3. Nausea and vomiting, unspecified vomiting type    4. Urinary tract infection without hematuria, site unspecified    5. Dysphagia, unspecified type    6. Cholecystitis    7. 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   -Large stool burden noted on CT  ==  Continues to do okay from a urology standpoint. Lou continues to drain CYU    Recommendations:  -Continue proscar and flomax  -Void trial in the coming days in SNF - ANY updated.   -Outpatient follow up if failed VT to consider cysto - follow up requested  -Urology will sign off please call with any questions    All patient questions were answered. He understands the plan as listed above. SUBJECTIVE     Chief Complaint:   Chief Complaint   Patient presents with    Arlester Rough ems from [facility] d/t fall that happened last night. Pt states he does not remember falling. Pt alert and oriented during triage     Dizziness     Pt states he also endorses dizziness and overall weakness upon arrival to the ER. Also had 2 episodes of emesis PTA and upon arrival. Pt [facility]       24 Hour Events: Today patient reports no new concerns, rehabbing. Otherwise symptoms are overall improved and pain is adequately controlled. Denies nausea/vomiting. No other genitourinary symptoms.     OBJECTIVE     Hospital Problem List:  Principal

## 2023-08-15 NOTE — PROGRESS NOTES
[facility]   Electrophysiology Progress Note     Date: 8/15/2023  Admit Date: 8/5/2023     Reason for consultation: Atrial Fibrillation with RVR    Chief Complaint:   Chief Complaint   Patient presents with    Nino James ems from [facility] d/t fall that happened last night. Pt states he does not remember falling. Pt alert and oriented during triage     Dizziness     Pt states he also endorses dizziness and overall weakness upon arrival to the ER. Also had 2 episodes of emesis PTA and upon arrival. Pt [facility]       History of Present Illness: History obtained from patient and medical record. Consuelo Pelayo is a 81 y.o. male with a history of late onset Alzheimer's disease, glaucoma, GERD, BPH and urinary incontinence. Interval Hx: Today he is asking when he can get up in the chair. He denies any nausea or abdominal pain. He reports he slept well overnight ans is eating soft foods this morning. He denies any chest pain, shortness of breath, dizziness or palpitations. He said he follows with his PCP at [facility]. He is reluctant to establish care with Cardiology and EP as he does not think it is necessary. WE spoke at length about his Afib, risk of stroke, blood thinning medications and heart failure. He verbalized understanding and said he would like to establish care at [facility] to consolidate appointments to one place. He will talk this over with his daughter before making a decision. He lives in an assisted living facility, and states he uses a walker and denies any falls in the last year. He does not recall having a fall prior to admission. He was seen and examined. Clinical notes reviewed. Telemetry reviewed,  sinus rhythm noted    No lab results for 8/15. Spoke with Hillary VAN, she called the lab and the orders are now released and will be drawn asap.      Assessment and Plan:     New Onset Paroxysmal Atrial Fibrillation/Atrial Flutter   - new onset TAKE 1 CAPSULE BY MOUTH TWICE A DAY  Tahir Nelson MD   tamsulosin (FLOMAX) 0.4 MG capsule TAKE 2 CAPSULES BY MOUTH EVERY DAY  Tahir Nelson MD   finasteride (PROSCAR) 5 MG tablet TAKE 1 TABLET BY MOUTH EVERY DAY  Tahir Nelson MD   Incontinence Supply Disposable MISC Adult incontinence brief size XL Use as directed  Tahir Nelson MD   docusate sodium (COLACE) 100 MG capsule TAKE 1 CAPSULE BY MOUTH EVERY DAY  Tahir Nelson MD   [facility] BISACODYL 5 MG EC tablet TAKE 1 TABLET BY MOUTH EVERY DAY AS NEEDED FOR CONSTIPATION TAKE IF NO BOWEL MOVEMENT BY 3RD DAY  Tahir Nelson MD   DORZOLAMIDE HCL-TIMOLOL MAL OP Place 1 drop into both eyes 2 times daily  Historical Provider, MD   latanoprost (XALATAN) 0.005 % ophthalmic solution Place 1 drop into both eyes nightly  Historical Provider, MD      Scheduled Meds:   spironolactone  12.5 mg Oral Daily    lisinopril  20 mg Oral Daily    metoprolol tartrate  37.5 mg Oral Q6H    sodium phosphate  1 enema Rectal Once    [Held by provider] enoxaparin  70 mg SubCUTAneous BID    furosemide  20 mg Oral Daily    escitalopram  40 mg Oral Daily    finasteride  5 mg Oral Daily    latanoprost  1 drop Both Eyes Nightly    pantoprazole  40 mg Oral BID AC    polyethylene glycol  17 g Oral Daily    tamsulosin  0.8 mg Oral Daily    sodium chloride flush  5-40 mL IntraVENous 2 times per day    dorzolamide  1 drop Both Eyes BID    timolol  1 drop Both Eyes BID     Continuous Infusions:   dextrose 50 mL/hr at 08/12/23 0703    sodium chloride       PRN Meds:morphine, acetaminophen, oxyCODONE-acetaminophen, potassium chloride **OR** potassium alternative oral replacement **OR** potassium chloride, magnesium sulfate, sennosides-docusate sodium, hydrALAZINE, labetalol, benzocaine-menthol, aluminum & magnesium hydroxide-simethicone, benzonatate, bisacodyl, melatonin, polyvinyl alcohol, sodium chloride flush, sodium chloride, ondansetron **OR** ondansetron, acetaminophen **OR**

## 2023-08-15 NOTE — OP NOTE
[facility]                     [address], [address]                                OPERATIVE REPORT    PATIENT NAME: Dimitrios Nix                    :        1935  MED REC NO:   [id number]                          ROOM:       [id number]  ACCOUNT NO:   [de-identified]                           ADMIT DATE: 2023  PROVIDER:     Naldo Sousa MD    DATE OF PROCEDURE:  2023    PREOPERATIVE DIAGNOSES:  Symptomatic cholelithiasis with chronic  cholecystitis. POSTOPERATIVE DIAGNOSES:  Symptomatic cholelithiasis with chronic  cholecystitis. PROCEDURE:  Laparoscopic cholecystectomy with intraoperative angiogram.    SURGEON:  Naldo Sousa MD    ANESTHESIA:  General plus local.    ESTIMATED BLOOD LOSS:  Minimal.    COMPLICATIONS:  None. SPECIMENS:  Gallbladder. FINDINGS:  The gallbladder was thick and distended with stones present. Infundibulum adhesions and chronic inflammatory change with the  gallbladder down on the arsh hepatis was present. The gallbladder was  dissected and removed laparoscopically. Cholangiogram appeared normal.    ADDITIONAL DETAILS OF SURGERY:  The patient was brought to the operating  room, placed on the operative table in supine position. Compression  boots were placed. General anesthetic was administered. The abdomen  was prepped and draped sterilely and time-out was done. An  infraumbilical 5-mm direct entry view port was placed and the abdominal  cavity was insufflated to 15 mmHg pressure. An epigastric 11-mm port  and two right lateral abdominal 5-mm ports were placed under direct  vision. The gallbladder was identified and grasped superiorly at the  fundus and retracted cephalad. The anterior wall of the gallbladder was  dissected free of adhesions.   Duodenum was carefully removed off of the  adhesions from the gallbladder and the infundibulum was grasped and  retracted inferolaterally to the right. [place] of [place] was then  dissected. The critical angle view technique was used to visualize the  cystic artery, cystic duct and node of Calot. The cyst duct gallbladder  junction was clipped and partially transected. The intraoperative  cholangiogram catheter was then passed through its Angiocath in the  intra-abdominal wall and cholangiogram was obtained. This appeared  normal.  The cholangiogram catheter was removed. The cystic duct was  then clipped with three clips proximally and transection of duct was  completed. The cystic artery was clipped with two clips proximal, one  clip distally and was transected. Bovie electrocautery was used to  remove the gallbladder from the hepatic fossa. Two additional small  vessels were clipped along the way. The gallbladder was then removed  out of the epigastric 11-mm port site. Perihepatic area was irrigated  and aspirated dry. Gallbladder bed was hemostatic. The area of the  clips was checked. All were intact with no bleeding or bile leak noted. We checked the area of the liver, stomach, transverse colon region,  lower abdomen and all bowel and the internal organs appeared, otherwise,  fine. There were small bilateral inguinal hernias present which are old  and chronic with no bowel in the hernias and these were left alone. The instruments and ports were removed. Port sites appeared hemostatic. The fascia at the epigastric site was closed with an 0 Vicryl  figure-of-eight suture. Skin at all sites were closed with 4-0 Monocryl  suture. Skin sealing glue was placed. The patient was taken to  recovery room postop in stable condition. We will send him back to the floor, resume his postop diet and look for  return to the nursing home in the next day or two, once this medical  condition is clear.         Darren Castro MD    D: 08/14/2023 14:55:58       T: 08/14/2023 14:59:44     [id number]  Job#: [id number]     Doc#:

## 2023-08-15 NOTE — CARE COORDINATION
SW informed pt was ready for discharge today [facility]. Everardo Pop in admissions at the facility and informed, RN informed and family was informed. Discharge packet completed. All documents faxed. Discharge Plan:  [address]: Sadia campbell, [address] [place] [address] [address]  Phone: [phone]  Fax: [address] [place] [address] transport at [facility].     Electronically signed by Claudette Oyster, MSW, LSW on 8/15/2023 at 6:00 PM

## 2023-08-16 ENCOUNTER — TELEPHONE (OUTPATIENT)
Dept: INTERNAL MEDICINE CLINIC | Age: 88
End: 2023-08-16

## 2023-08-16 ENCOUNTER — OFFICE VISIT (OUTPATIENT)
Dept: INTERNAL MEDICINE CLINIC | Age: 88
End: 2023-08-16

## 2023-08-16 DIAGNOSIS — R53.1 WEAKNESS: ICD-10-CM

## 2023-08-16 DIAGNOSIS — Z09 HOSPITAL DISCHARGE FOLLOW-UP: Primary | ICD-10-CM

## 2023-08-16 DIAGNOSIS — G30.1 LATE ONSET ALZHEIMER'S DISEASE WITHOUT BEHAVIORAL DISTURBANCE (HCC): ICD-10-CM

## 2023-08-16 DIAGNOSIS — N39.0 COMPLICATED UTI (URINARY TRACT INFECTION): ICD-10-CM

## 2023-08-16 DIAGNOSIS — F02.80 LATE ONSET ALZHEIMER'S DISEASE WITHOUT BEHAVIORAL DISTURBANCE (HCC): ICD-10-CM

## 2023-08-16 DIAGNOSIS — R29.6 FREQUENT FALLS: ICD-10-CM

## 2023-08-16 DIAGNOSIS — R33.9 URINARY RETENTION: ICD-10-CM

## 2023-08-16 DIAGNOSIS — E43 SEVERE MALNUTRITION (HCC): ICD-10-CM

## 2023-08-16 DIAGNOSIS — R54 FRAILTY SYNDROME IN GERIATRIC PATIENT: ICD-10-CM

## 2023-08-16 NOTE — TELEPHONE ENCOUNTER
Care Transitions Initial Follow Up Call    Outreach made within 2 business days of discharge: Yes    Patient: Cam Roberts Patient : 1935   MRN: [id number]  Reason for Admission: There are no discharge diagnoses documented for the most recent discharge. Discharge Date: 8/15/23       Spoke with: Onur Nichols    Discharge department/facility: [facility] Patient Contact:  Was patient able to fill all prescriptions: Yes  Was patient instructed to bring all medications to the follow-up visit: Yes  Is patient taking all medications as directed in the discharge summary?  Yes  Does patient understand their discharge instructions: Yes  Does patient have questions or concerns that need addressed prior to 7-14 day follow up office visit: no    Scheduled appointment with PCP within 7-14 days    Follow Up  Future Appointments   Date Time Provider [address] [place] [address]   2023  4:30 PM Tracey Ruiz MD [facility]   2023  9:30 AM Eula Varela MD  [facility]       Enoc Tovar MA

## 2023-08-17 ENCOUNTER — TELEPHONE (OUTPATIENT)
Dept: INTERNAL MEDICINE CLINIC | Age: 88
End: 2023-08-17

## 2023-08-17 NOTE — TELEPHONE ENCOUNTER
Pt child called to give us the fax number for pt paperwork.  [phone]   This is only mon-fri till 5pm

## 2023-08-18 ENCOUNTER — TELEPHONE (OUTPATIENT)
Dept: PRIMARY CARE CLINIC | Age: 88
End: 2023-08-18

## 2023-08-18 NOTE — TELEPHONE ENCOUNTER
----- Message from Sumanth Myers sent at 8/17/2023  4:01 PM EDT -----  Subject: Message to Provider    QUESTIONS  Information for Provider? Patient daughter is calling because the   paperwork that is being faxed to her needs to say Attention Jennifer Amaya on when faxed  ---------------------------------------------------------------------------  --------------  Yenni Phipps ZADA  [phone]; OK to leave message on voicemail  ---------------------------------------------------------------------------  --------------  SCRIPT ANSWERS  Relationship to Patient? Other/Third Party  Representative Name? Sam Pedroza  Is the representative on the Communication Release of Information (TRACI)   form in Epic?  Yes

## 2023-08-21 ENCOUNTER — TELEPHONE (OUTPATIENT)
Dept: INTERNAL MEDICINE CLINIC | Age: 88
End: 2023-08-21

## 2023-08-21 NOTE — TELEPHONE ENCOUNTER
Pt daughter called didn't rec the fax please fax to [phone] att delia   And fax [phone] att delia

## 2023-08-29 VITALS
RESPIRATION RATE: 12 BRPM | HEART RATE: 80 BPM | OXYGEN SATURATION: 100 % | SYSTOLIC BLOOD PRESSURE: 138 MMHG | DIASTOLIC BLOOD PRESSURE: 65 MMHG

## 2023-08-29 PROBLEM — R33.9 URINARY RETENTION: Status: ACTIVE | Noted: 2023-08-29

## 2023-08-29 PROBLEM — N39.0 UTI (URINARY TRACT INFECTION): Status: RESOLVED | Noted: 2023-08-05 | Resolved: 2023-08-29

## 2023-08-29 PROBLEM — N17.9 AKI (ACUTE KIDNEY INJURY) (HCC): Status: RESOLVED | Noted: 2023-08-05 | Resolved: 2023-08-29

## 2023-08-29 NOTE — PROGRESS NOTES
Post-Discharge Transitional Care Management Progress Note      Suzie Robledo   YOB: 1935    Date of Office Visit:  8/16/2023  Date of Hospital Admission: 8/5/23  Date of Hospital Discharge: 8/15/23, discharged to SNF at his senior living complex, hopes to return to assisted living soond  Care management risk score Rising risk (score 2-5) and Complex Care (Scores >=6): No Risk Score On File     Non face to face  following discharge, date last encounter closed (first attempt may have been earlier): 08/16/2023 08/16/2023    Call initiated 2 business days of discharge: Yes    ASSESSMENT/PLAN:   Hospital discharge follow-up  -     WI DISCHARGE MEDS RECONCILED W/ CURRENT OUTPATIENT MED LIST  Weakness  Complicated UTI (urinary tract infection)--treated and improving  Frequent falls--working on regaining strenght so he can return to assisted living  Urinary retention--catheter being managed by urology  Severe malnutrition (HCC)--states appetite improved  Late onset Alzheimer's disease without behavioral disturbance (HCC)  Frailty syndrome in geriatric patient  REviewed events leading up to hospitalization, plan for followup labs, etc. Completed court competency paperwork. Medical Decision Making: high complexity  No follow-ups on file. Subjective:   HPI:  Follow up of Hospital problems/diagnosis(es): dizziness, weakness, falls, urinary retnetion with REYES    Inpatient course: Discharge summary reviewed- see chart. Interval history/Current status: labs being followed    Note, daughter also requests completion of court competency paperwork again to maintain safety of his resources.     Patient Active Problem List   Diagnosis    BPH with obstruction/lower urinary tract symptoms    Gastroesophageal reflux disease with esophagitis    Essential hypertension, benign    Terminal esophageal web    Primary open angle glaucoma of both eyes, mild stage    S/P TURP (status post transurethral resection of

## 2023-09-02 ENCOUNTER — APPOINTMENT (OUTPATIENT)
Dept: CT IMAGING | Age: 88
End: 2023-09-02
Payer: MEDICARE

## 2023-09-02 ENCOUNTER — HOSPITAL ENCOUNTER (EMERGENCY)
Age: 88
Discharge: HOME | End: 2023-09-02
Payer: MEDICARE

## 2023-09-02 VITALS
SYSTOLIC BLOOD PRESSURE: 106 MMHG | HEART RATE: 79 BPM | OXYGEN SATURATION: 97 % | DIASTOLIC BLOOD PRESSURE: 46 MMHG | RESPIRATION RATE: 24 BRPM | TEMPERATURE: 98.5 F

## 2023-09-02 DIAGNOSIS — S01.81XA LACERATION OF FOREHEAD, INITIAL ENCOUNTER: ICD-10-CM
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
## 2023-09-02 DIAGNOSIS — S09.90XA INJURY OF HEAD, INITIAL ENCOUNTER: ICD-10-CM

## 2023-09-02 PROCEDURE — 2500000003 HC RX 250 WO HCPCS

## 2023-09-02 PROCEDURE — 12013 RPR F/E/E/N/L/M 2.6-5.0 CM: CPT

## 2023-09-02 PROCEDURE — 70486 CT MAXILLOFACIAL W/O DYE: CPT

## 2023-09-02 PROCEDURE — 90471 IMMUNIZATION ADMIN: CPT | Performed by: NURSE PRACTITIONER

## 2023-09-02 PROCEDURE — 70450 CT HEAD/BRAIN W/O DYE: CPT

## 2023-09-02 PROCEDURE — 6360000002 HC RX W HCPCS: Performed by: NURSE PRACTITIONER

## 2023-09-02 PROCEDURE — 99284 EMERGENCY DEPT VISIT MOD MDM: CPT

## 2023-09-02 PROCEDURE — 90714 TD VACC NO PRESV 7 YRS+ IM: CPT | Performed by: NURSE PRACTITIONER

## 2023-09-02 PROCEDURE — 72125 CT NECK SPINE W/O DYE: CPT

## 2023-09-02 RX ORDER — LIDOCAINE HYDROCHLORIDE AND EPINEPHRINE BITARTRATE 20; .01 MG/ML; MG/ML
INJECTION, SOLUTION SUBCUTANEOUS
Status: COMPLETED
Start: 2023-09-02 | End: 2023-09-02

## 2023-09-02 RX ADMIN — LIDOCAINE HYDROCHLORIDE,EPINEPHRINE BITARTRATE: 20; .01 INJECTION, SOLUTION INFILTRATION; PERINEURAL at 22:08

## 2023-09-02 RX ADMIN — CLOSTRIDIUM TETANI TOXOID ANTIGEN (FORMALDEHYDE INACTIVATED) AND CORYNEBACTERIUM DIPHTHERIAE TOXOID ANTIGEN (FORMALDEHYDE INACTIVATED) 0.5 ML: 5; 2 INJECTION, SUSPENSION INTRAMUSCULAR at 20:24

## 2023-09-02 ASSESSMENT — PAIN DESCRIPTION - LOCATION: LOCATION: HEAD

## 2023-09-02 ASSESSMENT — PAIN DESCRIPTION - DESCRIPTORS: DESCRIPTORS: ACHING;TENDER

## 2023-09-02 ASSESSMENT — ENCOUNTER SYMPTOMS
NAUSEA: 0
CHEST TIGHTNESS: 0
ABDOMINAL PAIN: 0
SHORTNESS OF BREATH: 0
VOMITING: 0
DIARRHEA: 0

## 2023-09-02 ASSESSMENT — LIFESTYLE VARIABLES
HOW OFTEN DO YOU HAVE A DRINK CONTAINING ALCOHOL: NEVER
HOW MANY STANDARD DRINKS CONTAINING ALCOHOL DO YOU HAVE ON A TYPICAL DAY: PATIENT DOES NOT DRINK

## 2023-09-02 ASSESSMENT — PAIN DESCRIPTION - ORIENTATION: ORIENTATION: RIGHT

## 2023-09-02 ASSESSMENT — PAIN SCALES - GENERAL
PAINLEVEL_OUTOF10: 2
PAINLEVEL_OUTOF10: 0

## 2023-09-02 ASSESSMENT — PAIN - FUNCTIONAL ASSESSMENT: PAIN_FUNCTIONAL_ASSESSMENT: 0-10

## 2023-09-02 NOTE — ED NOTES
Report given to [facility], all questions answered during handoff report, VSS at handoff.      Ron Ch RN  09/02/23 4304

## 2023-09-03 NOTE — ED PROVIDER NOTES
[facility]        Pt Name: Humberto Gibson  MRN: [id number]  [address] [facility] [place] 1935  Date of evaluation: 9/2/2023  Provider: ENEDINA Ford - CNP  PCP: Rhea Maldonado MD  Note Started: 9:43 PM EDT 9/2/23      KIERRA. I have evaluated this patient. CHIEF COMPLAINT       Chief Complaint   Patient presents with    Fall     Pt in by [facility] ems from [facility] today, on eliquis, a&o to baseline       HISTORY OF PRESENT ILLNESS: 1 or more Elements     History from : Patient    Limitations to history : None    Humberto Gibson is a 81 y.o. male who presents to the emergency department with forehead laceration. Patient reports that he was transferring from the bed to the bed side commode when he believes that the staff dropped him. He states that he was told that he fell, but he did not. He is uncertain what he struck his forehead on. No LOC. No neck or back pain. Oozing bright red blood. Uncertain of tetanus. Patient is awake and alert. Denies any fever, lightheadedness, dizziness, visual disturbances. No chest pain or pressure. No neck or back pain. No shortness of breath, cough, or congestion. No abdominal pain, nausea, vomiting, diarrhea, constipation, or dysuria. No rash. Nursing Notes were all reviewed and agreed with or any disagreements were addressed in the HPI. REVIEW OF SYSTEMS :      Review of Systems   Constitutional:  Negative for activity change, chills and fever. Respiratory:  Negative for chest tightness and shortness of breath. Cardiovascular:  Negative for chest pain. Gastrointestinal:  Negative for abdominal pain, diarrhea, nausea and vomiting. Genitourinary:  Negative for dysuria. Skin:  Positive for wound. All other systems reviewed and are negative. Positives and Pertinent negatives as per HPI.      SURGICAL HISTORY     Past Surgical History:   Procedure Laterality

## 2023-09-18 PROBLEM — I48.0 PAF (PAROXYSMAL ATRIAL FIBRILLATION) (HCC): Status: ACTIVE | Noted: 2023-09-18

## 2023-10-17 NOTE — PROGRESS NOTES
Physician Progress Note      PATIENT:               Chasity Brar  [facility] #:                  [id number]  :                       1935  ADMIT DATE:       2023 10:53 AM  DISCH DATE:        8/15/2023 8:00 PM  RESPONDING  PROVIDER #:        Veronica Hoff CNP          QUERY TEXT:    Patient admitted with nausea and vomiting. Noted documentation of UTI in H&P   on  and subsequent IM progress notes. Urine culture negative. In order to   support the diagnosis of UTI, please include additional clinical indicators   in your documentation. Or please document if the diagnosis of UTI has been   ruled out after further study. The medical record reflects the following:  Risk Factors: 81year old male with hx BPH  Clinical Indicators:  ED provider note- He does have convincing UTI with   REYES today. Suspect symptoms secondary to UTI and dehydration, will get a dose   of Rocephin.  H&P- Patient has been constipated and has urinary   frequency.  IM progress note- Admitted as inpatient for aspiration PNA,   UTI, REYES from BPH with obstruction and fecal impaction. Masterson placed in ED. Started on IVF and IV Zosyn.  IM progress note- UTI:  culture with normal    dorothy. Treatment: Labs, IV Zosyn  Options provided:  -- UTI present as evidenced by, Please document evidence. -- UTI was ruled out  -- Other - I will add my own diagnosis  -- Disagree - Not applicable / Not valid  -- Disagree - Clinically unable to determine / Unknown  -- Refer to Clinical Documentation Reviewer    PROVIDER RESPONSE TEXT:    UTI was ruled out after study. Query created by:  Theodore Moya on 10/16/2023 7:11 AM      Electronically signed by:  Elda Hoff CNP 10/16/2023 7:00   PM

## 2023-10-23 NOTE — PROGRESS NOTES
Physician Progress Note      PATIENT:               Ralph Vargas  CSN #:                  [id number]  :                       1935  ADMIT DATE:       2023 10:53 AM  DISCH DATE:        8/15/2023 8:00 PM  RESPONDING  PROVIDER #:        Ana Hoff CNP          QUERY TEXT:    Patient admitted with nausea and vomiting. Noted documentation of acute   systolic congestive heart failure in  IM progress note and chronic   systolic and diastolic heart failure in  Cardiology progress note. Noted   increase in Pro-BNP and diastolic dysfunction on  echo. If possible, please document in progress notes and discharge summary if you   are evaluating and /or treating any of the following: The medical record reflects the following:  Risk Factors: 81year old male  Clinical Indicators:  Pro-BNP- 1,487.  Pro-BNP 5,527. 08/10 Pro-BNP   6,532.  echo- Mild-to-moderately reduced global systolic function with an   ejection fraction estimated at 40%. .. Grade I diastolic dysfunction with   normal LV filling pressures.  CXR- CHF with perihilar edema, cardiomegaly   and vascular congestion. More focal right basilar opacification concerning   for infiltrate. Right pleural effusion.   Cardiology progress note-   Chronic systolic and Diastolic Heart Failure- neg 1050 fluid balance 24H.    IM progress note- Acute sCHF-new diagnosis a  Treatment: Labs, CXR, echo, Cardiology consult, IVP and PO Lasix,   spironolactone  Options provided:  -- Acute on chronic combined systolic and diastolic congestive heart failure   confirmed and chronic combined congestive heart failure ruled out  -- Chronic combined systolic and diastolic congestive heart failure confirmed   and acute on chronic combined congestive heart failure ruled out  -- Other - I will add my own diagnosis  -- Disagree - Not applicable / Not valid  -- Disagree - Clinically unable to determine / Unknown  -- Refer

## 2023-11-09 ENCOUNTER — TELEPHONE (OUTPATIENT)
Dept: INTERNAL MEDICINE CLINIC | Age: 88
End: 2023-11-09

## 2023-11-09 NOTE — TELEPHONE ENCOUNTER
Pt was moved back to his apt. Pt stated the rehab gave other meds to pt and will be sending the list to us by fax for dr Violet Crowe to go over to see if she wants pt to take those meds. One med was lexapro and pt is having mood swings with this  Pt daughter doesn't want pt to take this anymore.

## 2023-11-10 NOTE — TELEPHONE ENCOUNTER
Left  2nd message on vm for Pt daughter to call us to make an appt for the Pt and we need to know the date Pt was released from the rehab and name of rehab

## 2023-11-15 ENCOUNTER — TELEPHONE (OUTPATIENT)
Dept: INTERNAL MEDICINE CLINIC | Age: 88
End: 2023-11-15

## 2023-11-15 NOTE — TELEPHONE ENCOUNTER
Patient's daughter Padmini Olson is calling to request an order for Lexapro that has been prescribed from skilled nursing care at [facility]. He is being discharged soon and has a follow up appointment with you on 12/05. She states he is not tolerating this medication and that you are familiar that he is unable to take anti depressants.

## 2023-11-16 NOTE — TELEPHONE ENCOUNTER
Spoke to CARLOTA Mcdonald at [address] [place] [address] today discontinued Lexapro medication per Dr. Lucie Johnson.

## 2023-11-29 ENCOUNTER — TELEPHONE (OUTPATIENT)
Dept: INTERNAL MEDICINE CLINIC | Age: 88
End: 2023-11-29

## 2023-11-29 NOTE — TELEPHONE ENCOUNTER
Mr Harrison Alexis came back on nov 7th.  Will send us a med list to review and for the Dr to sign off on

## 2023-12-18 PROBLEM — N39.0 COMPLICATED UTI (URINARY TRACT INFECTION): Status: RESOLVED | Noted: 2023-08-05 | Resolved: 2023-12-18

## 2023-12-27 ENCOUNTER — TELEPHONE (OUTPATIENT)
Dept: INTERNAL MEDICINE CLINIC | Age: 88
End: 2023-12-27

## 2023-12-27 NOTE — TELEPHONE ENCOUNTER
----- Message from Nell Loja sent at 12/27/2023 11:19 AM EST -----  Subject: Message to Provider    QUESTIONS  Information for Provider? Patient's daughter would like for someone to fax   her a copy of her father's updated medication list that Dr. Vee Code just   updated. Fax it to [phone]  ---------------------------------------------------------------------------  --------------  Rene Swain [place]  [phone]; OK to leave message on voicemail  ---------------------------------------------------------------------------  --------------  SCRIPT ANSWERS  Relationship to Patient? Other/Third Party  Representative Name? Arti Ferrari  Is the representative on the Communication Release of Information (TRACI)   form in Epic?  Yes

## 2023-12-27 NOTE — TELEPHONE ENCOUNTER
----- Message from Padmini Carreon sent at 12/27/2023 11:20 AM EST -----  Subject: Message to Provider    QUESTIONS  Information for Provider? Please send the fax Attention Holland Sepulveda  ---------------------------------------------------------------------------  --------------  [address] [place]  [phone]; OK to leave message on voicemail  ---------------------------------------------------------------------------  --------------  SCRIPT ANSWERS  Relationship to Patient? Other/Third Party  Representative Name? Boni Mabry  Is the representative on the Communication Release of Information (TRACI)   form in Epic?  Yes

## 2024-01-02 ENCOUNTER — TELEPHONE (OUTPATIENT)
Dept: INTERNAL MEDICINE CLINIC | Age: 89
End: 2024-01-02

## 2024-01-02 NOTE — TELEPHONE ENCOUNTER
Pt daughter called to see if Dr Lorenzo wrote the script for furosemide or someone else. Dr Lorenzo did not

## 2024-01-18 ENCOUNTER — TELEPHONE (OUTPATIENT)
Dept: INTERNAL MEDICINE CLINIC | Age: 89
End: 2024-01-18

## 2024-01-18 NOTE — TELEPHONE ENCOUNTER
Pt daughter call to let us know Abigail is sending a request for a refill off  adult diapers this is abigail on [facility]   They are to be sent to the nursing /shelter home he lives iin.

## 2024-01-29 RX ORDER — AMMONIUM LACTATE 12 G/100G
CREAM TOPICAL
Qty: 140 G | Refills: 1 | Status: SHIPPED | OUTPATIENT
Start: 2024-01-29 | End: 2024-02-28

## 2024-02-09 ENCOUNTER — TELEPHONE (OUTPATIENT)
Dept: INTERNAL MEDICINE CLINIC | Age: 89
End: 2024-02-09

## 2024-02-09 DIAGNOSIS — N40.1 BPH WITH OBSTRUCTION/LOWER URINARY TRACT SYMPTOMS: ICD-10-CM

## 2024-02-09 DIAGNOSIS — N13.8 BPH WITH OBSTRUCTION/LOWER URINARY TRACT SYMPTOMS: ICD-10-CM

## 2024-02-09 RX ORDER — FINASTERIDE 5 MG/1
TABLET, FILM COATED ORAL
Qty: 90 TABLET | Refills: 2 | Status: SHIPPED | OUTPATIENT
Start: 2024-02-09

## 2024-02-09 NOTE — TELEPHONE ENCOUNTER
Requested Prescriptions     Pending Prescriptions Disp Refills    finasteride (PROSCAR) 5 MG tablet [Pharmacy Med Name: FINASTERIDE 5 MG TABLET] 90 tablet 2     Sig: TAKE 1 TABLET BY MOUTH EVERY DAY   Patient requesting a medication refill.  Pharmacy: [facility]  Next office visit: 3/1/2024  Last regular office visit: 12/18/2023

## 2024-02-09 NOTE — TELEPHONE ENCOUNTER
----- Message from Hillary Hyatt sent at 2/8/2024  3:45 PM EST -----  Subject: Appointment Request    Reason for Call: Established Patient Appointment needed: Routine Medicare   AWV    QUESTIONS    Reason for appointment request? No appointments available during search     Additional Information for Provider? Shree Saha needs his History and   Physical form filled out for the [facility]. Would like the last   appointment of day. Please call back to schedule his appointment.  ---------------------------------------------------------------------------  --------------  CALL BACK INFO  [phone]; OK to leave message on voicemail  ---------------------------------------------------------------------------  --------------  SCRIPT ANSWERS

## 2024-03-01 ENCOUNTER — OFFICE VISIT (OUTPATIENT)
Dept: INTERNAL MEDICINE CLINIC | Age: 89
End: 2024-03-01

## 2024-03-01 DIAGNOSIS — R54 FRAILTY SYNDROME IN GERIATRIC PATIENT: Primary | ICD-10-CM

## 2024-03-01 DIAGNOSIS — R53.1 GENERAL WEAKNESS: ICD-10-CM

## 2024-03-01 DIAGNOSIS — G30.1 LATE ONSET ALZHEIMER'S DISEASE WITHOUT BEHAVIORAL DISTURBANCE (HCC): ICD-10-CM

## 2024-03-01 DIAGNOSIS — K59.09 CHRONIC CONSTIPATION: ICD-10-CM

## 2024-03-01 DIAGNOSIS — N13.8 BPH WITH OBSTRUCTION/LOWER URINARY TRACT SYMPTOMS: ICD-10-CM

## 2024-03-01 DIAGNOSIS — E43 SEVERE MALNUTRITION (HCC): ICD-10-CM

## 2024-03-01 DIAGNOSIS — R26.9 GAIT DIFFICULTY: ICD-10-CM

## 2024-03-01 DIAGNOSIS — N40.1 BPH WITH OBSTRUCTION/LOWER URINARY TRACT SYMPTOMS: ICD-10-CM

## 2024-03-01 DIAGNOSIS — I48.0 PAROXYSMAL ATRIAL FIBRILLATION (HCC): ICD-10-CM

## 2024-03-01 DIAGNOSIS — F02.80 LATE ONSET ALZHEIMER'S DISEASE WITHOUT BEHAVIORAL DISTURBANCE (HCC): ICD-10-CM

## 2024-03-01 DIAGNOSIS — I10 ESSENTIAL HYPERTENSION, BENIGN: ICD-10-CM

## 2024-03-01 DIAGNOSIS — I50.42 CHRONIC COMBINED SYSTOLIC AND DIASTOLIC CONGESTIVE HEART FAILURE (HCC): ICD-10-CM

## 2024-03-01 RX ORDER — POLYETHYLENE GLYCOL 3350 17 G/17G
17 POWDER, FOR SOLUTION ORAL DAILY
Qty: 1530 G | Refills: 3 | Status: SHIPPED | OUTPATIENT
Start: 2024-03-01

## 2024-03-01 SDOH — ECONOMIC STABILITY: INCOME INSECURITY: HOW HARD IS IT FOR YOU TO PAY FOR THE VERY BASICS LIKE FOOD, HOUSING, MEDICAL CARE, AND HEATING?: NOT HARD AT ALL

## 2024-03-01 SDOH — ECONOMIC STABILITY: FOOD INSECURITY: WITHIN THE PAST 12 MONTHS, YOU WORRIED THAT YOUR FOOD WOULD RUN OUT BEFORE YOU GOT MONEY TO BUY MORE.: NEVER TRUE

## 2024-03-01 SDOH — ECONOMIC STABILITY: FOOD INSECURITY: WITHIN THE PAST 12 MONTHS, THE FOOD YOU BOUGHT JUST DIDN'T LAST AND YOU DIDN'T HAVE MONEY TO GET MORE.: NEVER TRUE

## 2024-03-01 ASSESSMENT — PATIENT HEALTH QUESTIONNAIRE - PHQ9
1. LITTLE INTEREST OR PLEASURE IN DOING THINGS: 0
SUM OF ALL RESPONSES TO PHQ9 QUESTIONS 1 & 2: 0
SUM OF ALL RESPONSES TO PHQ QUESTIONS 1-9: 0
2. FEELING DOWN, DEPRESSED OR HOPELESS: 0
SUM OF ALL RESPONSES TO PHQ QUESTIONS 1-9: 0

## 2024-03-01 NOTE — PROGRESS NOTES
combined systolic and diastolic congestive heart failure (HCC)    4. Late onset Alzheimer's disease without behavioral disturbance (HCC)    5. Paroxysmal atrial fibrillation (HCC)    6. Essential hypertension, benign    7. General weakness    8. Gait difficulty    9. Chronic constipation    10. BPH with obstruction/lower urinary tract symptoms            Problem List       BPH with obstruction/lower urinary tract symptoms    Relevant Medications    tamsulosin (FLOMAX) 0.4 MG capsule    finasteride (PROSCAR) 5 MG tablet    Essential hypertension, benign    Relevant Medications    apixaban (ELIQUIS) 5 MG TABS tablet    furosemide (LASIX) 20 MG tablet    spironolactone (ALDACTONE) 25 MG tablet    metoprolol succinate (TOPROL XL) 50 MG extended release tablet    Late onset Alzheimer's disease without behavioral disturbance (HCC)    Relevant Medications    escitalopram (LEXAPRO) 20 MG tablet    Frailty syndrome in geriatric patient - Primary    Cardiac arrhythmia    Relevant Medications    apixaban (ELIQUIS) 5 MG TABS tablet    furosemide (LASIX) 20 MG tablet    spironolactone (ALDACTONE) 25 MG tablet    metoprolol succinate (TOPROL XL) 50 MG extended release tablet    Gait difficulty    Severe malnutrition (HCC)    General weakness    Chronic combined systolic and diastolic congestive heart failure (HCC)    Relevant Medications    apixaban (ELIQUIS) 5 MG TABS tablet    furosemide (LASIX) 20 MG tablet    spironolactone (ALDACTONE) 25 MG tablet    metoprolol succinate (TOPROL XL) 50 MG extended release tablet     * No order type specified *    I have reconciled the medications in chart with what patient reports to be taking, andreviewed action/ sideeffects and how to take any new medications.  Patient/caregiver understands purpose and side effects.  A complete  list of medications was provided in their after-visit summary.    Return in about 2 months (around 5/1/2024) for f/u mult med extended, AWV.    Time basedbilling:

## 2024-03-10 VITALS
WEIGHT: 145.2 LBS | HEIGHT: 66 IN | OXYGEN SATURATION: 97 % | SYSTOLIC BLOOD PRESSURE: 138 MMHG | HEART RATE: 97 BPM | BODY MASS INDEX: 23.33 KG/M2 | RESPIRATION RATE: 12 BRPM | DIASTOLIC BLOOD PRESSURE: 69 MMHG

## 2024-04-01 ENCOUNTER — TELEPHONE (OUTPATIENT)
Dept: INTERNAL MEDICINE CLINIC | Age: 89
End: 2024-04-01

## 2024-04-19 ENCOUNTER — TELEPHONE (OUTPATIENT)
Dept: INTERNAL MEDICINE CLINIC | Age: 89
End: 2024-04-19

## 2024-04-19 NOTE — TELEPHONE ENCOUNTER
Myah with [facility] Need to know if it will be ok for th pt to do 9 am and 6 pm for the eye drops?    Please advise

## 2024-04-19 NOTE — TELEPHONE ENCOUNTER
Tried to call phone # but it stated Araceli on the VM.  I left a message stating I was trying to call Myah at [facility].  Gave her our office if it is the correct #.

## 2024-04-19 NOTE — TELEPHONE ENCOUNTER
Evon Lorenzo MD  [facility] Clinical Staff3 minutes ago (11:22 AM)     Those are ophthalmologist ordered meds but I am ok with the variation from a strict q 12 hour schedule.

## 2024-04-19 NOTE — TELEPHONE ENCOUNTER
I left a message to call back for Dr. Lorenzo's reply.. The correct number for the nurse is [phone].

## 2024-06-11 ENCOUNTER — TELEPHONE (OUTPATIENT)
Dept: INTERNAL MEDICINE CLINIC | Age: 89
End: 2024-06-11

## 2024-06-11 NOTE — TELEPHONE ENCOUNTER
----- Message from Greg Claire sent at 6/11/2024  4:00 PM EDT -----  Regarding: ECC Message to Provider  ECC Message to Provider    Relationship to Patient: Guardian Romy Ryan     Additional Information : Romy would like to know if Dr. Lorenzo can remove the surgical staples of the patient so that they will not schedule another appointment with another doctor for that. They would like to have it remove on the same day of his upcoming appointment this month if his provider can remove the surgical staples.  --------------------------------------------------------------------------------------------------------------------------    Call Back Information: OK to leave message on voicemail  Preferred Call Back Number: Phone [phone]

## 2024-06-13 ENCOUNTER — TELEPHONE (OUTPATIENT)
Dept: INTERNAL MEDICINE CLINIC | Age: 89
End: 2024-06-13

## 2024-06-13 NOTE — TELEPHONE ENCOUNTER
L/m on VM stating stating that I did speak with Dr. Lorenzo and she does not feel comfortable taking the staples out.  She believes the surgeon would rather do that, too.

## 2024-06-21 ENCOUNTER — TELEPHONE (OUTPATIENT)
Dept: INTERNAL MEDICINE CLINIC | Age: 89
End: 2024-06-21

## 2024-06-21 NOTE — TELEPHONE ENCOUNTER
Pt daughter called doesn't like that the nursing home is having their dr look at the pt. She only trusts Dr Lorenzo pt will be out of skilled nursing in 4 or so weeks. Pt can be seen after he is out of skilled nursing and not seeing their dr.

## 2024-06-21 NOTE — TELEPHONE ENCOUNTER
Called the nursing home to make sure pt was coming to the appt on Monday. Pt has been in skilled nursing at the home since 05/29/2024. Pt has been being seen by Dr Castro 1 to 2 week since then. [facility] needs the med list for this pt faxed to [phone]. Pt has fractured his hip in a fall and had surgery.

## 2024-09-25 ENCOUNTER — OFFICE VISIT (OUTPATIENT)
Dept: INTERNAL MEDICINE CLINIC | Age: 89
End: 2024-09-25

## 2024-09-25 VITALS — DIASTOLIC BLOOD PRESSURE: 63 MMHG | HEART RATE: 91 BPM | SYSTOLIC BLOOD PRESSURE: 128 MMHG | OXYGEN SATURATION: 98 %

## 2024-09-25 DIAGNOSIS — N13.8 BPH WITH OBSTRUCTION/LOWER URINARY TRACT SYMPTOMS: ICD-10-CM

## 2024-09-25 DIAGNOSIS — R54 FRAILTY SYNDROME IN GERIATRIC PATIENT: Primary | ICD-10-CM

## 2024-09-25 DIAGNOSIS — E43 SEVERE MALNUTRITION (HCC): ICD-10-CM

## 2024-09-25 DIAGNOSIS — I48.0 PAF (PAROXYSMAL ATRIAL FIBRILLATION) (HCC): ICD-10-CM

## 2024-09-25 DIAGNOSIS — I50.42 CHRONIC COMBINED SYSTOLIC AND DIASTOLIC HEART FAILURE (HCC): ICD-10-CM

## 2024-09-25 DIAGNOSIS — F02.80 LATE ONSET ALZHEIMER'S DISEASE WITHOUT BEHAVIORAL DISTURBANCE (HCC): ICD-10-CM

## 2024-09-25 DIAGNOSIS — R53.1 GENERAL WEAKNESS: ICD-10-CM

## 2024-09-25 DIAGNOSIS — G30.1 LATE ONSET ALZHEIMER'S DISEASE WITHOUT BEHAVIORAL DISTURBANCE (HCC): ICD-10-CM

## 2024-09-25 DIAGNOSIS — I10 ESSENTIAL HYPERTENSION, BENIGN: ICD-10-CM

## 2024-09-25 DIAGNOSIS — D68.69 SECONDARY HYPERCOAGULABLE STATE (HCC): ICD-10-CM

## 2024-09-25 DIAGNOSIS — N40.1 BPH WITH OBSTRUCTION/LOWER URINARY TRACT SYMPTOMS: ICD-10-CM

## 2024-09-25 DIAGNOSIS — R26.9 GAIT DIFFICULTY: ICD-10-CM

## 2024-09-25 PROBLEM — E87.70 HYPERVOLEMIA: Status: RESOLVED | Noted: 2023-08-10 | Resolved: 2024-09-25

## 2024-09-25 PROBLEM — I50.9 CHF (CONGESTIVE HEART FAILURE) (HCC): Status: ACTIVE | Noted: 2024-05-26

## 2024-09-25 PROBLEM — I50.9 CHF (CONGESTIVE HEART FAILURE) (HCC): Status: RESOLVED | Noted: 2024-05-26 | Resolved: 2024-09-25

## 2024-09-25 RX ORDER — DORZOLAMIDE HCL 20 MG/ML
1 SOLUTION/ DROPS OPHTHALMIC 2 TIMES DAILY
COMMUNITY

## 2024-09-25 RX ORDER — CALCIUM CARBONATE/VITAMIN D3 600 MG-10
1 TABLET ORAL DAILY
COMMUNITY

## 2024-09-25 SDOH — ECONOMIC STABILITY: FOOD INSECURITY: WITHIN THE PAST 12 MONTHS, THE FOOD YOU BOUGHT JUST DIDN'T LAST AND YOU DIDN'T HAVE MONEY TO GET MORE.: NEVER TRUE

## 2024-09-25 SDOH — ECONOMIC STABILITY: INCOME INSECURITY: HOW HARD IS IT FOR YOU TO PAY FOR THE VERY BASICS LIKE FOOD, HOUSING, MEDICAL CARE, AND HEATING?: NOT HARD AT ALL

## 2024-09-25 SDOH — ECONOMIC STABILITY: FOOD INSECURITY: WITHIN THE PAST 12 MONTHS, YOU WORRIED THAT YOUR FOOD WOULD RUN OUT BEFORE YOU GOT MONEY TO BUY MORE.: NEVER TRUE

## 2024-09-25 NOTE — PROGRESS NOTES
Chief Complaint   Patient presents with    Follow-Up from Hospital       HPI: Here for followup and management of multiple chronic conditions as per the active problems list on chart, which I reviewed and updated with the patient today. States doing well with no new concerns except if noted below.    Also requesting wheelchair as he has become weaker and more unsteady on his feet    I have reviewed the chart notes available from myself and other providers. I have reviewed and addressed all active problems and created or updated the problems list in detail, as needed.    No problem-specific Assessment & Plan notes found for this encounter.      I have extensively reviewed and reconciled the medication list, discontinued medications not taking or no longer appropriate, and updated the active meds list        No results found for: \"LABA1C\"    Lab Results   Component Value Date     08/14/2023     (H) 08/13/2023     (H) 08/12/2023    K 3.4 (L) 08/14/2023    K 3.7 08/13/2023    K 4.0 08/12/2023     08/14/2023     (H) 08/13/2023     (H) 08/12/2023    CO2 26 08/14/2023    CO2 26 08/13/2023    CO2 26 08/12/2023    BUN 19 08/14/2023    BUN 22 (H) 08/13/2023    BUN 29 (H) 08/12/2023    CREATININE 0.8 08/14/2023    CREATININE 0.8 08/13/2023    CREATININE 1.1 08/12/2023    GLUCOSE 99 08/14/2023    GLUCOSE 122 (H) 08/13/2023    GLUCOSE 106 (H) 08/12/2023    CALCIUM 8.8 08/14/2023    CALCIUM 9.4 08/13/2023    CALCIUM 9.4 08/12/2023       No results found for: \"CHOL\", \"TRIG\", \"HDL\", \"LDLDIRECT\"    Lab Results   Component Value Date    ALT 45 (H) 08/13/2023    ALT 18 08/09/2023    ALT 17 08/05/2023    AST 32 08/13/2023    AST 18 08/09/2023    AST 22 08/05/2023       No results found for: \"TSH\", \"T4FREE\", \"T3FREE\"    Lab Results   Component Value Date    WBC 6.8 08/14/2023    WBC 7.2 08/13/2023    WBC 7.6 08/10/2023    HGB 12.3 (L) 08/14/2023    HGB 12.8 (L) 08/13/2023    HGB 11.5 (L)

## 2024-11-12 ENCOUNTER — TELEPHONE (OUTPATIENT)
Dept: INTERNAL MEDICINE CLINIC | Age: 89
End: 2024-11-12

## 2024-12-09 ENCOUNTER — OFFICE VISIT (OUTPATIENT)
Dept: INTERNAL MEDICINE CLINIC | Age: 89
End: 2024-12-09

## 2024-12-09 VITALS
SYSTOLIC BLOOD PRESSURE: 149 MMHG | OXYGEN SATURATION: 99 % | HEART RATE: 98 BPM | DIASTOLIC BLOOD PRESSURE: 79 MMHG | RESPIRATION RATE: 12 BRPM

## 2024-12-09 DIAGNOSIS — I48.0 PAF (PAROXYSMAL ATRIAL FIBRILLATION) (HCC): ICD-10-CM

## 2024-12-09 DIAGNOSIS — I50.42 CHRONIC COMBINED SYSTOLIC AND DIASTOLIC HEART FAILURE (HCC): ICD-10-CM

## 2024-12-09 DIAGNOSIS — G30.1 LATE ONSET ALZHEIMER'S DISEASE WITHOUT BEHAVIORAL DISTURBANCE (HCC): ICD-10-CM

## 2024-12-09 DIAGNOSIS — F02.80 LATE ONSET ALZHEIMER'S DISEASE WITHOUT BEHAVIORAL DISTURBANCE (HCC): ICD-10-CM

## 2024-12-09 DIAGNOSIS — E43 SEVERE MALNUTRITION (HCC): ICD-10-CM

## 2024-12-09 DIAGNOSIS — I10 ESSENTIAL HYPERTENSION, BENIGN: Primary | ICD-10-CM

## 2024-12-09 DIAGNOSIS — D68.69 SECONDARY HYPERCOAGULABLE STATE (HCC): ICD-10-CM

## 2024-12-09 DIAGNOSIS — R62.7 FAILURE TO THRIVE IN ADULT: ICD-10-CM

## 2024-12-09 DIAGNOSIS — R54 FRAILTY SYNDROME IN GERIATRIC PATIENT: ICD-10-CM

## 2024-12-09 RX ORDER — SPIRONOLACTONE 25 MG/1
25 TABLET ORAL DAILY
COMMUNITY
Start: 2024-12-06

## 2024-12-09 RX ORDER — OXYBUTYNIN CHLORIDE 10 MG/1
10 TABLET, EXTENDED RELEASE ORAL DAILY
COMMUNITY
Start: 2024-10-24

## 2024-12-09 RX ORDER — TRAMADOL HYDROCHLORIDE 50 MG/1
50 TABLET ORAL NIGHTLY
COMMUNITY
Start: 2024-10-25

## 2024-12-09 NOTE — PROGRESS NOTES
Capillary Refill: Capillary refill takes less than 2 seconds.      Coloration: Skin is not pale.      Findings: No erythema or rash.   Neurological:      General: No focal deficit present.      Mental Status: He is alert and oriented to person, place, and time.      Cranial Nerves: No cranial nerve deficit.      Motor: No abnormal muscle tone.      Coordination: Coordination normal.      Deep Tendon Reflexes: Reflexes are normal and symmetric. Reflexes normal.   Psychiatric:         Mood and Affect: Mood normal.         Behavior: Behavior normal.         ASSESSMENT AND PLANS:      Except as noted below, all chronic problems have been reviewed and are stable to continue medications or other therapy as previously documented in the patient's chart, with changes per orders or documentation below:        Assessment and Plan: Patient received counseling and, if relevant,printed instructions for all symptoms listed in CC and HPI, as well as for all diagnoses brought onto today's visit note below. Typical counseling includes, but is not limited to, non pharmacologic measures to manage listed symptoms and conditions; appropriate use, risks and benefits for all prescribed medications; potential interactions between medications both prescribed and OTC; diet; exercise; healthy behaviors; and goalsetting to improve health. Pt.or responsible party was involved in shared decision making and had opportunity to have all questions answered.      CURRENT MEDS W/ ASSOC DIAG           Start Date End Date     acetaminophen (TYLENOL) 500 MG tablet  --  --     Associated Diagnoses:  --     apixaban (ELIQUIS) 5 MG TABS tablet  08/15/23  --     Take 1 tablet by mouth 2 times daily     Associated Diagnoses:  --     bisacodyl (DULCOLAX) 5 MG EC tablet  --  --     Associated Diagnoses:  --     calcium carb-cholecalciferol 600-10 MG-MCG TABS per tab  --  --     Associated Diagnoses:  --     Calcium Carbonate Antacid (CALCIUM CARBONATE PO)  --

## 2024-12-13 RX ORDER — METOPROLOL TARTRATE 50 MG
50 TABLET ORAL NIGHTLY
COMMUNITY

## (undated) DEVICE — SUTURE MCRYL + SZ 4-0 L27IN ABSRB UD L19MM PS-2 3/8 CIR MCP426H

## (undated) DEVICE — TROCAR SLEEVE: Brand: KII ® LOW PROFILE SLEEVE

## (undated) DEVICE — CORD ES L10FT MPLR LAP

## (undated) DEVICE — TROCAR: Brand: KII FIOS FIRST ENTRY

## (undated) DEVICE — ENDOSCOPIC KIT 6X3/16 FT COLON W/ 1.1 OZ 2 GWN W/O BRSH

## (undated) DEVICE — GLOVE SURG SZ 6.5 L11.2IN FNGR THK9.8MIL STRW LTX POLYMER

## (undated) DEVICE — C-ARM: Brand: UNBRANDED

## (undated) DEVICE — MASC TURNOVER KIT: Brand: MEDLINE INDUSTRIES, INC.

## (undated) DEVICE — LAPAROSCOPIC TROCAR SLEEVE/SINGLE USE: Brand: KII® LOW PROFILE OPTICAL ACCESS SYSTEM

## (undated) DEVICE — GOWN SIRUS NONREIN LG W/TWL: Brand: MEDLINE INDUSTRIES, INC.

## (undated) DEVICE — VALVE SUCTION AIR H2O SET ORCA POD + DISP

## (undated) DEVICE — INDICATED FOR USE DURING OPEN AND LAPAROSCOPIC CHOLECYSTECTOMY PROCEDURES TO INJECT RADIOPAQUE MEDIA THROUGH THE CYSTIC DUCT INTO THE BILIARY TREE.: Brand: AEROSTAT®

## (undated) DEVICE — SYRINGE, LUER LOCK, 30ML: Brand: MEDLINE

## (undated) DEVICE — APPLICATOR MEDICATED 26 CC SOLUTION HI LT ORNG CHLORAPREP

## (undated) DEVICE — APPLIER CLP M/L SHFT DIA5MM 15 LIG LIGAMAX 5

## (undated) DEVICE — SUTURE VCRL + SZ 0 L27IN ABSRB VLT L26MM UR-6 5/8 CIR VCP603H

## (undated) DEVICE — DRAPE,LAP,CHOLE,W/TROUGHS,STERILE: Brand: MEDLINE

## (undated) DEVICE — SOLUTION IRRIG 500ML 0.9% SOD CHLO USP POUR PLAS BTL

## (undated) DEVICE — FORCEPS BX L240CM WRK CHN 2.8MM STD CAP W/ NDL MIC MESH

## (undated) DEVICE — LIQUIBAND RAPID ADHESIVE 36/CS 0.8ML: Brand: MEDLINE

## (undated) DEVICE — AIR/WATER CLEANING ADAPTER FOR OLYMPUS® GI ENDOSCOPE: Brand: BULLDOG®

## (undated) DEVICE — Device

## (undated) DEVICE — BW-412T DISP COMBO CLEANING BRUSH: Brand: SINGLE USE COMBINATION CLEANING BRUSH

## (undated) DEVICE — LAPAROSCOPY PACK: Brand: MEDLINE INDUSTRIES, INC.

## (undated) DEVICE — MOUTHPIECE ENDOSCP L CTRL OPN AND SIDE PORTS DISP

## (undated) DEVICE — SOLUTION IV IRRIG WATER 500ML POUR BRL ST 2F7113

## (undated) DEVICE — HYPODERMIC SAFETY NEEDLE: Brand: MAGELLAN

## (undated) DEVICE — ELECTRODE PT RET AD L9FT HI MOIST COND ADH HYDRGEL CORDED

## (undated) DEVICE — SHEET,DRAPE,53X77,STERILE: Brand: MEDLINE